# Patient Record
Sex: FEMALE | Race: WHITE | Employment: OTHER | URBAN - METROPOLITAN AREA
[De-identification: names, ages, dates, MRNs, and addresses within clinical notes are randomized per-mention and may not be internally consistent; named-entity substitution may affect disease eponyms.]

---

## 2019-02-21 ENCOUNTER — HOSPITAL ENCOUNTER (EMERGENCY)
Age: 84
Discharge: HOME | End: 2019-02-21
Attending: EMERGENCY MEDICINE
Payer: MEDICARE

## 2019-02-21 VITALS
RESPIRATION RATE: 18 BRPM | HEART RATE: 98 BPM | HEIGHT: 60 IN | TEMPERATURE: 98.3 F | DIASTOLIC BLOOD PRESSURE: 74 MMHG | SYSTOLIC BLOOD PRESSURE: 121 MMHG | OXYGEN SATURATION: 98 % | BODY MASS INDEX: 23.98 KG/M2 | WEIGHT: 122.13 LBS

## 2019-02-21 DIAGNOSIS — E87.6 HYPOKALEMIA: Primary | ICD-10-CM

## 2019-02-21 LAB
ALBUMIN SERPL-MCNC: 3.6 G/DL (ref 3.2–4.6)
ALBUMIN/GLOB SERPL: 0.9 {RATIO}
ALP SERPL-CCNC: 104 U/L (ref 50–136)
ALT SERPL-CCNC: 20 U/L (ref 12–65)
ANION GAP SERPL CALC-SCNC: 8 MMOL/L
AST SERPL-CCNC: 45 U/L (ref 15–37)
BASOPHILS # BLD: 0 K/UL (ref 0–0.2)
BASOPHILS NFR BLD: 1 % (ref 0–2)
BILIRUB SERPL-MCNC: 0.4 MG/DL (ref 0.2–1.1)
BUN SERPL-MCNC: 25 MG/DL (ref 8–23)
CALCIUM SERPL-MCNC: 9.6 MG/DL (ref 8.3–10.4)
CHLORIDE SERPL-SCNC: 105 MMOL/L (ref 98–107)
CO2 SERPL-SCNC: 24 MMOL/L (ref 21–32)
CREAT SERPL-MCNC: 1.9 MG/DL (ref 0.6–1)
DIFFERENTIAL METHOD BLD: ABNORMAL
EOSINOPHIL # BLD: 0.1 K/UL (ref 0–0.8)
EOSINOPHIL NFR BLD: 1 % (ref 0.5–7.8)
ERYTHROCYTE [DISTWIDTH] IN BLOOD BY AUTOMATED COUNT: 12.7 % (ref 11.9–14.6)
GLOBULIN SER CALC-MCNC: 4 G/DL (ref 2.3–3.5)
GLUCOSE SERPL-MCNC: 96 MG/DL (ref 65–100)
HCT VFR BLD AUTO: 38.4 % (ref 35.8–46.3)
HGB BLD-MCNC: 12.4 G/DL (ref 11.7–15.4)
IMM GRANULOCYTES # BLD AUTO: 0 K/UL (ref 0–0.5)
IMM GRANULOCYTES NFR BLD AUTO: 1 % (ref 0–5)
LYMPHOCYTES # BLD: 1.1 K/UL (ref 0.5–4.6)
LYMPHOCYTES NFR BLD: 15 % (ref 13–44)
MCH RBC QN AUTO: 31.2 PG (ref 26.1–32.9)
MCHC RBC AUTO-ENTMCNC: 32.3 G/DL (ref 31.4–35)
MCV RBC AUTO: 96.5 FL (ref 79.6–97.8)
MONOCYTES # BLD: 0.5 K/UL (ref 0.1–1.3)
MONOCYTES NFR BLD: 7 % (ref 4–12)
NEUTS SEG # BLD: 5.7 K/UL (ref 1.7–8.2)
NEUTS SEG NFR BLD: 76 % (ref 43–78)
NRBC # BLD: 0 K/UL (ref 0–0.2)
PLATELET # BLD AUTO: 194 K/UL (ref 150–450)
PMV BLD AUTO: 10.5 FL (ref 9.4–12.3)
POTASSIUM SERPL-SCNC: 2.8 MMOL/L (ref 3.5–5.1)
PROT SERPL-MCNC: 7.6 G/DL
RBC # BLD AUTO: 3.98 M/UL (ref 4.05–5.2)
SODIUM SERPL-SCNC: 137 MMOL/L (ref 136–145)
WBC # BLD AUTO: 7.5 K/UL (ref 4.3–11.1)

## 2019-02-21 PROCEDURE — 74011250637 HC RX REV CODE- 250/637: Performed by: EMERGENCY MEDICINE

## 2019-02-21 PROCEDURE — 99282 EMERGENCY DEPT VISIT SF MDM: CPT | Performed by: EMERGENCY MEDICINE

## 2019-02-21 PROCEDURE — 80053 COMPREHEN METABOLIC PANEL: CPT

## 2019-02-21 PROCEDURE — 85025 COMPLETE CBC W/AUTO DIFF WBC: CPT

## 2019-02-21 RX ORDER — MIRTAZAPINE 15 MG/1
15 TABLET, FILM COATED ORAL
COMMUNITY
Start: 2019-02-21 | End: 2019-03-23

## 2019-02-21 RX ORDER — AMOXICILLIN 250 MG
CAPSULE ORAL
Status: ON HOLD | COMMUNITY
End: 2019-09-17

## 2019-02-21 RX ORDER — BENAZEPRIL HYDROCHLORIDE 10 MG/1
10 TABLET ORAL
COMMUNITY
Start: 2019-02-21 | End: 2019-03-23

## 2019-02-21 RX ORDER — VENLAFAXINE HYDROCHLORIDE 150 MG/1
150 CAPSULE, EXTENDED RELEASE ORAL
COMMUNITY
Start: 2019-02-21 | End: 2019-03-23

## 2019-02-21 RX ORDER — ERGOCALCIFEROL 1.25 MG/1
50000 CAPSULE ORAL
Status: ON HOLD | COMMUNITY
Start: 2019-02-21 | End: 2019-09-17

## 2019-02-21 RX ORDER — FOLIC ACID 1 MG/1
1 TABLET ORAL
COMMUNITY
End: 2019-09-23

## 2019-02-21 RX ORDER — POTASSIUM CHLORIDE 20 MEQ/1
40 TABLET, EXTENDED RELEASE ORAL
Status: COMPLETED | OUTPATIENT
Start: 2019-02-21 | End: 2019-02-21

## 2019-02-21 RX ORDER — POTASSIUM CHLORIDE 20 MEQ/1
20 TABLET, EXTENDED RELEASE ORAL 2 TIMES DAILY
Qty: 10 TAB | Refills: 0 | Status: ON HOLD | OUTPATIENT
Start: 2019-02-21 | End: 2019-09-17

## 2019-02-21 RX ORDER — AMLODIPINE BESYLATE 5 MG/1
5 TABLET ORAL
COMMUNITY
Start: 2019-02-21 | End: 2019-03-23

## 2019-02-21 RX ORDER — ZOLPIDEM TARTRATE 5 MG/1
5 TABLET ORAL
COMMUNITY
Start: 2019-02-21 | End: 2019-03-23

## 2019-02-21 RX ORDER — POTASSIUM CHLORIDE 750 MG/1
10 CAPSULE, EXTENDED RELEASE ORAL
COMMUNITY
Start: 2019-02-21 | End: 2019-02-21 | Stop reason: DRUGHIGH

## 2019-02-21 RX ORDER — CELECOXIB 100 MG/1
100 CAPSULE ORAL
COMMUNITY
Start: 2019-02-21 | End: 2019-03-23

## 2019-02-21 RX ADMIN — POTASSIUM CHLORIDE 40 MEQ: 20 TABLET, EXTENDED RELEASE ORAL at 22:38

## 2019-02-22 NOTE — ED PROVIDER NOTES
[de-identified]year-old brought to the emergency room due to abnormal lab results. Family reports that the patient was called today and told that her potassium was 2.8. She was urged to come to the ER immediately for evaluation Patient is not on any loop diuretics. She's had no vomiting or diarrhea Patient has been essentially asymptomatic and feeling at her baseline Patient does have a history of hypokalemia, however, she has not been on a potassium supplementation in some time The history is provided by the patient and a relative. Abnormal Lab Results This is a new problem. The current episode started 6 to 12 hours ago. The problem occurs constantly. The problem has not changed since onset. Pertinent negatives include no chest pain, no abdominal pain, no headaches and no shortness of breath. Nothing aggravates the symptoms. Nothing relieves the symptoms. She has tried nothing for the symptoms. Past Medical History:  
Diagnosis Date  ADHD  Arthritis  Hypertension History reviewed. No pertinent surgical history. History reviewed. No pertinent family history. Social History Socioeconomic History  Marital status:  Spouse name: Not on file  Number of children: Not on file  Years of education: Not on file  Highest education level: Not on file Social Needs  Financial resource strain: Not on file  Food insecurity - worry: Not on file  Food insecurity - inability: Not on file  Transportation needs - medical: Not on file  Transportation needs - non-medical: Not on file Occupational History  Not on file Tobacco Use  Smoking status: Never Smoker  Smokeless tobacco: Never Used Substance and Sexual Activity  Alcohol use: No  
  Frequency: Never  Drug use: No  
 Sexual activity: Not Currently Other Topics Concern  Not on file Social History Narrative  Not on file ALLERGIES: Patient has no known allergies. Review of Systems Unable to perform ROS: Dementia Constitutional: Negative for chills and fever. HENT: Negative for congestion, ear pain and rhinorrhea. Eyes: Negative for photophobia and discharge. Respiratory: Negative for cough and shortness of breath. Cardiovascular: Negative for chest pain and palpitations. Gastrointestinal: Negative for abdominal pain, constipation, diarrhea and vomiting. Endocrine: Negative for cold intolerance and heat intolerance. Genitourinary: Negative for dysuria and flank pain. Musculoskeletal: Negative for arthralgias, myalgias and neck pain. Skin: Negative for rash and wound. Allergic/Immunologic: Negative for environmental allergies and food allergies. Neurological: Negative for syncope and headaches. Hematological: Negative for adenopathy. Does not bruise/bleed easily. Psychiatric/Behavioral: Negative for dysphoric mood. The patient is not nervous/anxious. All other systems reviewed and are negative. Vitals:  
 02/21/19 2029 BP: 120/72 Pulse: (!) 112 Resp: 16 Temp: 98.3 °F (36.8 °C) SpO2: 99% Weight: 55.4 kg (122 lb 2 oz) Height: 5' (1.524 m) Physical Exam  
Constitutional: She is oriented to person, place, and time. She appears well-developed and well-nourished. She appears distressed. HENT:  
Head: Normocephalic and atraumatic. Mouth/Throat: No oropharyngeal exudate. Eyes: EOM are normal. Pupils are equal, round, and reactive to light. Neck: Normal range of motion. Neck supple. No JVD present. Cardiovascular: Normal rate, regular rhythm, normal heart sounds and intact distal pulses. Exam reveals no gallop and no friction rub. No murmur heard. Pulmonary/Chest: Effort normal and breath sounds normal.  
Abdominal: Soft. Normal appearance and bowel sounds are normal. She exhibits no distension and no mass. There is no hepatosplenomegaly. There is no tenderness. Musculoskeletal: Normal range of motion. She exhibits no edema or deformity. Neurological: She is alert and oriented to person, place, and time. She has normal strength. No cranial nerve deficit or sensory deficit. She displays a negative Romberg sign. Gait normal. GCS eye subscore is 4. GCS verbal subscore is 5. GCS motor subscore is 6. Skin: Skin is warm and dry. Capillary refill takes less than 2 seconds. No rash noted. Psychiatric: She has a normal mood and affect. Her speech is normal and behavior is normal. Judgment and thought content normal. She exhibits abnormal recent memory. Nursing note and vitals reviewed. MDM Number of Diagnoses or Management Options Hypokalemia: new and requires workup Diagnosis management comments: labwork tonight confirms a potassium of 2.8 Discussed treatment options with the patient and family They are agreeable to oral supplementation. Family strongly encouraged to have patient followed up in 4-5 days for repeat lab work to confirm potassium status Amount and/or Complexity of Data Reviewed Clinical lab tests: ordered and reviewed Tests in the medicine section of CPT®: ordered and reviewed Review and summarize past medical records: yes Risk of Complications, Morbidity, and/or Mortality Presenting problems: moderate Diagnostic procedures: moderate Management options: moderate General comments: Elements of this note have been dictated via voice recognition software. Text and phrases may be limited by the accuracy of the software. The chart has been reviewed, but errors may still be present. Patient Progress Patient progress: stable Procedures

## 2019-02-22 NOTE — ED NOTES
I have reviewed discharge instructions with the patient. The patient verbalized understanding. Patient left ED via Discharge Method: ambulatory to Home with family. Opportunity for questions and clarification provided. Patient given 1 scripts. To continue your aftercare when you leave the hospital, you may receive an automated call from our care team to check in on how you are doing. This is a free service and part of our promise to provide the best care and service to meet your aftercare needs.  If you have questions, or wish to unsubscribe from this service please call [phone]. Thank you for Choosing our [facility] Department.

## 2019-02-22 NOTE — ED TRIAGE NOTES
Pt states that she had routine labs drawn today and was called by the NP. Stated that her Potassium was low

## 2019-02-22 NOTE — DISCHARGE INSTRUCTIONS
Take medication as directed  Recheck with your doctor in 2-3 days  Return to ER for any worsening symptoms or new problems which may arise

## 2019-02-26 ENCOUNTER — HOSPITAL ENCOUNTER (EMERGENCY)
Age: 84
Discharge: HOME | End: 2019-02-26
Attending: EMERGENCY MEDICINE
Payer: MEDICARE

## 2019-02-26 ENCOUNTER — APPOINTMENT (OUTPATIENT)
Dept: CT IMAGING | Age: 84
End: 2019-02-26
Attending: EMERGENCY MEDICINE
Payer: MEDICARE

## 2019-02-26 ENCOUNTER — APPOINTMENT (OUTPATIENT)
Dept: GENERAL RADIOLOGY | Age: 84
End: 2019-02-26
Attending: EMERGENCY MEDICINE
Payer: MEDICARE

## 2019-02-26 VITALS
HEART RATE: 84 BPM | RESPIRATION RATE: 17 BRPM | DIASTOLIC BLOOD PRESSURE: 78 MMHG | OXYGEN SATURATION: 98 % | TEMPERATURE: 98.3 F | SYSTOLIC BLOOD PRESSURE: 178 MMHG | WEIGHT: 122 LBS | BODY MASS INDEX: 23.95 KG/M2 | HEIGHT: 60 IN

## 2019-02-26 DIAGNOSIS — M25.512 PAIN IN JOINT OF LEFT SHOULDER: ICD-10-CM

## 2019-02-26 DIAGNOSIS — S41.111A SKIN TEAR OF RIGHT UPPER ARM WITHOUT COMPLICATION, INITIAL ENCOUNTER: ICD-10-CM

## 2019-02-26 DIAGNOSIS — S01.01XA LACERATION OF SCALP, INITIAL ENCOUNTER: Primary | ICD-10-CM

## 2019-02-26 LAB
BACTERIA URNS QL MICRO: 0 /HPF
CASTS URNS QL MICRO: NORMAL /LPF
EPI CELLS #/AREA URNS HPF: NORMAL /HPF
RBC #/AREA URNS HPF: NORMAL /HPF
WBC URNS QL MICRO: NORMAL /HPF

## 2019-02-26 PROCEDURE — 77030008462 HC STPLR SKN PROX J&J -A

## 2019-02-26 PROCEDURE — 81015 MICROSCOPIC EXAM OF URINE: CPT

## 2019-02-26 PROCEDURE — 73030 X-RAY EXAM OF SHOULDER: CPT

## 2019-02-26 PROCEDURE — 81003 URINALYSIS AUTO W/O SCOPE: CPT | Performed by: EMERGENCY MEDICINE

## 2019-02-26 PROCEDURE — 99284 EMERGENCY DEPT VISIT MOD MDM: CPT | Performed by: EMERGENCY MEDICINE

## 2019-02-26 PROCEDURE — 70450 CT HEAD/BRAIN W/O DYE: CPT

## 2019-02-26 PROCEDURE — 75810000293 HC SIMP/SUPERF WND  RPR: Performed by: EMERGENCY MEDICINE

## 2019-02-26 NOTE — ED NOTES
I have reviewed discharge instructions with the patient and caregiver. The patient and caregiver verbalized understanding. Patient left ED via Discharge Method: wheelchair to Home with family. Opportunity for questions and clarification provided. Patient given 0 scripts. To continue your aftercare when you leave the hospital, you may receive an automated call from our care team to check in on how you are doing. This is a free service and part of our promise to provide the best care and service to meet your aftercare needs.  If you have questions, or wish to unsubscribe from this service please call [phone]. Thank you for Choosing our [facility] Emergency Department.

## 2019-02-26 NOTE — ED PROVIDER NOTES
HPI: 
80 female here status post mechanical fall. Stated she got out of bed to go to the bathroom this morning. Somehow lost her balance. Head and the right side of her head on the bedpost.  No loss of consciousness. Bleeding and no right-sided scalp. Also has skin tear on her right forearm pain in her left shoulder and arm pit. Recently treated for UTI 3 weeks ago. Denies any pain with urination right now however still has some incontinent and that has progressively worsened over the past few years Denies any hip pain, weakness tingling or numbness in one side of the body Denies chest pain or shortness of breath before or after the fall ROS Constitutional: No fever, no chills Skin: no rash Eye: No vision changes ENMT: No sore throat Respiratory: No shortness of breath, no cough Cardiovascular: No chest pain, no palpitations Gastrointestinal: No vomiting, no nausea, no diarrhea, no abdominal pain : No dysuria MSK: No back pain, no muscle pain, + joint pain Neuro: No headache, no change in mental status, no numbness, no tingling, no weakness Psych:  
Endocrine:  
All other review of systems positive per history of present illness and the above otherwise negative or noncontributory. Visit Vitals /84 Pulse 86 Temp 98.3 °F (36.8 °C) Resp 16 Ht 5' (1.524 m) Wt 55.3 kg (122 lb) SpO2 96% BMI 23.83 kg/m² Past Medical History:  
Diagnosis Date  ADHD  Arthritis  Hypertension History reviewed. No pertinent surgical history. Prior to Admission Medications Prescriptions Last Dose Informant Patient Reported? Taking? amLODIPine (NORVASC) 5 mg tablet   Yes No  
Sig: Take 5 mg by mouth.  
benazepril (LOTENSIN) 10 mg tablet   Yes No  
Sig: Take 10 mg by mouth. celecoxib (CELEBREX) 100 mg capsule   Yes No  
Sig: Take 100 mg by mouth.  
ergocalciferol (ERGOCALCIFEROL) 50,000 unit capsule   Yes No  
Sig: Take 50,000 Units by mouth. folic acid (FOLVITE) 1 mg tablet   Yes No  
Sig: Take 1 mg by mouth.  
mirtazapine (REMERON) 15 mg tablet   Yes No  
Sig: Take 15 mg by mouth.  
potassium chloride (K-DUR, KLOR-CON) 20 mEq tablet   No No  
Sig: Take 1 Tab by mouth two (2) times a day. senna-docusate (PERICOLACE) 8.6-50 mg per tablet   Yes No  
Sig: Take  by mouth. venlafaxine-SR (EFFEXOR-XR) 150 mg capsule   Yes No  
Sig: Take 150 mg by mouth.  
zolpidem (AMBIEN) 5 mg tablet   Yes No  
Sig: Take 5 mg by mouth. Facility-Administered Medications: None Adult Exam  
General: alert, no acute distress Head: large hematoma in the right parietal. 1 cm laceration without FB. No boggy scalp. ENT: moist mucous membranes Neck: supple, non-tender; full range of motion Cardiovascular: regular rate and rhythm, normal peripheral perfusion, no edema Respiratory:  normal respirations; no wheezing, rales or rhonchi Gastrointestinal: soft, non-tender; no rebound or guarding, no peritoneal signs, no distension Back: non-tender, full range of motion Musculoskeletal: No pain bilateral clavicle, chest wall. Left shoulder without any obvious deformity, some mild tenderness on the anterior and inferior aspect radiating into the left ribs. No paradoxical chest wall movement. Range of motion is fairly intact. No pain at the right upper extremity, left elbow, left hand and wrist.  Pelvic is stable with full range of motion of bilateral lower extremity 
 normal strength, no gross deformities RT forearm - 1 cm skin tear that will not required suture repair. No FB noted. Neurological: alert and oriented x 4, no gross focal deficits; normal speech Psychiatric: cooperative; appropriate mood and affect MDM: no LOC or status post fall with large hematoma in the right scalp. We'll obtain CT scan of the head, x-ray of the left shoulder for further assessment. We'll clean wound. We'll check urine. UA negative for any acute infection. No ICH on CT. 2 staples placed. Need removal in 7 days. F/u with pcp or return. Precautions given. Xr Shoulder Lt Ap/lat Min 2 V Result Date: 2/26/2019 Left shoulder 3 view dated 2/26/2019 CLINICAL INFORMATION: Moderate pain after fall this morning Views of the shoulder show no acute fracture or dislocation. There is degenerative change at the glenohumeral joint and acromioclavicular joint. IMPRESSION: No acute fracture Ct Head Wo Cont Result Date: 2/26/2019 CT Brain dated 2/26/2019  Comparison: None Clinical Information:  Hit head on bedpost  5 mm axial images were obtained from skull base to vertex without contrast. Radiation dose reduction techniques were used for this study. Our scanners use one or all of the following:  Automated exposure control, adjustment of the mA and/or kV according to patient size, iterative reconstruction. Findings: Ventricles, sulci and cisterns are within normal limits in size. No midline shift. Ill-defined hypodensity in the cerebral white matter bilaterally is consistent chronic ischemic white matter change. No hemorrhage, mass, mass effect or acute territorial infarction. No extra-axial abnormality. No skull fracture. Some is present right parietal region. Mastoid air cells and visualized paranasal sinuses are aerated and unremarkable. Impression: No acute intracranial abnormality Procedure Note - Laceration repair Performed by: Hank Kolb MD 
Immediately prior to the procedure, the patient was reevaluated and found suitable for the planned procedure and any planned medications. Immediately prior to the procedure a timeout was called to verify the correct patient, procedure, equipment, and markings as appropriate. Complexity: right scalp A 1 cm laceration to the right scalp was irrigated copiously, prepped with chloroprep and draped as appropriate.  The area was anesthetized with 2 mLs of Lidocaine 1%. The wound was explored and no foreign bodies were found. The wound was repaired with 2 staples. The wound was closed with good hemostasis and approximation. A dressing was applied. The procedure took 10 minutes. The patient tolerated the procedure well. Dragon voice recognition software was used to create this note. Although the note has been reviewed and corrected where necessary, additional errors may have been overlooked and remain in the text.

## 2019-02-26 NOTE — ED NOTES
Skin tear to right forearm cleaned. Neosporin and non stick gauze applied covered with rolled gauze. Pt tolerated well. Laceration to scalp cleaned using wound  and 4x4 gauze.

## 2019-02-26 NOTE — ED TRIAGE NOTES
Pt brought to ED via EMS from home. Per EMS pt fell and hit head on bed post. Pt wit laceration to forehead and right axilla pain. Pt with skin tear to right wrist/forearm. Pt also reports right knee pain since yesterday. Pt denies LOC.  
 
Reena Raphael RN

## 2019-05-10 ENCOUNTER — HOSPITAL ENCOUNTER (OUTPATIENT)
Dept: LAB | Age: 84
Discharge: HOME | End: 2019-05-10

## 2019-05-10 LAB
ANION GAP SERPL CALC-SCNC: 9 MMOL/L (ref 7–16)
APPEARANCE UR: ABNORMAL
BACTERIA URNS QL MICRO: ABNORMAL /HPF
BASOPHILS # BLD: 0.1 K/UL (ref 0–0.2)
BASOPHILS NFR BLD: 1 % (ref 0–2)
BILIRUB UR QL: NEGATIVE
BUN SERPL-MCNC: 31 MG/DL (ref 8–23)
CALCIUM SERPL-MCNC: 9.4 MG/DL (ref 8.3–10.4)
CASTS URNS QL MICRO: 0 /LPF
CHLORIDE SERPL-SCNC: 103 MMOL/L (ref 98–107)
CO2 SERPL-SCNC: 28 MMOL/L (ref 21–32)
COLOR UR: YELLOW
CREAT SERPL-MCNC: 1.36 MG/DL (ref 0.6–1)
CRYSTALS URNS QL MICRO: 0 /LPF
DIFFERENTIAL METHOD BLD: ABNORMAL
EOSINOPHIL # BLD: 0.2 K/UL (ref 0–0.8)
EOSINOPHIL NFR BLD: 3 % (ref 0.5–7.8)
EPI CELLS #/AREA URNS HPF: ABNORMAL /HPF
ERYTHROCYTE [DISTWIDTH] IN BLOOD BY AUTOMATED COUNT: 12.5 % (ref 11.9–14.6)
GLUCOSE SERPL-MCNC: 73 MG/DL (ref 65–100)
GLUCOSE UR STRIP.AUTO-MCNC: NEGATIVE MG/DL
HCT VFR BLD AUTO: 36.2 % (ref 35.8–46.3)
HGB BLD-MCNC: 10.9 G/DL (ref 11.7–15.4)
HGB UR QL STRIP: ABNORMAL
IMM GRANULOCYTES # BLD AUTO: 0.1 K/UL (ref 0–0.5)
IMM GRANULOCYTES NFR BLD AUTO: 1 % (ref 0–5)
KETONES UR QL STRIP.AUTO: NEGATIVE MG/DL
LEUKOCYTE ESTERASE UR QL STRIP.AUTO: ABNORMAL
LYMPHOCYTES # BLD: 2 K/UL (ref 0.5–4.6)
LYMPHOCYTES NFR BLD: 23 % (ref 13–44)
MCH RBC QN AUTO: 32.1 PG (ref 26.1–32.9)
MCHC RBC AUTO-ENTMCNC: 30.1 G/DL (ref 31.4–35)
MCV RBC AUTO: 106.5 FL (ref 79.6–97.8)
MONOCYTES # BLD: 0.5 K/UL (ref 0.1–1.3)
MONOCYTES NFR BLD: 6 % (ref 4–12)
MUCOUS THREADS URNS QL MICRO: 0 /LPF
NEUTS SEG # BLD: 5.7 K/UL (ref 1.7–8.2)
NEUTS SEG NFR BLD: 67 % (ref 43–78)
NITRITE UR QL STRIP.AUTO: NEGATIVE
NRBC # BLD: 0 K/UL (ref 0–0.2)
PH UR STRIP: 7 [PH] (ref 5–9)
PLATELET # BLD AUTO: 187 K/UL (ref 150–450)
PMV BLD AUTO: 11.4 FL (ref 9.4–12.3)
POTASSIUM SERPL-SCNC: 3.8 MMOL/L (ref 3.5–5.1)
PROT UR STRIP-MCNC: NEGATIVE MG/DL
RBC # BLD AUTO: 3.4 M/UL (ref 4.05–5.2)
RBC #/AREA URNS HPF: ABNORMAL /HPF
SODIUM SERPL-SCNC: 140 MMOL/L (ref 136–145)
SP GR UR REFRACTOMETRY: 1.01 (ref 1–1.02)
UROBILINOGEN UR QL STRIP.AUTO: 0.2 EU/DL (ref 0.2–1)
WBC # BLD AUTO: 8.6 K/UL (ref 4.3–11.1)
WBC URNS QL MICRO: >100 /HPF

## 2019-05-10 PROCEDURE — 81001 URINALYSIS AUTO W/SCOPE: CPT

## 2019-05-10 PROCEDURE — 85025 COMPLETE CBC W/AUTO DIFF WBC: CPT

## 2019-05-10 PROCEDURE — 87088 URINE BACTERIA CULTURE: CPT

## 2019-05-10 PROCEDURE — 87186 SC STD MICRODIL/AGAR DIL: CPT

## 2019-05-10 PROCEDURE — 87086 URINE CULTURE/COLONY COUNT: CPT

## 2019-05-10 PROCEDURE — 81015 MICROSCOPIC EXAM OF URINE: CPT

## 2019-05-10 PROCEDURE — 80048 BASIC METABOLIC PNL TOTAL CA: CPT

## 2019-05-13 LAB
BACTERIA SPEC CULT: ABNORMAL
SERVICE CMNT-IMP: ABNORMAL

## 2019-06-28 ENCOUNTER — HOSPITAL ENCOUNTER (EMERGENCY)
Age: 84
Discharge: HOME | End: 2019-06-28
Attending: EMERGENCY MEDICINE
Payer: MEDICARE

## 2019-06-28 ENCOUNTER — APPOINTMENT (OUTPATIENT)
Dept: CT IMAGING | Age: 84
End: 2019-06-28
Attending: NURSE PRACTITIONER
Payer: MEDICARE

## 2019-06-28 VITALS
TEMPERATURE: 98.1 F | SYSTOLIC BLOOD PRESSURE: 176 MMHG | HEART RATE: 57 BPM | HEIGHT: 60 IN | RESPIRATION RATE: 16 BRPM | DIASTOLIC BLOOD PRESSURE: 77 MMHG | BODY MASS INDEX: 23.95 KG/M2 | OXYGEN SATURATION: 100 % | WEIGHT: 122 LBS
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
## 2019-06-28 DIAGNOSIS — S09.90XA CLOSED HEAD INJURY, INITIAL ENCOUNTER: ICD-10-CM

## 2019-06-28 DIAGNOSIS — S01.81XA FACIAL LACERATION, INITIAL ENCOUNTER: ICD-10-CM

## 2019-06-28 LAB
ANION GAP SERPL CALC-SCNC: 6 MMOL/L (ref 7–16)
APPEARANCE UR: CLEAR
ATRIAL RATE: 50 BPM
BACTERIA URNS QL MICRO: 0 /HPF
BASOPHILS # BLD: 0 K/UL (ref 0–0.2)
BASOPHILS NFR BLD: 0 % (ref 0–2)
BILIRUB UR QL: NEGATIVE
BUN SERPL-MCNC: 19 MG/DL (ref 8–23)
CALCIUM SERPL-MCNC: 9.5 MG/DL (ref 8.3–10.4)
CALCULATED P AXIS, ECG09: 61 DEGREES
CALCULATED R AXIS, ECG10: 4 DEGREES
CALCULATED T AXIS, ECG11: 72 DEGREES
CASTS URNS QL MICRO: 0 /LPF
CHLORIDE SERPL-SCNC: 99 MMOL/L (ref 98–107)
CO2 SERPL-SCNC: 29 MMOL/L (ref 21–32)
COLOR UR: YELLOW
CREAT SERPL-MCNC: 0.93 MG/DL (ref 0.6–1)
DIAGNOSIS, 93000: NORMAL
DIFFERENTIAL METHOD BLD: ABNORMAL
EOSINOPHIL # BLD: 0.1 K/UL (ref 0–0.8)
EOSINOPHIL NFR BLD: 1 % (ref 0.5–7.8)
EPI CELLS #/AREA URNS HPF: ABNORMAL /HPF
ERYTHROCYTE [DISTWIDTH] IN BLOOD BY AUTOMATED COUNT: 12.5 % (ref 11.9–14.6)
GLUCOSE SERPL-MCNC: 92 MG/DL (ref 65–100)
GLUCOSE UR STRIP.AUTO-MCNC: NEGATIVE MG/DL
HCT VFR BLD AUTO: 34.8 % (ref 35.8–46.3)
HGB BLD-MCNC: 11.2 G/DL (ref 11.7–15.4)
HGB UR QL STRIP: ABNORMAL
IMM GRANULOCYTES # BLD AUTO: 0.1 K/UL (ref 0–0.5)
IMM GRANULOCYTES NFR BLD AUTO: 1 % (ref 0–5)
KETONES UR QL STRIP.AUTO: NEGATIVE MG/DL
LEUKOCYTE ESTERASE UR QL STRIP.AUTO: ABNORMAL
LYMPHOCYTES # BLD: 2 K/UL (ref 0.5–4.6)
LYMPHOCYTES NFR BLD: 25 % (ref 13–44)
MCH RBC QN AUTO: 30.6 PG (ref 26.1–32.9)
MCHC RBC AUTO-ENTMCNC: 32.2 G/DL (ref 31.4–35)
MCV RBC AUTO: 95.1 FL (ref 79.6–97.8)
MONOCYTES # BLD: 0.6 K/UL (ref 0.1–1.3)
MONOCYTES NFR BLD: 8 % (ref 4–12)
NEUTS SEG # BLD: 5.2 K/UL (ref 1.7–8.2)
NEUTS SEG NFR BLD: 66 % (ref 43–78)
NITRITE UR QL STRIP.AUTO: NEGATIVE
NRBC # BLD: 0 K/UL (ref 0–0.2)
P-R INTERVAL, ECG05: 140 MS
PH UR STRIP: 7 [PH] (ref 5–9)
PLATELET # BLD AUTO: 178 K/UL (ref 150–450)
PMV BLD AUTO: 10 FL (ref 9.4–12.3)
POTASSIUM SERPL-SCNC: 4 MMOL/L (ref 3.5–5.1)
PROT UR STRIP-MCNC: 30 MG/DL
Q-T INTERVAL, ECG07: 492 MS
QRS DURATION, ECG06: 82 MS
QTC CALCULATION (BEZET), ECG08: 448 MS
RBC # BLD AUTO: 3.66 M/UL (ref 4.05–5.2)
RBC #/AREA URNS HPF: ABNORMAL /HPF
SODIUM SERPL-SCNC: 134 MMOL/L (ref 136–145)
SP GR UR REFRACTOMETRY: 1.01 (ref 1–1.02)
UROBILINOGEN UR QL STRIP.AUTO: 0.2 EU/DL (ref 0.2–1)
VENTRICULAR RATE, ECG03: 50 BPM
WBC # BLD AUTO: 7.9 K/UL (ref 4.3–11.1)
WBC URNS QL MICRO: ABNORMAL /HPF

## 2019-06-28 PROCEDURE — 96376 TX/PRO/DX INJ SAME DRUG ADON: CPT | Performed by: NURSE PRACTITIONER

## 2019-06-28 PROCEDURE — 80048 BASIC METABOLIC PNL TOTAL CA: CPT

## 2019-06-28 PROCEDURE — 81001 URINALYSIS AUTO W/SCOPE: CPT

## 2019-06-28 PROCEDURE — 72125 CT NECK SPINE W/O DYE: CPT

## 2019-06-28 PROCEDURE — 96374 THER/PROPH/DIAG INJ IV PUSH: CPT | Performed by: NURSE PRACTITIONER

## 2019-06-28 PROCEDURE — 74011250636 HC RX REV CODE- 250/636: Performed by: NURSE PRACTITIONER

## 2019-06-28 PROCEDURE — 70450 CT HEAD/BRAIN W/O DYE: CPT

## 2019-06-28 PROCEDURE — 93005 ELECTROCARDIOGRAM TRACING: CPT | Performed by: NURSE PRACTITIONER

## 2019-06-28 PROCEDURE — 85025 COMPLETE CBC W/AUTO DIFF WBC: CPT

## 2019-06-28 PROCEDURE — 99284 EMERGENCY DEPT VISIT MOD MDM: CPT | Performed by: NURSE PRACTITIONER

## 2019-06-28 RX ORDER — HYDRALAZINE HYDROCHLORIDE 20 MG/ML
10 INJECTION INTRAMUSCULAR; INTRAVENOUS
Status: COMPLETED | OUTPATIENT
Start: 2019-06-28 | End: 2019-06-28

## 2019-06-28 RX ORDER — METOPROLOL SUCCINATE 25 MG/1
25 TABLET, EXTENDED RELEASE ORAL DAILY
Status: ON HOLD | COMMUNITY
End: 2019-09-17

## 2019-06-28 RX ORDER — CARVEDILOL 3.12 MG/1
3.12 TABLET ORAL 2 TIMES DAILY WITH MEALS
COMMUNITY
End: 2019-09-23

## 2019-06-28 RX ORDER — HYDRALAZINE HYDROCHLORIDE 20 MG/ML
20 INJECTION INTRAMUSCULAR; INTRAVENOUS
Status: COMPLETED | OUTPATIENT
Start: 2019-06-28 | End: 2019-06-28

## 2019-06-28 RX ADMIN — HYDRALAZINE HYDROCHLORIDE 20 MG: 20 INJECTION INTRAMUSCULAR; INTRAVENOUS at 18:23

## 2019-06-28 RX ADMIN — HYDRALAZINE HYDROCHLORIDE 10 MG: 20 INJECTION INTRAMUSCULAR; INTRAVENOUS at 17:06

## 2019-06-28 NOTE — DISCHARGE INSTRUCTIONS
Over the counter tylenol for pain. Follow up with your primary care provider to have your blood pressure rechecked. Return to the emergency department as needed.

## 2019-06-28 NOTE — ED PROVIDER NOTES
Patient states she fell last night in her bathroom. She states she hit her head but is unsure of what she hit. She denies LOC. She denies nausea and vomiting. She has a laceration above her left eye. No bleeding at this time. Patient noted to have elevated blood pressure reading in triage. Patient's family states patient was recently started on new blood pressure medication but her bp has continued to be elevated. The history is provided by the patient. Past Medical History:   Diagnosis Date    ADHD     Arthritis     Hypertension        History reviewed. No pertinent surgical history. History reviewed. No pertinent family history.     Social History     Socioeconomic History    Marital status:      Spouse name: Not on file    Number of children: Not on file    Years of education: Not on file    Highest education level: Not on file   Occupational History    Not on file   Social Needs    Financial resource strain: Not on file    Food insecurity:     Worry: Not on file     Inability: Not on file    Transportation needs:     Medical: Not on file     Non-medical: Not on file   Tobacco Use    Smoking status: Never Smoker    Smokeless tobacco: Never Used   Substance and Sexual Activity    Alcohol use: No     Frequency: Never    Drug use: No    Sexual activity: Not Currently   Lifestyle    Physical activity:     Days per week: Not on file     Minutes per session: Not on file    Stress: Not on file   Relationships    Social connections:     Talks on phone: Not on file     Gets together: Not on file     Attends [employer] service: Not on file     Active member of club or organization: Not on file     Attends meetings of clubs or organizations: Not on file     Relationship status: Not on file    Intimate partner violence:     Fear of current or ex partner: Not on file     Emotionally abused: Not on file     Physically abused: Not on file     Forced sexual activity: Not on file   Other Topics Concern    Not on file   Social History Narrative    Not on file         ALLERGIES: Patient has no known allergies. Review of Systems   Constitutional: Negative for chills and fever. Respiratory: Negative for cough. Cardiovascular: Negative for chest pain. Gastrointestinal: Negative for abdominal pain, nausea and vomiting. Skin: Positive for wound. Neurological: Negative for dizziness. Vitals:    06/28/19 1447 06/28/19 1521 06/28/19 1706 06/28/19 1823   BP: (!) 196/91 (!) 204/88 (!) 216/91 (!) 212/91   Pulse:   (!) 58 66   Resp:       Temp:       SpO2:  96%     Weight:       Height:                Physical Exam   Constitutional: She is oriented to person, place, and time. She appears well-developed and well-nourished. No distress. HENT:   Head: Normocephalic. Head is with laceration. Eyes: Conjunctivae and EOM are normal. Right pupil is round and reactive. Left pupil is round and reactive. Pupils are equal.   Neck: Normal range of motion. Neck supple. Spinous process tenderness and muscular tenderness present. Cardiovascular: Normal rate and regular rhythm. Pulmonary/Chest: Effort normal and breath sounds normal.   Neurological: She is alert and oriented to person, place, and time. No cranial nerve deficit or sensory deficit. GCS eye subscore is 4. GCS verbal subscore is 5. GCS motor subscore is 6. Skin: Skin is warm and dry. She is not diaphoretic. Psychiatric: She has a normal mood and affect. Her behavior is normal.   Nursing note and vitals reviewed.      Recent Results (from the past 12 hour(s))   EKG, 12 LEAD, INITIAL    Collection Time: 06/28/19  3:22 PM   Result Value Ref Range    Ventricular Rate 50 BPM    Atrial Rate 50 BPM    P-R Interval 140 ms    QRS Duration 82 ms    Q-T Interval 492 ms    QTC Calculation (Bezet) 448 ms    Calculated P Axis 61 degrees    Calculated R Axis 4 degrees    Calculated T Axis 72 degrees    Diagnosis       Sinus bradycardia with Premature atrial complexes  Abnormal ECG  No previous ECGs available  Confirmed by Chris Rodriguez MD (), NIK WELLINGTON ([id number]) on 6/28/2019 3:56:10 PM     CBC WITH AUTOMATED DIFF    Collection Time: 06/28/19  3:23 PM   Result Value Ref Range    WBC 7.9 4.3 - 11.1 K/uL    RBC 3.66 (L) 4.05 - 5.2 M/uL    HGB 11.2 (L) 11.7 - 15.4 g/dL    HCT 34.8 (L) 35.8 - 46.3 %    MCV 95.1 79.6 - 97.8 FL    MCH 30.6 26.1 - 32.9 PG    MCHC 32.2 31.4 - 35.0 g/dL    RDW 12.5 11.9 - 14.6 %    PLATELET 245 538 - 404 K/uL    MPV 10.0 9.4 - 12.3 FL    ABSOLUTE NRBC 0.00 0.0 - 0.2 K/uL    DF AUTOMATED      NEUTROPHILS 66 43 - 78 %    LYMPHOCYTES 25 13 - 44 %    MONOCYTES 8 4.0 - 12.0 %    EOSINOPHILS 1 0.5 - 7.8 %    BASOPHILS 0 0.0 - 2.0 %    IMMATURE GRANULOCYTES 1 0.0 - 5.0 %    ABS. NEUTROPHILS 5.2 1.7 - 8.2 K/UL    ABS. LYMPHOCYTES 2.0 0.5 - 4.6 K/UL    ABS. MONOCYTES 0.6 0.1 - 1.3 K/UL    ABS. EOSINOPHILS 0.1 0.0 - 0.8 K/UL    ABS. BASOPHILS 0.0 0.0 - 0.2 K/UL    ABS. IMM.  GRANS. 0.1 0.0 - 0.5 K/UL   METABOLIC PANEL, BASIC    Collection Time: 06/28/19  3:23 PM   Result Value Ref Range    Sodium 134 (L) 136 - 145 mmol/L    Potassium 4.0 3.5 - 5.1 mmol/L    Chloride 99 98 - 107 mmol/L    CO2 29 21 - 32 mmol/L    Anion gap 6 (L) 7 - 16 mmol/L    Glucose 92 65 - 100 mg/dL    BUN 19 8 - 23 MG/DL    Creatinine 0.93 0.6 - 1.0 MG/DL    GFR est AA >60 >60 ml/min/1.73m2    GFR est non-AA >60 >60 ml/min/1.73m2    Calcium 9.5 8.3 - 10.4 MG/DL   URINALYSIS W/ RFLX MICROSCOPIC    Collection Time: 06/28/19  4:31 PM   Result Value Ref Range    Color YELLOW      Appearance CLEAR      Specific gravity 1.010 1.001 - 1.023      pH (UA) 7.0 5.0 - 9.0      Protein 30 (A) NEG mg/dL    Glucose NEGATIVE  mg/dL    Ketone NEGATIVE  NEG mg/dL    Bilirubin NEGATIVE  NEG      Blood MODERATE (A) NEG      Urobilinogen 0.2 0.2 - 1.0 EU/dL    Nitrites NEGATIVE  NEG      Leukocyte Esterase TRACE (A) NEG      WBC 3-5 0 /hpf    RBC 3-5 0 /hpf    Epithelial cells 0-3 0 /hpf Bacteria 0 0 /hpf    Casts 0 0 /lpf     Ct Head Wo Cont    Result Date: 6/28/2019  CT HEAD WITHOUT CONTRAST. INDICATION: Head injury sustained in fall from bed. COMPARISON: None. TECHNIQUE:   5 mm axial scans from the skull base to the vertex. Our CT scanners use one or more of the following:  Automated exposure control, adjustment of the mA and or kV according to patient size, iterative reconstruction. FINDINGS:  No acute intraparenchymal hemorrhage or abnormal extra-axial fluid collection. The ventricles are normal size. No midline shift mass effect. There is white matter low attenuation is present, nonspecific, likely chronic small vessel disease. Included portion of the paranasal sinuses and orbits grossly unremarkable. No skull fracture. IMPRESSION:  Negative for acute intracranial abnormality. Chronic changes. Ct Spine Cerv Wo Cont    Result Date: 6/28/2019  CT CERVICAL SPINE WITHOUT CONTRAST. HISTORY: Trauma. TECHNIQUE: 1.25 mm axial scans from the skull base into the upper chest performed, and sagittal and coronal reconstructed images performed. Radiation dose reduction techniques were used for this study. Our CT scanners use one or more of the following:  Automated exposure control, adjustment of the mA and or kV according to patient size, iterative reconstruction. FINDINGS: The skull base is unremarkable. The bony ring of C1, C2, C3, C4, C5, C6, C7, T1 and T2 are intact. Moderate degenerative changes at the atlantodental interval. There is central spinal stenosis at C4-5, C5-6 and C6-7. There is disc space narrowing and marginal osteophytes at these levels. There is also disc space is C3-4. Dense carotid calcifications are present. Included mastoid cells are clear. Lung apices unremarkable without pneumothorax. Alignment demonstrates mild anterolisthesis of C3 on C4. Precervical soft tissues are unremarkable. No gross fractures. The thyroid appears normal size with small nodules. IMPRESSION: Significant degenerative changes with hypertrophic degenerative change at the atlantodental interval. There is disc space narrowing at C3-4, C4-5, C5-6 and C6-7. There are marginal osteophytes. Mild anterolisthesis of C3 on C4. There is central spinal stenosis at C4-5, C5-6 and C6-7. Dense carotid atherosclerotic change. Negative for acute cervical spine fracture. MDM  Number of Diagnoses or Management Options  Closed head injury, initial encounter:   Facial laceration, initial encounter:   Fall, initial encounter:   Diagnosis management comments: CT head and neck negative for acute abnormalities. No acute findings noted on lab results. Blood pressure improved with hydralazine. Discussed patient with Dr. Yanique Torres.        Amount and/or Complexity of Data Reviewed  Clinical lab tests: reviewed and ordered  Tests in the radiology section of CPT®: ordered and reviewed  Tests in the medicine section of CPT®: ordered and reviewed  Discuss the patient with other providers: yes (Dial  )    Risk of Complications, Morbidity, and/or Mortality  Presenting problems: moderate  Diagnostic procedures: moderate  Management options: low    Patient Progress  Patient progress: stable    ED Course as of Jun 28 1846 Fri Jun 28, 2019   1802 Discussed patient with Dr. Yanique Torres. Additional dose of hydralazine ordered. [JM]      ED Course User Index  [JM] BRADY Aelman       Wound Repair  Date/Time: 6/28/2019 6:49 PM  Performed by: [address] [place] [address],  [id number] provider: Dial  Preparation: skin prepped with Betadine  Pre-procedure re-eval: Immediately prior to the procedure, the patient was reevaluated and found suitable for the planned procedure and any planned medications. Time out: Immediately prior to the procedure a time out was called to verify the correct patient, procedure, equipment, staff and marking as appropriate. .  Location details: face  Wound length:2.5 cm or less  Foreign bodies: no foreign bodies  Irrigation solution: saline  Irrigation method: syringe  Debridement: none  Skin closure: Steri-Strips  Technique: simple  Approximation: close  Patient tolerance: Patient tolerated the procedure well with no immediate complications  My total time at bedside, performing this procedure was 1-15 minutes.

## 2019-06-28 NOTE — ED NOTES
I have reviewed discharge instructions with the patient. The patient verbalized understanding. Patient left ED via Discharge Method: wheelchair to Home with family. Opportunity for questions and clarification provided. Patient given 0 scripts. To continue your aftercare when you leave the hospital, you may receive an automated call from our care team to check in on how you are doing. This is a free service and part of our promise to provide the best care and service to meet your aftercare needs.  If you have questions, or wish to unsubscribe from this service please call [phone]. Thank you for Choosing our [facility] Emergency Department.

## 2019-09-16 ENCOUNTER — HOSPITAL ENCOUNTER (INPATIENT)
Age: 84
LOS: 7 days | Discharge: NURSING, REHAB OR LONG-TERM CARE | DRG: 392 | End: 2019-09-23
Attending: EMERGENCY MEDICINE | Admitting: FAMILY MEDICINE
Payer: MEDICARE

## 2019-09-16 ENCOUNTER — APPOINTMENT (OUTPATIENT)
Dept: CT IMAGING | Age: 84
DRG: 392 | End: 2019-09-16
Attending: EMERGENCY MEDICINE
Payer: MEDICARE

## 2019-09-16 DIAGNOSIS — K52.9 COLITIS: Primary | ICD-10-CM

## 2019-09-16 DIAGNOSIS — G89.4 CHRONIC PAIN SYNDROME: ICD-10-CM

## 2019-09-16 PROBLEM — N39.0 UTI (URINARY TRACT INFECTION): Status: ACTIVE | Noted: 2019-09-16

## 2019-09-16 PROBLEM — I10 HTN (HYPERTENSION): Status: ACTIVE | Noted: 2019-09-16

## 2019-09-16 PROBLEM — N17.9 AKI (ACUTE KIDNEY INJURY) (HCC): Status: ACTIVE | Noted: 2019-09-16

## 2019-09-16 LAB
ALBUMIN SERPL-MCNC: 3.2 G/DL (ref 3.2–4.6)
ALBUMIN/GLOB SERPL: 0.8 {RATIO} (ref 1.2–3.5)
ALP SERPL-CCNC: 157 U/L (ref 50–136)
ALT SERPL-CCNC: 18 U/L (ref 12–65)
AMORPH CRY URNS QL MICRO: ABNORMAL
ANION GAP SERPL CALC-SCNC: 3 MMOL/L (ref 7–16)
APPEARANCE UR: ABNORMAL
AST SERPL-CCNC: 35 U/L (ref 15–37)
BACTERIA URNS QL MICRO: ABNORMAL /HPF
BASOPHILS # BLD: 0 K/UL (ref 0–0.2)
BASOPHILS NFR BLD: 1 % (ref 0–2)
BILIRUB SERPL-MCNC: 0.9 MG/DL (ref 0.2–1.1)
BILIRUB UR QL: ABNORMAL
BUN SERPL-MCNC: 40 MG/DL (ref 8–23)
CALCIUM SERPL-MCNC: 11.1 MG/DL (ref 8.3–10.4)
CASTS URNS QL MICRO: ABNORMAL /LPF
CHLORIDE SERPL-SCNC: 105 MMOL/L (ref 98–107)
CO2 SERPL-SCNC: 23 MMOL/L (ref 21–32)
COLOR UR: ABNORMAL
CREAT SERPL-MCNC: 1.64 MG/DL (ref 0.6–1)
DIFFERENTIAL METHOD BLD: ABNORMAL
EOSINOPHIL # BLD: 0.1 K/UL (ref 0–0.8)
EOSINOPHIL NFR BLD: 1 % (ref 0.5–7.8)
EPI CELLS #/AREA URNS HPF: ABNORMAL /HPF
ERYTHROCYTE [DISTWIDTH] IN BLOOD BY AUTOMATED COUNT: 13.2 % (ref 11.9–14.6)
GLOBULIN SER CALC-MCNC: 3.9 G/DL (ref 2.3–3.5)
GLUCOSE SERPL-MCNC: 179 MG/DL (ref 65–100)
GLUCOSE UR STRIP.AUTO-MCNC: NEGATIVE MG/DL
HCT VFR BLD AUTO: 43.7 % (ref 35.8–46.3)
HGB BLD-MCNC: 14.2 G/DL (ref 11.7–15.4)
HGB UR QL STRIP: NEGATIVE
IMM GRANULOCYTES # BLD AUTO: 0 K/UL (ref 0–0.5)
IMM GRANULOCYTES NFR BLD AUTO: 1 % (ref 0–5)
KETONES UR QL STRIP.AUTO: 15 MG/DL
LACTATE BLD-SCNC: 2.64 MMOL/L (ref 0.5–1.9)
LACTATE BLD-SCNC: 2.64 MMOL/L (ref 0.5–1.9)
LACTATE SERPL-SCNC: 3.1 MMOL/L (ref 0.4–2)
LEUKOCYTE ESTERASE UR QL STRIP.AUTO: ABNORMAL
LIPASE SERPL-CCNC: 273 U/L (ref 73–393)
LYMPHOCYTES # BLD: 1.4 K/UL (ref 0.5–4.6)
LYMPHOCYTES NFR BLD: 21 % (ref 13–44)
MCH RBC QN AUTO: 31.9 PG (ref 26.1–32.9)
MCHC RBC AUTO-ENTMCNC: 32.5 G/DL (ref 31.4–35)
MCV RBC AUTO: 98.2 FL (ref 79.6–97.8)
MONOCYTES # BLD: 0.2 K/UL (ref 0.1–1.3)
MONOCYTES NFR BLD: 3 % (ref 4–12)
NEUTS SEG # BLD: 4.9 K/UL (ref 1.7–8.2)
NEUTS SEG NFR BLD: 74 % (ref 43–78)
NITRITE UR QL STRIP.AUTO: POSITIVE
NRBC # BLD: 0 K/UL (ref 0–0.2)
PH UR STRIP: 5 [PH] (ref 5–9)
PLATELET # BLD AUTO: 230 K/UL (ref 150–450)
PMV BLD AUTO: 10.4 FL (ref 9.4–12.3)
POTASSIUM SERPL-SCNC: 4 MMOL/L (ref 3.5–5.1)
PROT SERPL-MCNC: 7.1 G/DL (ref 6.3–8.2)
PROT UR STRIP-MCNC: 100 MG/DL
RBC # BLD AUTO: 4.45 M/UL (ref 4.05–5.2)
RBC #/AREA URNS HPF: ABNORMAL /HPF
SODIUM SERPL-SCNC: 131 MMOL/L (ref 136–145)
SP GR UR REFRACTOMETRY: 1.02 (ref 1–1.02)
UROBILINOGEN UR QL STRIP.AUTO: 1 EU/DL (ref 0.2–1)
WBC # BLD AUTO: 6.6 K/UL (ref 4.3–11.1)
WBC URNS QL MICRO: ABNORMAL /HPF

## 2019-09-16 PROCEDURE — 87449 NOS EACH ORGANISM AG IA: CPT

## 2019-09-16 PROCEDURE — 83690 ASSAY OF LIPASE: CPT

## 2019-09-16 PROCEDURE — 80053 COMPREHEN METABOLIC PANEL: CPT

## 2019-09-16 PROCEDURE — 74176 CT ABD & PELVIS W/O CONTRAST: CPT

## 2019-09-16 PROCEDURE — 85025 COMPLETE CBC W/AUTO DIFF WBC: CPT

## 2019-09-16 PROCEDURE — 96375 TX/PRO/DX INJ NEW DRUG ADDON: CPT | Performed by: EMERGENCY MEDICINE

## 2019-09-16 PROCEDURE — 81001 URINALYSIS AUTO W/SCOPE: CPT

## 2019-09-16 PROCEDURE — 99285 EMERGENCY DEPT VISIT HI MDM: CPT | Performed by: EMERGENCY MEDICINE

## 2019-09-16 PROCEDURE — 74011250637 HC RX REV CODE- 250/637: Performed by: FAMILY MEDICINE

## 2019-09-16 PROCEDURE — 74011250636 HC RX REV CODE- 250/636: Performed by: EMERGENCY MEDICINE

## 2019-09-16 PROCEDURE — 74011250636 HC RX REV CODE- 250/636: Performed by: FAMILY MEDICINE

## 2019-09-16 PROCEDURE — 74011250636 HC RX REV CODE- 250/636: Performed by: HOSPITALIST

## 2019-09-16 PROCEDURE — 87086 URINE CULTURE/COLONY COUNT: CPT

## 2019-09-16 PROCEDURE — 96374 THER/PROPH/DIAG INJ IV PUSH: CPT | Performed by: EMERGENCY MEDICINE

## 2019-09-16 PROCEDURE — 74011000258 HC RX REV CODE- 258: Performed by: EMERGENCY MEDICINE

## 2019-09-16 PROCEDURE — 77030020263 HC SOL INJ SOD CL0.9% LFCR 1000ML

## 2019-09-16 PROCEDURE — 65660000000 HC RM CCU STEPDOWN

## 2019-09-16 PROCEDURE — 87040 BLOOD CULTURE FOR BACTERIA: CPT

## 2019-09-16 PROCEDURE — 83605 ASSAY OF LACTIC ACID: CPT

## 2019-09-16 PROCEDURE — 36415 COLL VENOUS BLD VENIPUNCTURE: CPT

## 2019-09-16 RX ORDER — SODIUM CHLORIDE 0.9 % (FLUSH) 0.9 %
5-40 SYRINGE (ML) INJECTION AS NEEDED
Status: DISCONTINUED | OUTPATIENT
Start: 2019-09-16 | End: 2019-09-23 | Stop reason: HOSPADM

## 2019-09-16 RX ORDER — HEPARIN SODIUM 5000 [USP'U]/ML
5000 INJECTION, SOLUTION INTRAVENOUS; SUBCUTANEOUS EVERY 8 HOURS
Status: DISCONTINUED | OUTPATIENT
Start: 2019-09-16 | End: 2019-09-23 | Stop reason: HOSPADM

## 2019-09-16 RX ORDER — NALOXONE HYDROCHLORIDE 0.4 MG/ML
0.4 INJECTION, SOLUTION INTRAMUSCULAR; INTRAVENOUS; SUBCUTANEOUS AS NEEDED
Status: DISCONTINUED | OUTPATIENT
Start: 2019-09-16 | End: 2019-09-23 | Stop reason: HOSPADM

## 2019-09-16 RX ORDER — SODIUM CHLORIDE 9 MG/ML
75 INJECTION, SOLUTION INTRAVENOUS CONTINUOUS
Status: DISCONTINUED | OUTPATIENT
Start: 2019-09-16 | End: 2019-09-18

## 2019-09-16 RX ORDER — ONDANSETRON 2 MG/ML
4 INJECTION INTRAMUSCULAR; INTRAVENOUS
Status: COMPLETED | OUTPATIENT
Start: 2019-09-16 | End: 2019-09-16

## 2019-09-16 RX ORDER — CARVEDILOL 6.25 MG/1
3.12 TABLET ORAL 2 TIMES DAILY WITH MEALS
Status: DISCONTINUED | OUTPATIENT
Start: 2019-09-16 | End: 2019-09-18

## 2019-09-16 RX ORDER — DIPHENHYDRAMINE HYDROCHLORIDE 50 MG/ML
12.5 INJECTION, SOLUTION INTRAMUSCULAR; INTRAVENOUS
Status: DISCONTINUED | OUTPATIENT
Start: 2019-09-16 | End: 2019-09-23 | Stop reason: HOSPADM

## 2019-09-16 RX ORDER — METRONIDAZOLE 500 MG/100ML
500 INJECTION, SOLUTION INTRAVENOUS
Status: COMPLETED | OUTPATIENT
Start: 2019-09-16 | End: 2019-09-16

## 2019-09-16 RX ORDER — SODIUM CHLORIDE 0.9 % (FLUSH) 0.9 %
5-40 SYRINGE (ML) INJECTION EVERY 8 HOURS
Status: DISCONTINUED | OUTPATIENT
Start: 2019-09-16 | End: 2019-09-23 | Stop reason: HOSPADM

## 2019-09-16 RX ORDER — METRONIDAZOLE 500 MG/100ML
500 INJECTION, SOLUTION INTRAVENOUS EVERY 8 HOURS
Status: DISCONTINUED | OUTPATIENT
Start: 2019-09-16 | End: 2019-09-18

## 2019-09-16 RX ORDER — ONDANSETRON 2 MG/ML
4 INJECTION INTRAMUSCULAR; INTRAVENOUS
Status: DISCONTINUED | OUTPATIENT
Start: 2019-09-16 | End: 2019-09-23 | Stop reason: HOSPADM

## 2019-09-16 RX ORDER — MORPHINE SULFATE 4 MG/ML
4 INJECTION INTRAVENOUS
Status: COMPLETED | OUTPATIENT
Start: 2019-09-16 | End: 2019-09-16

## 2019-09-16 RX ORDER — CIPROFLOXACIN 2 MG/ML
400 INJECTION, SOLUTION INTRAVENOUS EVERY 24 HOURS
Status: DISCONTINUED | OUTPATIENT
Start: 2019-09-16 | End: 2019-09-18

## 2019-09-16 RX ORDER — SODIUM CHLORIDE 0.9 % (FLUSH) 0.9 %
10 SYRINGE (ML) INJECTION
Status: DISCONTINUED | OUTPATIENT
Start: 2019-09-16 | End: 2019-09-16

## 2019-09-16 RX ORDER — MORPHINE SULFATE 2 MG/ML
1 INJECTION, SOLUTION INTRAMUSCULAR; INTRAVENOUS
Status: DISCONTINUED | OUTPATIENT
Start: 2019-09-16 | End: 2019-09-23 | Stop reason: HOSPADM

## 2019-09-16 RX ADMIN — MORPHINE SULFATE 1 MG: 2 INJECTION, SOLUTION INTRAMUSCULAR; INTRAVENOUS at 20:35

## 2019-09-16 RX ADMIN — SODIUM CHLORIDE 125 ML/HR: 900 INJECTION, SOLUTION INTRAVENOUS at 21:50

## 2019-09-16 RX ADMIN — VANCOMYCIN HYDROCHLORIDE 125 MG: 1 INJECTION, POWDER, LYOPHILIZED, FOR SOLUTION INTRAVENOUS at 23:16

## 2019-09-16 RX ADMIN — SODIUM CHLORIDE 1000 ML: 900 INJECTION, SOLUTION INTRAVENOUS at 17:47

## 2019-09-16 RX ADMIN — CEFTRIAXONE 1 G: 1 INJECTION, POWDER, FOR SOLUTION INTRAMUSCULAR; INTRAVENOUS at 17:31

## 2019-09-16 RX ADMIN — SODIUM CHLORIDE 1000 ML: 900 INJECTION, SOLUTION INTRAVENOUS at 14:48

## 2019-09-16 RX ADMIN — METRONIDAZOLE 500 MG: 500 INJECTION, SOLUTION INTRAVENOUS at 23:15

## 2019-09-16 RX ADMIN — Medication 10 ML: at 23:16

## 2019-09-16 RX ADMIN — ONDANSETRON 4 MG: 2 INJECTION INTRAMUSCULAR; INTRAVENOUS at 15:10

## 2019-09-16 RX ADMIN — MORPHINE SULFATE 4 MG: 4 INJECTION INTRAVENOUS at 15:10

## 2019-09-16 RX ADMIN — METRONIDAZOLE 500 MG: 500 INJECTION, SOLUTION INTRAVENOUS at 17:34

## 2019-09-16 RX ADMIN — HEPARIN SODIUM 5000 UNITS: 5000 INJECTION INTRAVENOUS; SUBCUTANEOUS at 23:16

## 2019-09-16 NOTE — ED NOTES
TRANSFER - OUT REPORT:    Verbal report given to Adriana Resendiz RN(name) on Elia Grace  being transferred to 367(unit) for routine progression of care       Report consisted of patients Situation, Background, Assessment and   Recommendations(SBAR). Information from the following report(s) SBAR, ED Summary, [facility] and Recent Results was reviewed with the receiving nurse. Lines:   Peripheral IV 09/16/19 Right Antecubital (Active)   Site Assessment Clean, dry, & intact 9/16/2019  5:05 PM   Phlebitis Assessment 0 9/16/2019  5:05 PM   Infiltration Assessment 0 9/16/2019  5:05 PM   Dressing Type Gauze;Tape;Transparent 9/16/2019  5:05 PM   Hub Color/Line Status Green;Flushed 9/16/2019  5:05 PM       Peripheral IV 09/16/19 Left Antecubital (Active)        Opportunity for questions and clarification was provided.       Patient transported with:   [employer]

## 2019-09-16 NOTE — ED TRIAGE NOTES
[facility] brought pt from home. They state pt had sudden onset of abdominal pain, nausea, vomiting and diarrhea this morning.  EKG 'was clear.'

## 2019-09-16 NOTE — ED PROVIDER NOTES
Renny Valencia is a 80 y.o. female who presents to the ED with a chief complaint of diarrhea, abdominal pain, and vomiting. Symptoms started this morning about 10:30 AM.  With watery diarrhea. She then has developed multiple episodes of vomiting. Followed by a generalized abdominal pain. She denies ever having any abdominal surgeries. Her pain is a 10 out of 10 and constant. She has a lot of nausea associated with it. No prior similar symptoms. Past Medical History:   Diagnosis Date    ADHD     Arthritis     Hypertension        History reviewed. No pertinent surgical history. History reviewed. No pertinent family history.     Social History     Socioeconomic History    Marital status:      Spouse name: Not on file    Number of children: Not on file    Years of education: Not on file    Highest education level: Not on file   Occupational History    Not on file   Social Needs    Financial resource strain: Not on file    Food insecurity:     Worry: Not on file     Inability: Not on file    Transportation needs:     Medical: Not on file     Non-medical: Not on file   Tobacco Use    Smoking status: Never Smoker    Smokeless tobacco: Never Used   Substance and Sexual Activity    Alcohol use: No     Frequency: Never    Drug use: No    Sexual activity: Not Currently   Lifestyle    Physical activity:     Days per week: Not on file     Minutes per session: Not on file    Stress: Not on file   Relationships    Social connections:     Talks on phone: Not on file     Gets together: Not on file     Attends [employer] service: Not on file     Active member of club or organization: Not on file     Attends meetings of clubs or organizations: Not on file     Relationship status: Not on file    Intimate partner violence:     Fear of current or ex partner: Not on file     Emotionally abused: Not on file     Physically abused: Not on file     Forced sexual activity: Not on file   Other Topics Concern    Not on file   Social History Narrative    Not on file         ALLERGIES: Patient has no known allergies. Review of Systems   Constitutional: Negative for chills and fever. Respiratory: Negative for chest tightness, shortness of breath, wheezing and stridor. Cardiovascular: Negative for chest pain and palpitations. Gastrointestinal: Negative for abdominal pain, diarrhea, nausea and vomiting. Genitourinary: Negative for difficulty urinating, dysuria and flank pain. Musculoskeletal: Negative for arthralgias, neck pain and neck stiffness. Skin: Negative. Negative for color change, rash and wound. Psychiatric/Behavioral: Negative for agitation and self-injury. The patient is not nervous/anxious. All other systems reviewed and are negative. Vitals:    09/16/19 1300   BP: 131/59   Pulse: 80   Resp: 16   Temp: 99.1 °F (37.3 °C)   SpO2: 96%   Weight: 55.3 kg (122 lb)   Height: 5' 4\" (1.626 m)            Physical Exam   Constitutional: She appears well-developed and well-nourished. Non-toxic appearance. She does not appear ill. She appears distressed (Appears nauseated with food colored vomit present.). HENT:   Head: Normocephalic and atraumatic. Mouth/Throat: Oropharynx is clear and moist. No oropharyngeal exudate. Eyes: Pupils are equal, round, and reactive to light. Conjunctivae and EOM are normal. No scleral icterus. Neck: Normal range of motion. Cardiovascular: Normal rate, regular rhythm and normal heart sounds. Pulmonary/Chest: Effort normal. No respiratory distress. Abdominal: Normal appearance and bowel sounds are normal. She exhibits no distension and no ascites. There is generalized tenderness. There is guarding. There is no rigidity and no rebound. No hernia. Neurological: She is alert. Skin: Skin is warm. Capillary refill takes less than 2 seconds. Psychiatric: She has a normal mood and affect.  Her behavior is normal.   Nursing note and vitals reviewed. MDM  Number of Diagnoses or Management Options  Colitis:   Diagnosis management comments: I suspect possible C. difficile and she was treated with Rocephin and Flagyl. CT shows colitis. I have paged hospitalist for admission. Sharan Moore MD; 9/16/2019 @5:21 PM Voice dictation software was used during the making of this note. This software is not perfect and grammatical and other typographical errors may be present.   This note has not been proofread for errors.  ===================================================================          Amount and/or Complexity of Data Reviewed  Clinical lab tests: ordered and reviewed (Results for orders placed or performed during the hospital encounter of 09/16/19  -CBC WITH AUTOMATED DIFF       Result                      Value             Ref Range           WBC                         6.6               4.3 - 11.1 K*       RBC                         4.45              4.05 - 5.2 M*       HGB                         14.2              11.7 - 15.4 *       HCT                         43.7              35.8 - 46.3 %       MCV                         98.2 (H)          79.6 - 97.8 *       MCH                         31.9              26.1 - 32.9 *       MCHC                        32.5              31.4 - 35.0 *       RDW                         13.2              11.9 - 14.6 %       PLATELET                    230               150 - 450 K/*       MPV                         10.4              9.4 - 12.3 FL       ABSOLUTE NRBC               0.00              0.0 - 0.2 K/*       DF                          AUTOMATED                             NEUTROPHILS                 74                43 - 78 %           LYMPHOCYTES                 21                13 - 44 %           MONOCYTES                   3 (L)             4.0 - 12.0 %        EOSINOPHILS                 1                 0.5 - 7.8 %         BASOPHILS                   1                 0.0 - 2.0 % IMMATURE GRANULOCYTES       1                 0.0 - 5.0 %         ABS. NEUTROPHILS            4.9               1.7 - 8.2 K/*       ABS. LYMPHOCYTES            1.4               0.5 - 4.6 K/*       ABS. MONOCYTES              0.2               0.1 - 1.3 K/*       ABS. EOSINOPHILS            0.1               0.0 - 0.8 K/*       ABS. BASOPHILS              0.0               0.0 - 0.2 K/*       ABS. IMM. GRANS.            0.0               0.0 - 0.5 K/*  -METABOLIC PANEL, COMPREHENSIVE       Result                      Value             Ref Range           Sodium                      131 (L)           136 - 145 mm*       Potassium                   4.0               3.5 - 5.1 mm*       Chloride                    105               98 - 107 mmo*       CO2                         23                21 - 32 mmol*       Anion gap                   3 (L)             7 - 16 mmol/L       Glucose                     179 (H)           65 - 100 mg/*       BUN                         40 (H)            8 - 23 MG/DL        Creatinine                  1.64 (H)          0.6 - 1.0 MG*       GFR est AA                  38 (L)            >60 ml/min/1*       GFR est non-AA              31 (L)            >60 ml/min/1*       Calcium                     11.1 (H)          8.3 - 10.4 M*       Bilirubin, total            0.9               0.2 - 1.1 MG*       ALT (SGPT)                  18                12 - 65 U/L         AST (SGOT)                  35                15 - 37 U/L         Alk.  phosphatase            157 (H)           50 - 136 U/L        Protein, total              7.1               6.3 - 8.2 g/*       Albumin                     3.2               3.2 - 4.6 g/*       Globulin                    3.9 (H)           2.3 - 3.5 g/*       A-G Ratio                   0.8 (L)           1.2 - 3.5      -LIPASE       Result                      Value             Ref Range           Lipase                      273               73 - 393 U/L   -URINALYSIS W/ RFLX MICROSCOPIC       Result                      Value             Ref Range           Color                       RED                                   Appearance                  CLOUDY                                Specific gravity            1.024 (H)         1.001 - 1.02*       pH (UA)                     5.0               5.0 - 9.0           Protein                     100 (A)           NEG mg/dL           Glucose                     NEGATIVE          mg/dL               Ketone                      15 (A)            NEG mg/dL           Bilirubin                   LARGE (A)         NEG                 Blood                       NEGATIVE          NEG                 Urobilinogen                1.0               0.2 - 1.0 EU*       Nitrites                    POSITIVE (A)      NEG                 Leukocyte Esterase          MODERATE (A)      NEG                 WBC                         5-10              0 /hpf              RBC                         0-3               0 /hpf              Epithelial cells            0-3               0 /hpf              Bacteria                    1+ (H)            0 /hpf              Casts                       HYALINE           0 /lpf              Amorphous Crystals          2+ (H)            0              -POC LACTIC ACID       Result                      Value             Ref Range           Lactic Acid (POC)           2.64 (H)          0.5 - 1.9 mm* )  Tests in the radiology section of CPT®: ordered and reviewed (Ct Abd Pelv Wo Cont    Result Date: 9/16/2019  CT ABDOMEN AND PELVIS WITH CONTRAST. HISTORY: Sudden onset abdominal pain. COMPARISON: None TECHNIQUE: 5 mm axial scans from above the diaphragms to the pubic symphysis. Patient vomited the oral contrast. Radiation dose reduction techniques were used for this study.   Our CT scanners use one or more of the following:  Automated exposure control, adjustment of the mA and or kV according to patient size, iterative reconstruction. FINDINGS: Abdomen: The lung bases are clear. No gross focal parenchymal abnormality identified within the liver or spleen. There are small calcified gallstones. The biliary tree is not dilated. The pancreas is unremarkable. There is mural thickening of the entire colon. There are stranding densities in the small amount of fluid. No free air. There are The kidneys are normal size. Stones in the left kidney and left renal atrophy. The adrenal glands are normal size. Aorta is normal caliber and calcified. There is scoliosis. Pelvis: Bilateral hip surgery obscures detail. No free air. The urinary bladder unremarkable. IMPRESSION:  Mural thickening with stranding densities of the entire colon. This could be ischemic or inflammatory.  C. difficile should be considered.    )  Discuss the patient with other providers: yes           Procedures

## 2019-09-16 NOTE — H&P
Hospitalist H&P Note     Admit Date:  2019 12:57 PM   Name:  Page Monae   Age:  80 y.o.  :  1930   MRN:  [id number]   PCP:  Leeanna Lopez MD  Treatment Team: Primary Nurse: Marie Colon RN  Abdominal pain ,diarrhea ,vomiting  HPI:   Pt not a good historian. No family at bedside . Later on after admission was able to speak to son-Kourtney Montgomery and daughter    in law-  According to them pt lives alone, has care giver who comes half a day(care giver had viral illness in Thursday- was off for few days and came back to work today). Pt has diagnosed with afib after her hip surgery in feb- presently not on any medications- says pt is on coreg and metoprolol(? Why). Also on medications for depression and htn. Chronic pain medications for hip pain. PCP has been changing medications for bp as it was uncontrolled. Pt was ok this am- started having diarrhea,vomiting and abdominal pain- hence care giver called 911. Pt main c/o was abdominal pain at the time of my examination- says pain medications didn't help. Presently has rectal tube. Pt otherwise didn't c/o headache or dizziness or chest pain or sob or rectal bleeding or hemoptysis. ua nitrite positive  Creatinine  1.64,gfr 31,lactic acid 2.64  C.diff pending  Ct abd pelvis with out contrast-  Mural thickening with stranding densities of the entire colon. This  could be ischemic or inflammatory. C. difficile should be considered. Pt will be admitted for colitis ischemic vs inflammatory. 10 systems reviewed and negative except as noted in HPI. Past Medical History:   Diagnosis Date    ADHD     Arthritis     Hypertension       surgical history. - bilateral hip surgery  No Known Allergies   Social History     Tobacco Use    Smoking status: Never Smoker    Smokeless tobacco: Never Used   Substance Use Topics    Alcohol use: No     Frequency: Never       family history- htn  There is no immunization history for the selected administration types on file for this patient. PTA Medications:  Prior to Admission Medications   Prescriptions Last Dose Informant Patient Reported? Taking?   carvedilol (COREG) 3.125 mg tablet   Yes No   Sig: Take 3.125 mg by mouth two (2) times daily (with meals). ergocalciferol (ERGOCALCIFEROL) 50,000 unit capsule   Yes No   Sig: Take 50,000 Units by mouth. folic acid (FOLVITE) 1 mg tablet   Yes No   Sig: Take 1 mg by mouth.   metoprolol succinate (TOPROL-XL) 25 mg XL tablet   Yes No   Sig: Take 25 mg by mouth daily. potassium chloride (K-DUR, KLOR-CON) 20 mEq tablet   No No   Sig: Take 1 Tab by mouth two (2) times a day. senna-docusate (PERICOLACE) 8.6-50 mg per tablet   Yes No   Sig: Take  by mouth. Facility-Administered Medications: None       Objective:     Patient Vitals for the past 24 hrs:   Temp Pulse Resp BP SpO2   09/16/19 1509    177/88    09/16/19 1444    124/78    09/16/19 1436    141/82    09/16/19 1300 99.1 °F (37.3 °C) 80 16 131/59 96 %     Oxygen Therapy  O2 Sat (%): 96 % (09/16/19 1300)  O2 Device: Room air (09/16/19 1300)  No intake or output data in the 24 hours ending 09/16/19 1804    Physical Exam:  General:    Well nourished. Alert. Eyes:   Normal sclera. Extraocular movements intact. ENT:  Normocephalic, atraumatic. Moist mucous membranes  CV:   RRR. No murmur, rub, or gallop. Lungs:  CTAB. No wheezing, rhonchi, or rales. Abdomen: Tender all over, more left lower quadrant,mild guarding, no rebound, normal bowel sounds  Extremities: Warm and dry. No cyanosis or edema. Neurologic: CN II-XII grossly intact. Sensation intact. Skin:     No rashes or jaundice. Psych:  Normal mood and affect. Has rectal tube    I reviewed the labs, imaging,  telemetry, and other studies done this admission.   Data Review:   Recent Results (from the past 24 hour(s))   CBC WITH AUTOMATED DIFF    Collection Time: 09/16/19  2:46 PM   Result Value Ref Range WBC 6.6 4.3 - 11.1 K/uL    RBC 4.45 4.05 - 5.2 M/uL    HGB 14.2 11.7 - 15.4 g/dL    HCT 43.7 35.8 - 46.3 %    MCV 98.2 (H) 79.6 - 97.8 FL    MCH 31.9 26.1 - 32.9 PG    MCHC 32.5 31.4 - 35.0 g/dL    RDW 13.2 11.9 - 14.6 %    PLATELET 185 021 - 319 K/uL    MPV 10.4 9.4 - 12.3 FL    ABSOLUTE NRBC 0.00 0.0 - 0.2 K/uL    DF AUTOMATED      NEUTROPHILS 74 43 - 78 %    LYMPHOCYTES 21 13 - 44 %    MONOCYTES 3 (L) 4.0 - 12.0 %    EOSINOPHILS 1 0.5 - 7.8 %    BASOPHILS 1 0.0 - 2.0 %    IMMATURE GRANULOCYTES 1 0.0 - 5.0 %    ABS. NEUTROPHILS 4.9 1.7 - 8.2 K/UL    ABS. LYMPHOCYTES 1.4 0.5 - 4.6 K/UL    ABS. MONOCYTES 0.2 0.1 - 1.3 K/UL    ABS. EOSINOPHILS 0.1 0.0 - 0.8 K/UL    ABS. BASOPHILS 0.0 0.0 - 0.2 K/UL    ABS. IMM. GRANS. 0.0 0.0 - 0.5 K/UL   METABOLIC PANEL, COMPREHENSIVE    Collection Time: 09/16/19  2:46 PM   Result Value Ref Range    Sodium 131 (L) 136 - 145 mmol/L    Potassium 4.0 3.5 - 5.1 mmol/L    Chloride 105 98 - 107 mmol/L    CO2 23 21 - 32 mmol/L    Anion gap 3 (L) 7 - 16 mmol/L    Glucose 179 (H) 65 - 100 mg/dL    BUN 40 (H) 8 - 23 MG/DL    Creatinine 1.64 (H) 0.6 - 1.0 MG/DL    GFR est AA 38 (L) >60 ml/min/1.73m2    GFR est non-AA 31 (L) >60 ml/min/1.73m2    Calcium 11.1 (H) 8.3 - 10.4 MG/DL    Bilirubin, total 0.9 0.2 - 1.1 MG/DL    ALT (SGPT) 18 12 - 65 U/L    AST (SGOT) 35 15 - 37 U/L    Alk.  phosphatase 157 (H) 50 - 136 U/L    Protein, total 7.1 6.3 - 8.2 g/dL    Albumin 3.2 3.2 - 4.6 g/dL    Globulin 3.9 (H) 2.3 - 3.5 g/dL    A-G Ratio 0.8 (L) 1.2 - 3.5     LIPASE    Collection Time: 09/16/19  2:46 PM   Result Value Ref Range    Lipase 273 73 - 393 U/L   URINALYSIS W/ RFLX MICROSCOPIC    Collection Time: 09/16/19  2:46 PM   Result Value Ref Range    Color RED      Appearance CLOUDY      Specific gravity 1.024 (H) 1.001 - 1.023      pH (UA) 5.0 5.0 - 9.0      Protein 100 (A) NEG mg/dL    Glucose NEGATIVE  mg/dL    Ketone 15 (A) NEG mg/dL    Bilirubin LARGE (A) NEG      Blood NEGATIVE  NEG Urobilinogen 1.0 0.2 - 1.0 EU/dL    Nitrites POSITIVE (A) NEG      Leukocyte Esterase MODERATE (A) NEG      WBC 5-10 0 /hpf    RBC 0-3 0 /hpf    Epithelial cells 0-3 0 /hpf    Bacteria 1+ (H) 0 /hpf    Casts HYALINE 0 /lpf    Amorphous Crystals 2+ (H) 0   POC LACTIC ACID    Collection Time: 09/16/19  3:23 PM   Result Value Ref Range    Lactic Acid (POC) 2.64 (H) 0.5 - 1.9 mmol/L       All Micro Results     Procedure Component Value Units Date/Time    CULTURE, URINE [[id number]] Collected:  09/16/19 1446    Order Status:  Completed Specimen:  Urine from Clean catch Updated:  09/16/19 1755    CULTURE, BLOOD [[id number]] Collected:  09/16/19 1710    Order Status:  Completed Specimen:  Blood Updated:  09/16/19 1715    CULTURE, BLOOD [[id number]] Collected:  09/16/19 1710    Order Status:  Completed Specimen:  Blood Updated:  09/16/19 1715    C. DIFFICILE AG & TOXIN A/B [[id number]] Collected:  09/16/19 1607    Order Status:  Completed Specimen:  Stool Updated:  09/16/19 1613          Other Studies:  Ct Abd Pelv Wo Cont    Result Date: 9/16/2019  CT ABDOMEN AND PELVIS WITH CONTRAST. HISTORY: Sudden onset abdominal pain. COMPARISON: None TECHNIQUE: 5 mm axial scans from above the diaphragms to the pubic symphysis. Patient vomited the oral contrast. Radiation dose reduction techniques were used for this study. Our CT scanners use one or more of the following:  Automated exposure control, adjustment of the mA and or kV according to patient size, iterative reconstruction. FINDINGS: Abdomen: The lung bases are clear. No gross focal parenchymal abnormality identified within the liver or spleen. There are small calcified gallstones. The biliary tree is not dilated. The pancreas is unremarkable. There is mural thickening of the entire colon. There are stranding densities in the small amount of fluid. No free air. There are The kidneys are normal size. Stones in the left kidney and left renal atrophy.  The adrenal glands are normal size. Aorta is normal caliber and calcified. There is scoliosis. Pelvis: Bilateral hip surgery obscures detail. No free air. The urinary bladder unremarkable. IMPRESSION:  Mural thickening with stranding densities of the entire colon. This could be ischemic or inflammatory. C. difficile should be considered. Assessment and Plan:     Hospital Problems as of 9/16/2019 Never Reviewed          Codes Class Noted - Resolved POA    * (Principal) Colitis ICD-10-CM: K52.9  ICD-9-CM: 558.9  9/16/2019 - Present Unknown        UTI (urinary tract infection) ICD-10-CM: N39.0  ICD-9-CM: 599.0  9/16/2019 - Present Unknown        MONICA (acute kidney injury) ([facility] [address] ICD-10-CM: N17.9  ICD-9-CM: 584.9  9/16/2019 - Present Unknown        HTN (hypertension) ICD-10-CM: I10  ICD-9-CM: 401.9  9/16/2019 - Present Unknown              PLAN:  - colitis- ischemic vs inflammatory- npo except for ice chips and meds- if lactic acid level elevating may need surgical vs gi consult. - cont antibiotics and ivf.  - monica- cont ivf  - uti- cont cipro  - htn  - h/o afib not on any anticoagulation- even asa. Advanced life care discussed with son and daughter in law- son decided on DNR status for the pt. DVT ppx:  heparin  Anticipated DC needs:    Code status:  DNR.   Estimated LOS:  Greater than 2 midnights  Risk:  high    Signed:  Lindy Grullon MD

## 2019-09-16 NOTE — PROGRESS NOTES
TRANSFER - IN REPORT:    Verbal report received from Karli Cummings, [facility] (name) on Kamille Mejia  being received from ED (unit) for routine progression of care      Report consisted of patients Situation, Background, Assessment and   Recommendations(SBAR). Information from the following report(s) SBAR, Kardex, ED Summary, Intake/Output, MAR and Recent Results was reviewed with the receiving nurse. Opportunity for questions and clarification was provided. Assessment completed upon patients arrival to unit and care assumed.

## 2019-09-17 LAB
ANION GAP SERPL CALC-SCNC: 8 MMOL/L (ref 7–16)
BASOPHILS # BLD: 0 K/UL (ref 0–0.2)
BASOPHILS NFR BLD: 0 % (ref 0–2)
BUN SERPL-MCNC: 45 MG/DL (ref 8–23)
C DIFF GDH STL QL: NORMAL
C DIFF TOX A+B STL QL IA: NORMAL
CALCIUM SERPL-MCNC: 9 MG/DL (ref 8.3–10.4)
CHLORIDE SERPL-SCNC: 112 MMOL/L (ref 98–107)
CLINICAL CONSIDERATION: NORMAL
CO2 SERPL-SCNC: 20 MMOL/L (ref 21–32)
CREAT SERPL-MCNC: 1.61 MG/DL (ref 0.6–1)
DIFFERENTIAL METHOD BLD: ABNORMAL
EOSINOPHIL # BLD: 0 K/UL (ref 0–0.8)
EOSINOPHIL NFR BLD: 0 % (ref 0.5–7.8)
ERYTHROCYTE [DISTWIDTH] IN BLOOD BY AUTOMATED COUNT: 13.4 % (ref 11.9–14.6)
GLUCOSE SERPL-MCNC: 101 MG/DL (ref 65–100)
HCT VFR BLD AUTO: 36.5 % (ref 35.8–46.3)
HGB BLD-MCNC: 11.7 G/DL (ref 11.7–15.4)
IMM GRANULOCYTES # BLD AUTO: 0 K/UL (ref 0–0.5)
IMM GRANULOCYTES NFR BLD AUTO: 0 % (ref 0–5)
INTERPRETATION: NORMAL
LACTATE SERPL-SCNC: 1.4 MMOL/L (ref 0.4–2)
LYMPHOCYTES # BLD: 0.7 K/UL (ref 0.5–4.6)
LYMPHOCYTES NFR BLD: 8 % (ref 13–44)
MCH RBC QN AUTO: 32 PG (ref 26.1–32.9)
MCHC RBC AUTO-ENTMCNC: 32.1 G/DL (ref 31.4–35)
MCV RBC AUTO: 99.7 FL (ref 79.6–97.8)
MONOCYTES # BLD: 0.5 K/UL (ref 0.1–1.3)
MONOCYTES NFR BLD: 5 % (ref 4–12)
NEUTS SEG # BLD: 7.9 K/UL (ref 1.7–8.2)
NEUTS SEG NFR BLD: 87 % (ref 43–78)
NRBC # BLD: 0 K/UL (ref 0–0.2)
PCR REFLEX: NORMAL
PLATELET # BLD AUTO: 150 K/UL (ref 150–450)
PLATELET COMMENTS,PCOM: ADEQUATE
PMV BLD AUTO: 11.5 FL (ref 9.4–12.3)
POTASSIUM SERPL-SCNC: 4.3 MMOL/L (ref 3.5–5.1)
RBC # BLD AUTO: 3.66 M/UL (ref 4.05–5.2)
RBC MORPH BLD: ABNORMAL
SODIUM SERPL-SCNC: 140 MMOL/L (ref 136–145)
WBC # BLD AUTO: 9.1 K/UL (ref 4.3–11.1)
WBC MORPH BLD: ABNORMAL

## 2019-09-17 PROCEDURE — 36415 COLL VENOUS BLD VENIPUNCTURE: CPT

## 2019-09-17 PROCEDURE — 86580 TB INTRADERMAL TEST: CPT | Performed by: FAMILY MEDICINE

## 2019-09-17 PROCEDURE — 77030020263 HC SOL INJ SOD CL0.9% LFCR 1000ML

## 2019-09-17 PROCEDURE — 83605 ASSAY OF LACTIC ACID: CPT

## 2019-09-17 PROCEDURE — 74011250636 HC RX REV CODE- 250/636: Performed by: HOSPITALIST

## 2019-09-17 PROCEDURE — 80048 BASIC METABOLIC PNL TOTAL CA: CPT

## 2019-09-17 PROCEDURE — 85025 COMPLETE CBC W/AUTO DIFF WBC: CPT

## 2019-09-17 PROCEDURE — 74011250637 HC RX REV CODE- 250/637: Performed by: INTERNAL MEDICINE

## 2019-09-17 PROCEDURE — 74011250637 HC RX REV CODE- 250/637: Performed by: FAMILY MEDICINE

## 2019-09-17 PROCEDURE — 74011000302 HC RX REV CODE- 302: Performed by: FAMILY MEDICINE

## 2019-09-17 PROCEDURE — 74011250636 HC RX REV CODE- 250/636: Performed by: FAMILY MEDICINE

## 2019-09-17 PROCEDURE — 65270000029 HC RM PRIVATE

## 2019-09-17 RX ORDER — VENLAFAXINE HYDROCHLORIDE 150 MG/1
150 TABLET, EXTENDED RELEASE ORAL
COMMUNITY

## 2019-09-17 RX ORDER — HYDROCODONE BITARTRATE AND ACETAMINOPHEN 5; 325 MG/1; MG/1
1 TABLET ORAL
Status: DISCONTINUED | OUTPATIENT
Start: 2019-09-17 | End: 2019-09-23 | Stop reason: HOSPADM

## 2019-09-17 RX ORDER — AMLODIPINE BESYLATE 5 MG/1
5 TABLET ORAL DAILY
COMMUNITY
End: 2020-10-14 | Stop reason: ALTCHOICE

## 2019-09-17 RX ORDER — MIRTAZAPINE 15 MG/1
15 TABLET, FILM COATED ORAL
COMMUNITY

## 2019-09-17 RX ORDER — HYDROCODONE BITARTRATE AND ACETAMINOPHEN 5; 325 MG/1; MG/1
1 TABLET ORAL DAILY
Status: ON HOLD | COMMUNITY
End: 2019-09-23 | Stop reason: SDUPTHER

## 2019-09-17 RX ORDER — BENAZEPRIL HYDROCHLORIDE AND HYDROCHLOROTHIAZIDE 10; 12.5 MG/1; MG/1
1 TABLET ORAL DAILY
Status: ON HOLD | COMMUNITY
End: 2020-10-14 | Stop reason: ALTCHOICE

## 2019-09-17 RX ADMIN — SODIUM CHLORIDE 150 ML/HR: 900 INJECTION, SOLUTION INTRAVENOUS at 18:08

## 2019-09-17 RX ADMIN — CIPROFLOXACIN 400 MG: 2 INJECTION, SOLUTION INTRAVENOUS at 21:15

## 2019-09-17 RX ADMIN — VANCOMYCIN HYDROCHLORIDE 125 MG: 1 INJECTION, POWDER, LYOPHILIZED, FOR SOLUTION INTRAVENOUS at 06:27

## 2019-09-17 RX ADMIN — CARVEDILOL 3.12 MG: 6.25 TABLET, FILM COATED ORAL at 08:18

## 2019-09-17 RX ADMIN — TUBERCULIN PURIFIED PROTEIN DERIVATIVE 5 UNITS: 5 INJECTION, SOLUTION INTRADERMAL at 17:58

## 2019-09-17 RX ADMIN — METRONIDAZOLE 500 MG: 500 INJECTION, SOLUTION INTRAVENOUS at 17:57

## 2019-09-17 RX ADMIN — HYDROCODONE BITARTRATE AND ACETAMINOPHEN 1 TABLET: 5; 325 TABLET ORAL at 16:33

## 2019-09-17 RX ADMIN — VANCOMYCIN HYDROCHLORIDE 125 MG: 1 INJECTION, POWDER, LYOPHILIZED, FOR SOLUTION INTRAVENOUS at 21:15

## 2019-09-17 RX ADMIN — METRONIDAZOLE 500 MG: 500 INJECTION, SOLUTION INTRAVENOUS at 06:26

## 2019-09-17 RX ADMIN — Medication 10 ML: at 21:20

## 2019-09-17 RX ADMIN — HEPARIN SODIUM 5000 UNITS: 5000 INJECTION INTRAVENOUS; SUBCUTANEOUS at 06:26

## 2019-09-17 RX ADMIN — SODIUM CHLORIDE 125 ML/HR: 900 INJECTION, SOLUTION INTRAVENOUS at 10:00

## 2019-09-17 RX ADMIN — VANCOMYCIN HYDROCHLORIDE 125 MG: 1 INJECTION, POWDER, LYOPHILIZED, FOR SOLUTION INTRAVENOUS at 16:36

## 2019-09-17 RX ADMIN — CARVEDILOL 3.12 MG: 6.25 TABLET, FILM COATED ORAL at 17:53

## 2019-09-17 RX ADMIN — Medication 10 ML: at 06:26

## 2019-09-17 RX ADMIN — HEPARIN SODIUM 5000 UNITS: 5000 INJECTION INTRAVENOUS; SUBCUTANEOUS at 16:34

## 2019-09-17 RX ADMIN — VANCOMYCIN HYDROCHLORIDE 125 MG: 1 INJECTION, POWDER, LYOPHILIZED, FOR SOLUTION INTRAVENOUS at 10:48

## 2019-09-17 NOTE — PROGRESS NOTES
09/16/19 1930   Dual Skin Pressure Injury Assessment   Dual Skin Pressure Injury Assessment WDL   Second Care Provider (Based on [address] [place] [address]) Linda Almodovar RN   Skin Integumentary   Skin Integumentary (WDL) X   Skin Color Appropriate for ethnicity; Ecchymosis (comment)   Skin Condition/Temp Dry; Warm   Skin Integrity Abrasion  (scattered to BLE )

## 2019-09-17 NOTE — ED NOTES
Pt has copious amounts of liquid stool. Order for flexaseal requested from Dr. Celia Singh and verbal obtained. Flexaseal placed without incident, pt tolerated well and liquid stool draining into bag.

## 2019-09-17 NOTE — PROGRESS NOTES
Hospitalist Progress Note    2019  Admit Date: 2019 12:57 PM   NAME: Page Monae   :  1930   MRN:  [id number]   Attending: Jarocho Burnett MD  PCP:  Leeanna Lopez MD    SUBJECTIVE:   Christina Hernandez is an 81 yo F admitted  with pan-colitis. Seen with her DIL and caregiver at bedside. She is unable to provide reliable history due to dementia. Her caregiver states she seems to be in less pain today and is not yelling in pain as much. Review of Systems negative with exception of pertinent positives noted above  PHYSICAL EXAM     Visit Vitals  /75 (BP 1 Location: Left arm, BP Patient Position: At rest)   Pulse 72   Temp 98.3 °F (36.8 °C)   Resp 20   Ht 5' 4\" (1.626 m)   Wt 55.3 kg (122 lb)   SpO2 94%   BMI 20.94 kg/m²      Temp (24hrs), Av.9 °F (36.6 °C), Min:97.1 °F (36.2 °C), Max:98.5 °F (36.9 °C)    Patient Vitals for the past 24 hrs:   Temp Pulse Resp BP SpO2   19 1213 98.3 °F (36.8 °C) 72 20 117/75 94 %   19 0733 97.1 °F (36.2 °C) 77 18 139/61 96 %   19 0335 97.9 °F (36.6 °C) 74 16 117/51 92 %   19 0052 98.5 °F (36.9 °C) 72 16 107/66 96 %   19 1950 97.5 °F (36.4 °C) 71 18 120/65 95 %   19 1908  (!) 103   95 %   19 1906  (!) 112  154/66 96 %   19 1845  (!) 106   94 %   19 1830  (!) 118   94 %   19 1615  (!) 101   95 %   19 1545  (!) 108   97 %   19 1530  90   96 %   19 1515  90   94 %   19 1509    177/88    19 1444    124/78    19 1436    141/82        Oxygen Therapy  O2 Sat (%): 94 % (19 1213)  Pulse via Oximetry: 103 beats per minute (19 1908)  O2 Device: Room air (19 1300)    Intake/Output Summary (Last 24 hours) at 2019 1305  Last data filed at 2019 3430  Gross per 24 hour   Intake    Output 600 ml   Net -600 ml      General: No acute distress, elderly  Lungs:  CTA Bilaterally.    Heart:  Regular rate and rhythm,  No murmur, rub, or gallop  Abdomen: Soft, diffuse mild tenderness, + bowel sounds, rectal tube in place  Extremities: No cyanosis, clubbing or edema  Neurologic:  No focal deficits    Recent Results (from the past 24 hour(s))   CBC WITH AUTOMATED DIFF    Collection Time: 09/16/19  2:46 PM   Result Value Ref Range    WBC 6.6 4.3 - 11.1 K/uL    RBC 4.45 4.05 - 5.2 M/uL    HGB 14.2 11.7 - 15.4 g/dL    HCT 43.7 35.8 - 46.3 %    MCV 98.2 (H) 79.6 - 97.8 FL    MCH 31.9 26.1 - 32.9 PG    MCHC 32.5 31.4 - 35.0 g/dL    RDW 13.2 11.9 - 14.6 %    PLATELET 021 999 - 745 K/uL    MPV 10.4 9.4 - 12.3 FL    ABSOLUTE NRBC 0.00 0.0 - 0.2 K/uL    DF AUTOMATED      NEUTROPHILS 74 43 - 78 %    LYMPHOCYTES 21 13 - 44 %    MONOCYTES 3 (L) 4.0 - 12.0 %    EOSINOPHILS 1 0.5 - 7.8 %    BASOPHILS 1 0.0 - 2.0 %    IMMATURE GRANULOCYTES 1 0.0 - 5.0 %    ABS. NEUTROPHILS 4.9 1.7 - 8.2 K/UL    ABS. LYMPHOCYTES 1.4 0.5 - 4.6 K/UL    ABS. MONOCYTES 0.2 0.1 - 1.3 K/UL    ABS. EOSINOPHILS 0.1 0.0 - 0.8 K/UL    ABS. BASOPHILS 0.0 0.0 - 0.2 K/UL    ABS. IMM. GRANS. 0.0 0.0 - 0.5 K/UL   METABOLIC PANEL, COMPREHENSIVE    Collection Time: 09/16/19  2:46 PM   Result Value Ref Range    Sodium 131 (L) 136 - 145 mmol/L    Potassium 4.0 3.5 - 5.1 mmol/L    Chloride 105 98 - 107 mmol/L    CO2 23 21 - 32 mmol/L    Anion gap 3 (L) 7 - 16 mmol/L    Glucose 179 (H) 65 - 100 mg/dL    BUN 40 (H) 8 - 23 MG/DL    Creatinine 1.64 (H) 0.6 - 1.0 MG/DL    GFR est AA 38 (L) >60 ml/min/1.73m2    GFR est non-AA 31 (L) >60 ml/min/1.73m2    Calcium 11.1 (H) 8.3 - 10.4 MG/DL    Bilirubin, total 0.9 0.2 - 1.1 MG/DL    ALT (SGPT) 18 12 - 65 U/L    AST (SGOT) 35 15 - 37 U/L    Alk.  phosphatase 157 (H) 50 - 136 U/L    Protein, total 7.1 6.3 - 8.2 g/dL    Albumin 3.2 3.2 - 4.6 g/dL    Globulin 3.9 (H) 2.3 - 3.5 g/dL    A-G Ratio 0.8 (L) 1.2 - 3.5     LIPASE    Collection Time: 09/16/19  2:46 PM   Result Value Ref Range    Lipase 273 73 - 393 U/L   URINALYSIS W/ RFLX MICROSCOPIC    Collection Time: 09/16/19  2:46 PM   Result Value Ref Range    Color RED      Appearance CLOUDY      Specific gravity 1.024 (H) 1.001 - 1.023      pH (UA) 5.0 5.0 - 9.0      Protein 100 (A) NEG mg/dL    Glucose NEGATIVE  mg/dL    Ketone 15 (A) NEG mg/dL    Bilirubin LARGE (A) NEG      Blood NEGATIVE  NEG      Urobilinogen 1.0 0.2 - 1.0 EU/dL    Nitrites POSITIVE (A) NEG      Leukocyte Esterase MODERATE (A) NEG      WBC 5-10 0 /hpf    RBC 0-3 0 /hpf    Epithelial cells 0-3 0 /hpf    Bacteria 1+ (H) 0 /hpf    Casts HYALINE 0 /lpf    Amorphous Crystals 2+ (H) 0   CULTURE, URINE    Collection Time: 09/16/19  2:46 PM   Result Value Ref Range    Special Requests: NO SPECIAL REQUESTS      Culture result:        NO GROWTH AFTER SHORT PERIOD OF INCUBATION. FURTHER RESULTS TO FOLLOW AFTER OVERNIGHT INCUBATION.    POC LACTIC ACID    Collection Time: 09/16/19  3:23 PM   Result Value Ref Range    Lactic Acid (POC) 2.64 (H) 0.5 - 1.9 mmol/L   C. DIFFICILE AG & TOXIN A/B    Collection Time: 09/16/19  4:07 PM   Result Value Ref Range    GDH ANTIGEN C. DIFFICILE GDH ANTIGEN-NEGATIVE      C. difficile toxin C. DIFFICILE TOXIN-NEGATIVE      PCR Reflex NOT APPLICABLE      INTERPRETATION NEGATIVE FOR TOXIGENIC C. DIFFICILE NTXCD      Clinical Consideration NEGATIVE FOR TOXIGENIC C. DIFFICILE     CULTURE, BLOOD    Collection Time: 09/16/19  5:10 PM   Result Value Ref Range    Special Requests: LEFT  Antecubital        Culture result: NO GROWTH AFTER 14 HOURS     CULTURE, BLOOD    Collection Time: 09/16/19  5:10 PM   Result Value Ref Range    Special Requests: RIGHT  Antecubital        Culture result: NO GROWTH AFTER 14 HOURS     POC LACTIC ACID    Collection Time: 09/16/19  5:51 PM   Result Value Ref Range    Lactic Acid (POC) 2.64 (H) 0.5 - 1.9 mmol/L   LACTIC ACID    Collection Time: 09/16/19  8:39 PM   Result Value Ref Range    Lactic acid 3.1 (HH) 0.4 - 2.0 MMOL/L   METABOLIC PANEL, BASIC    Collection Time: 09/17/19 2:10 AM   Result Value Ref Range    Sodium 140 136 - 145 mmol/L    Potassium 4.3 3.5 - 5.1 mmol/L    Chloride 112 (H) 98 - 107 mmol/L    CO2 20 (L) 21 - 32 mmol/L    Anion gap 8 7 - 16 mmol/L    Glucose 101 (H) 65 - 100 mg/dL    BUN 45 (H) 8 - 23 MG/DL    Creatinine 1.61 (H) 0.6 - 1.0 MG/DL    GFR est AA 39 (L) >60 ml/min/1.73m2    GFR est non-AA 32 (L) >60 ml/min/1.73m2    Calcium 9.0 8.3 - 10.4 MG/DL   CBC WITH AUTOMATED DIFF    Collection Time: 09/17/19  2:10 AM   Result Value Ref Range    WBC 9.1 4.3 - 11.1 K/uL    RBC 3.66 (L) 4.05 - 5.2 M/uL    HGB 11.7 11.7 - 15.4 g/dL    HCT 36.5 35.8 - 46.3 %    MCV 99.7 (H) 79.6 - 97.8 FL    MCH 32.0 26.1 - 32.9 PG    MCHC 32.1 31.4 - 35.0 g/dL    RDW 13.4 11.9 - 14.6 %    PLATELET 806 161 - 623 K/uL    MPV 11.5 9.4 - 12.3 FL    ABSOLUTE NRBC 0.00 0.0 - 0.2 K/uL    NEUTROPHILS 87 (H) 43 - 78 %    LYMPHOCYTES 8 (L) 13 - 44 %    MONOCYTES 5 4.0 - 12.0 %    EOSINOPHILS 0 (L) 0.5 - 7.8 %    BASOPHILS 0 0.0 - 2.0 %    IMMATURE GRANULOCYTES 0 0.0 - 5.0 %    ABS. NEUTROPHILS 7.9 1.7 - 8.2 K/UL    ABS. LYMPHOCYTES 0.7 0.5 - 4.6 K/UL    ABS. MONOCYTES 0.5 0.1 - 1.3 K/UL    ABS. EOSINOPHILS 0.0 0.0 - 0.8 K/UL    ABS. BASOPHILS 0.0 0.0 - 0.2 K/UL    ABS. IMM.  GRANS. 0.0 0.0 - 0.5 K/UL    RBC COMMENTS NORMOCYTIC/NORMOCHROMIC      WBC COMMENTS Result Confirmed By Smear      PLATELET COMMENTS ADEQUATE      DF AUTOMATED     LACTIC ACID    Collection Time: 09/17/19  2:20 AM   Result Value Ref Range    Lactic acid 1.4 0.4 - 2.0 MMOL/L     Results     Procedure Component Value Units Date/Time    CULTURE, BLOOD [[id number]] Collected:  09/16/19 1710    Order Status:  Completed Specimen:  Blood Updated:  09/17/19 0727     Special Requests: --        LEFT  Antecubital       Culture result: NO GROWTH AFTER 14 HOURS       CULTURE, BLOOD [[id number]] Collected:  09/16/19 1710    Order Status:  Completed Specimen:  Blood Updated:  09/17/19 0727     Special Requests: --        RIGHT  Antecubital Culture result: NO GROWTH AFTER 14 HOURS       C. DIFFICILE AG & TOXIN A/B [[id number]] Collected:  09/16/19 1607    Order Status:  Completed Specimen:  Stool Updated:  09/17/19 1213     [address] [place] ANTIGEN       C. DIFFICILE GDH ANTIGEN-NEGATIVE           C. difficile toxin       C. DIFFICILE TOXIN-NEGATIVE           PCR Reflex NOT APPLICABLE        INTERPRETATION       NEGATIVE FOR TOXIGENIC C. DIFFICILE           Clinical Consideration       NEGATIVE FOR TOXIGENIC C. DIFFICILE          CULTURE, URINE [[id number]] Collected:  09/16/19 1446    Order Status:  Completed Specimen:  Urine from Clean catch Updated:  09/17/19 0751     Special Requests: NO SPECIAL REQUESTS        Culture result:       NO GROWTH AFTER SHORT PERIOD OF INCUBATION. FURTHER RESULTS TO FOLLOW AFTER OVERNIGHT INCUBATION. Imaging:   CT ABD PELV WO CONT   Final Result   IMPRESSION:  Mural thickening with stranding densities of the entire colon. This   could be ischemic or inflammatory. C. difficile should be considered. ASSESSMENT      Hospital Problems as of 9/17/2019 Never Reviewed          Codes Class Noted - Resolved POA    * (Principal) Colitis ICD-10-CM: K52.9  ICD-9-CM: 558.9  9/16/2019 - Present Unknown        UTI (urinary tract infection) ICD-10-CM: N39.0  ICD-9-CM: 599.0  9/16/2019 - Present Unknown        MONICA (acute kidney injury) ([facility] ICD-10-CM: N17.9  ICD-9-CM: 584.9  9/16/2019 - Present Unknown        HTN (hypertension) ICD-10-CM: I10  ICD-9-CM: 401.9  9/16/2019 - Present Unknown            Plan:  · Continue IV cipro/flagyl  · Advance diet to clear liquid  · Continue carvedilol; hold other anti-hypertensive meds  · Resume home dose norco  · Discontinue rectal tube when diarrhea improved    DVT Prophylaxis: SCDs    Signed By: Denisha Ortega MD     September 17, 2019

## 2019-09-17 NOTE — PROGRESS NOTES
Pt resting in bed and is alert and oriented x 2. She denies pain and is on 1 L NC. RR even and unlabored. IVF infusing. Rectal tube in place. Call light in reach and pt instructed to call for assistance if needed. Will monitor. Bed alarm on.

## 2019-09-17 NOTE — PROGRESS NOTES
Pt received to the floor. Admission assessment completed. PT is AAO x 4 with normal unlabored respirations present on RA. IV site is CDI. Rectal tube is in place. PT denies pain or other needs at this time. Family at bedside. Bed is low and locked with call light in reach, will continue to monitor.

## 2019-09-17 NOTE — PROGRESS NOTES
Care Management Interventions  PCP Verified by CM: Yes  Mode of Transport at Discharge: Other (see comment)  Transition of Care Consult (CM Consult): Other  Current Support Network: Lives Alone(7 days a week care givers)  Confirm Follow Up Transport: Family  Plan discussed with Pt/Family/Caregiver: Yes  Freedom of Choice Offered: Yes  Discharge Location  Discharge Placement: Home with family assistance([facility])  Visited with pt regarding plans for discharge, pt unable to provide information about d/c plans, called and spoke with son/Bossman #[phone]. States pt has [address] from 8a-130p and he goes over on sundays. Pt fell and broke her hip 3yrs ago and then broke the other one in Feb 2019, ws at [facility] x3wks and then went to [facility] for approx 10 weeks and then home, she has a rollator and walker and has been getting around with minimal assistance thus far. Plans to return home with care givers upon d/c. PT/PPD orders placed.

## 2019-09-18 ENCOUNTER — APPOINTMENT (OUTPATIENT)
Dept: GENERAL RADIOLOGY | Age: 84
DRG: 392 | End: 2019-09-18
Attending: INTERNAL MEDICINE
Payer: MEDICARE

## 2019-09-18 LAB
ANION GAP SERPL CALC-SCNC: 7 MMOL/L (ref 7–16)
ATRIAL RATE: 47 BPM
BUN SERPL-MCNC: 51 MG/DL (ref 8–23)
CALCIUM SERPL-MCNC: 8.8 MG/DL (ref 8.3–10.4)
CALCULATED R AXIS, ECG10: -53 DEGREES
CALCULATED T AXIS, ECG11: 145 DEGREES
CHLORIDE SERPL-SCNC: 106 MMOL/L (ref 98–107)
CO2 SERPL-SCNC: 20 MMOL/L (ref 21–32)
CREAT SERPL-MCNC: 1.62 MG/DL (ref 0.6–1)
DIAGNOSIS, 93000: NORMAL
GLUCOSE SERPL-MCNC: 97 MG/DL (ref 65–100)
POTASSIUM SERPL-SCNC: 3.8 MMOL/L (ref 3.5–5.1)
Q-T INTERVAL, ECG07: 294 MS
QRS DURATION, ECG06: 78 MS
QTC CALCULATION (BEZET), ECG08: 445 MS
SODIUM SERPL-SCNC: 133 MMOL/L (ref 136–145)
VENTRICULAR RATE, ECG03: 138 BPM

## 2019-09-18 PROCEDURE — 74011000258 HC RX REV CODE- 258: Performed by: INTERNAL MEDICINE

## 2019-09-18 PROCEDURE — 77010033678 HC OXYGEN DAILY

## 2019-09-18 PROCEDURE — 36415 COLL VENOUS BLD VENIPUNCTURE: CPT

## 2019-09-18 PROCEDURE — 65610000001 HC ROOM ICU GENERAL

## 2019-09-18 PROCEDURE — 74011250636 HC RX REV CODE- 250/636: Performed by: FAMILY MEDICINE

## 2019-09-18 PROCEDURE — 74011250637 HC RX REV CODE- 250/637: Performed by: FAMILY MEDICINE

## 2019-09-18 PROCEDURE — 74011250636 HC RX REV CODE- 250/636: Performed by: INTERNAL MEDICINE

## 2019-09-18 PROCEDURE — 94762 N-INVAS EAR/PLS OXIMTRY CONT: CPT

## 2019-09-18 PROCEDURE — 80048 BASIC METABOLIC PNL TOTAL CA: CPT

## 2019-09-18 PROCEDURE — 77030020263 HC SOL INJ SOD CL0.9% LFCR 1000ML

## 2019-09-18 PROCEDURE — 97161 PT EVAL LOW COMPLEX 20 MIN: CPT

## 2019-09-18 PROCEDURE — 94760 N-INVAS EAR/PLS OXIMETRY 1: CPT

## 2019-09-18 PROCEDURE — 71045 X-RAY EXAM CHEST 1 VIEW: CPT

## 2019-09-18 PROCEDURE — 74011250637 HC RX REV CODE- 250/637: Performed by: INTERNAL MEDICINE

## 2019-09-18 PROCEDURE — 97530 THERAPEUTIC ACTIVITIES: CPT

## 2019-09-18 PROCEDURE — 74011000250 HC RX REV CODE- 250: Performed by: INTERNAL MEDICINE

## 2019-09-18 PROCEDURE — 93005 ELECTROCARDIOGRAM TRACING: CPT | Performed by: INTERNAL MEDICINE

## 2019-09-18 RX ORDER — CIPROFLOXACIN 500 MG/1
500 TABLET ORAL EVERY 24 HOURS
Status: DISCONTINUED | OUTPATIENT
Start: 2019-09-18 | End: 2019-09-21

## 2019-09-18 RX ORDER — BUSPIRONE HYDROCHLORIDE 5 MG/1
5 TABLET ORAL 3 TIMES DAILY
Status: DISCONTINUED | OUTPATIENT
Start: 2019-09-18 | End: 2019-09-23 | Stop reason: HOSPADM

## 2019-09-18 RX ORDER — METOPROLOL SUCCINATE 25 MG/1
50 TABLET, EXTENDED RELEASE ORAL EVERY EVENING
Status: DISCONTINUED | OUTPATIENT
Start: 2019-09-18 | End: 2019-09-20

## 2019-09-18 RX ORDER — DILTIAZEM HYDROCHLORIDE 5 MG/ML
5 INJECTION INTRAVENOUS ONCE
Status: COMPLETED | OUTPATIENT
Start: 2019-09-18 | End: 2019-09-18

## 2019-09-18 RX ORDER — METRONIDAZOLE 500 MG/1
500 TABLET ORAL EVERY 12 HOURS
Status: DISCONTINUED | OUTPATIENT
Start: 2019-09-18 | End: 2019-09-23 | Stop reason: HOSPADM

## 2019-09-18 RX ADMIN — CIPROFLOXACIN HYDROCHLORIDE 500 MG: 500 TABLET, FILM COATED ORAL at 20:41

## 2019-09-18 RX ADMIN — SODIUM CHLORIDE 75 ML/HR: 900 INJECTION, SOLUTION INTRAVENOUS at 03:01

## 2019-09-18 RX ADMIN — Medication 10 ML: at 23:56

## 2019-09-18 RX ADMIN — BUSPIRONE HYDROCHLORIDE 5 MG: 5 TABLET ORAL at 22:00

## 2019-09-18 RX ADMIN — HEPARIN SODIUM 5000 UNITS: 5000 INJECTION INTRAVENOUS; SUBCUTANEOUS at 00:14

## 2019-09-18 RX ADMIN — HEPARIN SODIUM 5000 UNITS: 5000 INJECTION INTRAVENOUS; SUBCUTANEOUS at 23:55

## 2019-09-18 RX ADMIN — SODIUM CHLORIDE 2.5 MG/HR: 900 INJECTION, SOLUTION INTRAVENOUS at 18:35

## 2019-09-18 RX ADMIN — SODIUM CHLORIDE 150 ML/HR: 900 INJECTION, SOLUTION INTRAVENOUS at 17:48

## 2019-09-18 RX ADMIN — BUSPIRONE HYDROCHLORIDE 5 MG: 5 TABLET ORAL at 20:40

## 2019-09-18 RX ADMIN — METRONIDAZOLE 500 MG: 500 INJECTION, SOLUTION INTRAVENOUS at 09:58

## 2019-09-18 RX ADMIN — Medication 10 ML: at 21:42

## 2019-09-18 RX ADMIN — DILTIAZEM HYDROCHLORIDE 5 MG: 5 INJECTION INTRAVENOUS at 17:13

## 2019-09-18 RX ADMIN — VANCOMYCIN HYDROCHLORIDE 125 MG: 1 INJECTION, POWDER, LYOPHILIZED, FOR SOLUTION INTRAVENOUS at 10:03

## 2019-09-18 RX ADMIN — METRONIDAZOLE 500 MG: 500 TABLET, FILM COATED ORAL at 20:40

## 2019-09-18 RX ADMIN — SODIUM CHLORIDE 75 ML/HR: 900 INJECTION, SOLUTION INTRAVENOUS at 14:11

## 2019-09-18 RX ADMIN — CARVEDILOL 3.12 MG: 6.25 TABLET, FILM COATED ORAL at 09:58

## 2019-09-18 RX ADMIN — METRONIDAZOLE 500 MG: 500 INJECTION, SOLUTION INTRAVENOUS at 03:01

## 2019-09-18 RX ADMIN — VANCOMYCIN HYDROCHLORIDE 125 MG: 1 INJECTION, POWDER, LYOPHILIZED, FOR SOLUTION INTRAVENOUS at 03:01

## 2019-09-18 RX ADMIN — HEPARIN SODIUM 5000 UNITS: 5000 INJECTION INTRAVENOUS; SUBCUTANEOUS at 09:58

## 2019-09-18 RX ADMIN — METOPROLOL SUCCINATE 50 MG: 25 TABLET, EXTENDED RELEASE ORAL at 20:41

## 2019-09-18 NOTE — PROGRESS NOTES
[facility]. PM assessment completed. PT is AAO to self with normal unlabored respirations present on 1L O2. IV site is CDI and infusing. Pt denies pain or other needs at this time. Bed is low and locked with call light in reach and alarm set. Will continue to monitor.

## 2019-09-18 NOTE — PROGRESS NOTES
Problem: Mobility Impaired (Adult and Pediatric)  Goal: *Acute Goals and Plan of Care (Insert Text)  Description  DISCHARGE GOALS:  (1.)Ms. Bulmaro Mccoy will move from supine to sit and sit to supine  in bed with SUPERVISION within 5 treatment day(s). (2.)Ms. Bulmaro Mccoy will transfer from bed to chair and chair to bed with SUPERVISION using the least restrictive device within 5 treatment day(s). (3.)Ms. Montgomery will ambulate with SUPERVISION for 50 feet with the least restrictive device within 5 treatment day(s). (4.)Ms. Montgomery will ambulate up/down 1 step with a hand hold assist within 5 treatment days. ________________________________________________________________________________________________     Outcome: Progressing Towards Goal     PHYSICAL THERAPY: Initial Assessment and AM 9/18/2019  INPATIENT: PT Visit Days : 1  [place] / [place] / Product Type: Medicare /       NAME/AGE/GENDER: Vineet Serrano is a 80 y.o. female   PRIMARY DIAGNOSIS: Colitis [K52.9]  UTI (urinary tract infection) [N39.0]  MONICA (acute kidney [facility]) ([facility] [N17.9] Colitis   Colitis          ICD-10: Treatment Diagnosis:    Generalized Muscle Weakness (M62.81)  Difficulty in walking, Not elsewhere classified (R26.2)   Precaution/Allergies:  Patient has no known allergies. ASSESSMENT:     Ms. Bulmaro Mccoy presents with above diagnosis. She demonstrates generalized weakness affecting mobility and tolerance for activity. Patient ambulates with a walker in the home (rollator in the main house, RW in the bedroom) and is supervised with mobility/transfers. Patient does get some assist from home care staff for ADLs. Patient would benefit from therapy to facilitate a return to prior level. Expect patient to return home at d/c with home health therapy.     This section established at most recent assessment   PROBLEM LIST (Impairments causing functional limitations):  Decreased Strength  Decreased ADL/Functional Activities  Decreased Transfer Abilities  Decreased Ambulation Ability/Technique  Decreased Balance  Decreased Activity Tolerance   INTERVENTIONS PLANNED: (Benefits and precautions of physical therapy have been discussed with the patient.)  Balance Exercise  Bed Mobility  Family Education  Gait Training  Home Exercise Program (HEP)  Therapeutic Activites  Therapeutic Exercise/Strengthening     TREATMENT PLAN: Frequency/Duration: daily for duration of hospital stay  Rehabilitation Potential For Stated Goals: Good     REHAB RECOMMENDATIONS (at time of discharge pending progress):    Placement: It is my opinion, based on this patient's performance to date, that Ms. Montgomery may benefit from [address] after discharge due to the functional deficits listed above that are likely to improve with skilled rehabilitation because he/she has an inability to tolerate transportation to an outpatient setting as evidenced by increased weakness and needed assist for mobility . Equipment:   None at this time              HISTORY:   History of Present Injury/Illness (Reason for Referral):  Pt not a good historian. No family at bedside . Later on after admission was able to speak to son-Ulises,Kourtney and daughter    in law-  According to them pt lives alone, has care giver who comes half a day(care giver had viral illness in Thursday- was off for few days and came back to work today). Pt has diagnosed with afib after her hip surgery in feb- presently not on any medications- says pt is on coreg and metoprolol(? Why). Also on medications for depression and htn. Chronic pain medications for hip pain. PCP has been changing medications for bp as it was uncontrolled. Pt was ok this am- started having diarrhea,vomiting and abdominal pain- hence care giver called 911. Pt main c/o was abdominal pain at the time of my examination- says pain medications didn't help. Presently has rectal tube.      Pt otherwise didn't c/o headache or dizziness or chest pain or sob or rectal bleeding or hemoptysis. ua nitrite positive  Creatinine  1.64,gfr 31,lactic acid 2.64  C.diff pending  Ct abd pelvis with out contrast-  Mural thickening with stranding densities of the entire colon. This  could be ischemic or inflammatory. C. difficile should be considered. Pt will be admitted for colitis ischemic vs inflammatory. Past Medical History/Comorbidities:   Ms. Joanie Gonzalez  has a past medical history of ADHD, Arthritis, and Hypertension. Ms. Joanie Gonzalez  has no past surgical history on file. Social History/Living Environment:   Home Environment: Private residence  Wheelchair Ramp: Yes  One/Two Story Residence: One story(with 1 step in/out of bedroom)  Living Alone: Yes  Support Systems: Child(nura), Home care staff  Patient Expects to be Discharged to[de-identified] Private residence  Current DME Used/Available at Home: Commode, bedside, Grab bars, Shower chair, Walker, rollator, Walker, rolling  Tub or Shower Type: Shower  Prior Level of Function/Work/Activity:  Ambulating with walker. Number of Personal Factors/Comorbidities that affect the Plan of Care: 1-2: MODERATE COMPLEXITY   EXAMINATION:   Most Recent Physical Functioning:   Gross Assessment:  AROM: Generally decreased, functional  Strength: Generally decreased, functional  Coordination: Generally decreased, functional  Tone: Normal               Posture:  Posture (WDL): Exceptions to WDL  Posture Assessment:  Forward head, Kyphosis, Rounded shoulders  Balance:  Sitting: Intact  Standing: With support Bed Mobility:  Supine to Sit: Minimum assistance  Scooting: Minimum assistance  Wheelchair Mobility:     Transfers:  Sit to Stand: Minimum assistance  Stand to Sit: Minimum assistance  Bed to Chair: Minimum assistance  Gait:     Base of Support: Center of gravity altered  Speed/Romina: Slow  Step Length: Left shortened;Right shortened  Gait Abnormalities: Decreased step clearance  Distance (ft): 10 Feet (ft)(x 2)  Assistive Device: Walker, rolling  Ambulation - Level of Assistance: Minimal assistance  Interventions: Safety awareness training;Verbal cues      Body Structures Involved:  Muscles Body Functions Affected: Movement Related Activities and Participation Affected: Mobility   Number of elements that affect the Plan of Care: 3: MODERATE COMPLEXITY   CLINICAL PRESENTATION:   Presentation: Stable and uncomplicated: LOW COMPLEXITY   CLINICAL DECISION MAKIN[address] [place] [address] AM-PAC 6 Clicks   Basic Mobility Inpatient Short Form  How much difficulty does the patient currently have. .. Unable A Lot A Little None   1. Turning over in bed (including adjusting bedclothes, sheets and blankets)? ? 1   ? 2   ? 3   ? 4   2. Sitting down on and standing up from a chair with arms ( e.g., wheelchair, bedside commode, etc.)   ? 1   ? 2   ? 3   ? 4   3. Moving from lying on back to sitting on the side of the bed?   ? 1   ? 2   ? 3   ? 4   How much help from another person does the patient currently need. .. Total A Lot A Little None   4. Moving to and from a bed to a chair (including a wheelchair)? ? 1   ? 2   ? 3   ? 4   5. Need to walk in hospital room? ? 1   ? 2   ? 3   ? 4   6. Climbing 3-5 steps with a railing? ? 1   ? 2   ? 3   ? 4   © , Trustees of [address] [place] [address], under license to [employer]. All rights reserved      Score:  Initial: 17 Most Recent: X (Date: -- )    Interpretation of Tool:  Represents activities that are increasingly more difficult (i.e. Bed mobility, Transfers, Gait). Medical Necessity:     Patient is expected to demonstrate progress in strength, balance and coordination   to decrease assistance required with bed mobility, transfers, and gait. .  Reason for Services/Other Comments:  Patient continues to require skilled intervention due to weakness affecting bed mobility, transfers, and gait.    .   Use of outcome tool(s) and clinical judgement create a POC that gives a: Clear prediction of patient's progress: LOW COMPLEXITY            TREATMENT:   (In addition to Assessment/Re-Assessment sessions the following treatments were rendered)   Pre-treatment Symptoms/Complaints:  Patient agreeable to working with therapy. Pain: Initial:   Pain Intensity 1: 0  Post Session:  0     Therapeutic Activity: (    40 minutes): Therapeutic activities including tub transfer with bathing and dressing  to improve mobility, strength, balance and coordination. Required minimal Safety awareness training;Verbal cues to promote static and dynamic balance in standing and sitting. Braces/Orthotics/Lines/Etc:   O2 Device: Room air-back on 2L at end of session with sats 88% up to 94% but fluctuating. Treatment/Session Assessment:    Response to Treatment:  Patient participated well. Activity tolerance limited. Interdisciplinary Collaboration:   Physical Therapist  Registered Nurse  Certified Nursing Assistant/Patient Care Technician  After treatment position/precautions:   Up in chair  Bed/Chair-wheels locked  Caregiver at bedside  Call light within reach   Compliance with Program/Exercises: Will assess as treatment progresses  Recommendations/Intent for next treatment session: \"Next visit will focus on reduction in assistance provided\".   Total Treatment Duration:  PT Patient Time In/Time Out  Time In: 1040  Time Out: Estuardo Regalado

## 2019-09-18 NOTE — PROGRESS NOTES
Hospitalist Progress Note    2019  Admit Date: 2019 12:57 PM   NAME: Katja Jackson   :  1930   MRN:  [id number]   Attending: Annie Lo MD  PCP:  Debbi Moya MD    SUBJECTIVE:   Radames Cortes is an 79 yo F admitted  with pan-colitis. Seen with her DIL and caregiver at bedside. She is unable to provide reliable history due to dementia. Her caregiver states she seems to be in less pain today and is not yelling in pain as much. - seen with Frederic Garcia, caregiver, at bedside. Ms. Bharat Owusu complains of slight nausea. Denies abdominal pain. Per RN and her caregiver, stool output has slowed. Review of Systems negative with exception of pertinent positives noted above  PHYSICAL EXAM     Visit Vitals  /53 (BP 1 Location: Right arm, BP Patient Position: At rest)   Pulse 89   Temp 98.6 °F (37 °C)   Resp 20   Ht 5' 4\" (1.626 m)   Wt 55.3 kg (122 lb)   SpO2 99%   BMI 20.94 kg/m²      Temp (24hrs), Av.8 °F (37.1 °C), Min:98.3 °F (36.8 °C), Max:99.9 °F (37.7 °C)    Patient Vitals for the past 24 hrs:   Temp Pulse Resp BP SpO2   19 0825 98.6 °F (37 °C) 89 20 148/53 99 %   19 0358 98.5 °F (36.9 °C) 98 18 139/56 95 %   19 0014 98.6 °F (37 °C) 89 16 140/54 94 %   19 1907 98.9 °F (37.2 °C) 79 18 102/51 95 %   19 1654 99.9 °F (37.7 °C) 78 16 137/74 96 %   19 1213 98.3 °F (36.8 °C) 72 20 117/75 94 %       Oxygen Therapy  O2 Sat (%): 99 % (19 0825)  Pulse via Oximetry: 103 beats per minute (19 1908)  O2 Device: Room air (19 1300)  No intake or output data in the 24 hours ending 19 1035   General: No acute distress, elderly  Lungs:  CTA Bilaterally.    Heart:  Regular rate and rhythm,  No murmur, rub, or gallop  Abdomen: Soft, no tenderness, + bowel sounds, rectal tube in place  Extremities: No cyanosis, clubbing or edema  Neurologic:  No focal deficits    Recent Results (from the past 24 hour(s))   METABOLIC PANEL, BASIC    Collection Time: 09/18/19  5:02 AM   Result Value Ref Range    Sodium 133 (L) 136 - 145 mmol/L    Potassium 3.8 3.5 - 5.1 mmol/L    Chloride 106 98 - 107 mmol/L    CO2 20 (L) 21 - 32 mmol/L    Anion gap 7 7 - 16 mmol/L    Glucose 97 65 - 100 mg/dL    BUN 51 (H) 8 - 23 MG/DL    Creatinine 1.62 (H) 0.6 - 1.0 MG/DL    GFR est AA 38 (L) >60 ml/min/1.73m2    GFR est non-AA 32 (L) >60 ml/min/1.73m2    Calcium 8.8 8.3 - 10.4 MG/DL     Results     Procedure Component Value Units Date/Time    CULTURE, BLOOD [[id number]] Collected:  09/16/19 1710    Order Status:  Completed Specimen:  Blood Updated:  09/18/19 0704     Special Requests: --        LEFT  Antecubital       Culture result: NO GROWTH 2 DAYS       CULTURE, BLOOD [[id number]] Collected:  09/16/19 1710    Order Status:  Completed Specimen:  Blood Updated:  09/18/19 0704     Special Requests: --        RIGHT  Antecubital       Culture result: NO GROWTH 2 DAYS       C. DIFFICILE AG & TOXIN A/B [[id number]] Collected:  09/16/19 1607    Order Status:  Completed Specimen:  Stool Updated:  09/17/19 1213     [address] ANTIGEN       C. DIFFICILE GDH ANTIGEN-NEGATIVE           C. difficile toxin       C. DIFFICILE TOXIN-NEGATIVE           PCR Reflex NOT APPLICABLE        INTERPRETATION       NEGATIVE FOR TOXIGENIC C. DIFFICILE           Clinical Consideration       NEGATIVE FOR TOXIGENIC C. DIFFICILE          CULTURE, URINE [[id number]] Collected:  09/16/19 1446    Order Status:  Completed Specimen:  Urine from Clean catch Updated:  09/18/19 0755     Special Requests: NO SPECIAL REQUESTS        Culture result:       <10,000  COLONIES/mL  NORMAL SKIN MARY ISOLATED          Imaging:   CT ABD PELV WO CONT   Final Result   IMPRESSION:  Mural thickening with stranding densities of the entire colon. This   could be ischemic or inflammatory. C. difficile should be considered.                ASSESSMENT      Hospital Problems as of 9/18/2019 Never Reviewed          Codes Class Noted - Resolved POA    * (Principal) Colitis ICD-10-CM: K52.9  ICD-9-CM: 558.9  9/16/2019 - Present Unknown        UTI (urinary tract infection) ICD-10-CM: N39.0  ICD-9-CM: 599.0  9/16/2019 - Present Unknown        MONICA (acute kidney injury) ([facility] [address] ICD-10-CM: N17.9  ICD-9-CM: 584.9  9/16/2019 - Present Unknown        HTN (hypertension) ICD-10-CM: I10  ICD-9-CM: 401.9  9/16/2019 - Present Unknown            Plan:  · Continue IV cipro/flagyl  · Advance diet to full liquid  · Stop PO vancomycin as C diff negative. · Continue IVF. · Continue carvedilol; hold other anti-hypertensive meds  · Continue home dose norco  · Discontinue rectal tube as diarrhea slowed  · Consult GI for further recs and to arrange outpatient follow up    DVT Prophylaxis: SCDs    Signed By: Cordell Garcia.  Niya Dozier MD     September 18, 2019

## 2019-09-18 NOTE — PROGRESS NOTES
Called to bedside due to audible 'wheezing.'  Evaluated her. She was in no distress. Lungs clear but expiratory wheeze noted in throat. When she was talking or distracted, sound was not present. No inspiratory stridor. She has a very weak cough. Family and caregiver at bedside deny she has had any choking. Will monitor. If respiratory status worsens, will check CXR and consider CT neck.     Unique Becker MD

## 2019-09-18 NOTE — PROGRESS NOTES
Hospitalist Note    Called due to patient's heart rate jumping to 170s. Initially monitor room stated SVT, then converted to a fib. On evaluation, patient denies chest pain or palpitations, but she feels 'nervous.'    Exam:  Gen- no distress  CV- irregular rhythm, tachycardic with rate of ~120s  Lungs- lungs clear, but still with occasional upper airway expiration squeak or wheeze    EKG with a fib and heart rate at 138. A/P) A fib with RVR    Transfer to ICU  Cardizem bolus and gtt  CXR STAT  Will change carvedilol to Toprol (1st dose this evening) to get longer acting beta blocker control for a fib. Per GI recs, will transition to PO antibiotics and start low residue diet.     Unique Becker MD

## 2019-09-18 NOTE — PROGRESS NOTES
Pt resting in bed and is alert and oriented to self. She denies pain and is on 2 L NC. RR even and unlabored. Rectal tube in place. IVF infusing. Call light in reach and pt instructed to call for assistance if needed. Will monitor. Bed alarm on.

## 2019-09-18 NOTE — PROGRESS NOTES
I updated her son and DIL at bedside. Heart rate improved with bolus of cardizem. CXR clear. They state that she has significant anxiety problem and feel this may have contributed to the upper airway wheeze. Will start Buspar.     Kehinde Senior MD

## 2019-09-18 NOTE — CONSULTS
[facility] Consult Note       Primary GI Physician: [place] - Dr. Jazmine Carrion    Referring Provider:  Dr. Regina Ray Date:  9/18/2019    Admit Date:  9/16/2019    Chief Complaint:  Pan-colitis, will need close follow up    Subjective:     History of Present Illness:  Patient is a 80 y.o. female with PMH of dementia, A fib, HTN, depression, ADHD, arthritis, CKD, who is seen in consultation at the request of Dr. Galilea Bullard for pan-colitis, will need close follow up. Pt is unable to provide the history due to dementia and history if obtained from the RN, chart. She was admitted after presenting to the ER with acute onset of abd pain, nausea, vomiting, and diarrhea, and was noted to have pancolitis on CT. She has been improving over this admission on Vanc - held after C diff negative, and Cipro and Flagyl, and received a dose of Rocephin the first day. RN states rectal tube was removed this morning and pt has not had a BM since then. Pt denies any abd pain, vomiting, bleeding, heartburn, chest pain, or difficulty swallowing. She states she has had nausea at times and some wheezing. PMH:  Past Medical History:   Diagnosis Date    ADHD     Arthritis     Hypertension      Dementia  CKD  A fib  Depression  Iron def anemia  Spinal compression fracture  GERD  Vit D def  Urinary incontinence    Per note from her PCP in Care Everywhere - pt has declined colonoscopies. PSH:  Hip fracture - right Feb 2019  Hip surgery - left Aug 2015  Hip surgery - right March 2019    Allergies:  No Known Allergies    Home Medications:  Prior to Admission medications    Medication Sig Start Date End Date Taking? Authorizing Provider   calcium-cholecalciferol, d3, (CALCIUM 600 + D) 600-125 mg-unit tab Take 1 Tab by mouth two (2) times a day. Yes Provider, Historical   HYDROcodone-acetaminophen (NORCO) 5-325 mg per tablet Take 1 Tab by mouth daily.    Yes Provider, Historical   amLODIPine (NORVASC) 5 mg tablet Take 5 mg by mouth daily. Yes Provider, Historical   benazepril-hydroCHLOROthiazide (LOTENSIN HCT) 10-12.5 mg per tablet Take 1 Tab by mouth daily. Yes Provider, Historical   Venlafaxine 150 mg tr24 Take 150 mg by mouth. Yes Provider, Historical   mirtazapine (REMERON) 15 mg tablet Take 15 mg by mouth nightly. Yes Provider, Historical   carvedilol (COREG) 3.125 mg tablet Take 3.125 mg by mouth two (2) times daily (with meals). Yes Other, MD Kena   folic acid (FOLVITE) 1 mg tablet Take 1 mg by mouth. Yes Other, MD Kena       Hospital Medications:  Current Facility-Administered Medications   Medication Dose Route Frequency    HYDROcodone-acetaminophen (NORCO) 5-325 mg per tablet 1 Tab  1 Tab Oral Q6H PRN    carvedilol (COREG) tablet 3.125 mg  3.125 mg Oral BID WITH MEALS    sodium chloride (NS) flush 5-40 mL  5-40 mL IntraVENous Q8H    sodium chloride (NS) flush 5-40 mL  5-40 mL IntraVENous PRN    tuberculin injection 5 Units  5 Units IntraDERMal ONCE    morphine injection 1 mg  1 mg IntraVENous Q4H PRN    heparin (porcine) injection 5,000 Units  5,000 Units SubCUTAneous Q8H    naloxone (NARCAN) injection 0.4 mg  0.4 mg IntraVENous PRN    diphenhydrAMINE (BENADRYL) injection 12.5 mg  12.5 mg IntraVENous Q4H PRN    ondansetron (ZOFRAN) injection 4 mg  4 mg IntraVENous Q4H PRN    sodium chloride 0.9 % bolus infusion  150 mL/hr IntraVENous CONTINUOUS    ciprofloxacin (CIPRO) 400 mg in D5W IVPB (premix)  400 mg IntraVENous Q24H    metroNIDAZOLE (FLAGYL) IVPB premix 500 mg  500 mg IntraVENous Q8H    0.9% sodium chloride infusion  75 mL/hr IntraVENous CONTINUOUS       Social History:  Social History     Tobacco Use    Smoking status: Never Smoker    Smokeless tobacco: Never Used   Substance Use Topics    Alcohol use: No     Frequency: Never       Pt had children. Family History: Mother had uterine cancer. Father had CAD.     Review of Systems:  A detailed 10 system ROS is obtained, with pertinent positives as listed above. All others are negative - but limited given confusion. Diet:  Clear liquids    Objective:     Physical Exam:  Vitals:  Visit Vitals  /62 (BP 1 Location: Right arm, BP Patient Position: Sitting)   Pulse 92   Temp 98.7 °F (37.1 °C)   Resp 18   Ht 5' 4\" (1.626 m)   Wt 55.3 kg (122 lb)   SpO2 97%   BMI 20.94 kg/m²     Gen:  Pt is alert, cooperative, no acute distress, lying in bed, confused at times  Skin:  Extremities and face reveal no rashes. HEENT: Sclerae anicteric. Extra-occular muscles are intact. No oral ulcers. No abnormal pigmentation of the lips. The neck is supple. Cardiovascular: Regular rate and rhythm. No murmurs, gallops, or rubs. Respiratory:  Comfortable breathing with accessory muscle use. Coarse breath sounds anteriorly. GI:  Abdomen nondistended, soft, and nontender. Normal active bowel sounds. No enlargement of the liver or spleen. No masses palpable. Rectal:  Deferred  Musculoskeletal:  No pitting edema of the lower legs. Neurological:  Patient is alert and oriented to person and place and Sept 2019, but is confused at times during conversation. Lymphatic:  No cervical or supraclavicular adenopathy. Laboratory:    Recent Labs     09/18/19  0502 09/17/19  0210 09/16/19  1446   WBC  --  9.1 6.6   HGB  --  11.7 14.2   HCT  --  36.5 43.7   PLT  --  150 230   MCV  --  99.7* 98.2*   * 140 131*   K 3.8 4.3 4.0    112* 105   CO2 20* 20* 23   BUN 51* 45* 40*   CREA 1.62* 1.61* 1.64*   CA 8.8 9.0 11.1*   GLU 97 101* 179*   AP  --   --  157*   SGOT  --   --  35   ALT  --   --  18   TBILI  --   --  0.9   ALB  --   --  3.2   TP  --   --  7.1   LPSE  --   --  273      Ref.  Range 9/16/2019 20:39 9/17/2019 02:10 9/17/2019 02:20   Lactic acid Latest Ref Range: 0.4 - 2.0 MMOL/L 3.1 (HH)  1.4     C. DIFFICILE AG & TOXIN A/B [[id number]] (Order [id number]) Microbiology   Date: 9/16/2019 Department: [facility] Med Surg Released By/Authorizing: Leatha Sage, Perlita Powell MD (auto-released)   Component Value Flag Ref Range Units Status   GDH ANTIGEN      Final   C. DIFFICILE GDH ANTIGEN-NEGATIVE    C. difficile toxin      Final   C. DIFFICILE TOXIN-NEGATIVE    PCR Reflex NOT APPLICABLE      Final   INTERPRETATION   NTXCD   Final   NEGATIVE FOR TOXIGENIC C. DIFFICILE    Clinical Consideration      Final   NEGATIVE FOR TOXIGENIC C. DIFFICILE      CT ABDOMEN AND PELVIS WITH CONTRAST. (?without contrast) 16 Sept 2019  HISTORY: Sudden onset abdominal pain.   COMPARISON: None   TECHNIQUE: 5 mm axial scans from above the diaphragms to the pubic symphysis. Patient vomited the oral contrast. Radiation dose reduction techniques were used  for this study. Our CT scanners use one or more of the following:  Automated  exposure control, adjustment of the mA and or kV according to patient size,  iterative reconstruction.   FINDINGS:   Abdomen: The lung bases are clear. No gross focal parenchymal abnormality  identified within the liver or spleen. There are small calcified gallstones. The  biliary tree is not dilated. The pancreas is unremarkable.   There is mural thickening of the entire colon. There are stranding densities in  the small amount of fluid. No free air. There are The kidneys are normal size. Stones in the left kidney and left renal atrophy. The adrenal glands are normal  size. Aorta is normal caliber and calcified. There is scoliosis.   Pelvis: Bilateral hip surgery obscures detail. No free air. The urinary bladder  unremarkable.   IMPRESSION  IMPRESSION:  Mural thickening with stranding densities of the entire colon. This  could be ischemic or inflammatory. C. difficile should be considered.     Assessment:     Principal Problem:    Colitis (9/16/2019)    Active Problems:    UTI (urinary tract infection) (9/16/2019)      MONICA (acute kidney injury) ([facility] [address] (9/16/2019)      HTN (hypertension) (9/16/2019)    79 yo female with PMH of dementia, A fib, HTN, depression, ADHD, arthritis, iron def anemia, who is seen in consultation 18 Sept 2019 at the request of Dr. Eligio Dunn for pan-colitis, will need close follow up, who was admitted after presenting with abd pain, nausea, vomiting, and diarrhea, and was noted to have pancolitis - ischemia or infectious. C diff was negative,but symptoms could be related to other infection. She has also been noted to have UTI. She has had improvement with Cipro and Flagyl (was on Vanc - held after C diff negative, and received 1 dose of Rocephin initially). Per note from her PCP, she has declined colonoscopies. Plan:     -Supportive care. -Transition to po Cipro and Flagyl and complete course.  -Low residue diet.  -Follow up in our office in 3-4 weeks. Our office will contact pt to make the appt. Patient is seen and examined in collaboration with Dr. Abran Zuñiga. Assessment and plan as per Dr. Stefan Bennett. Peggy Noe PA-C  [facility]    I have seen and examined the patient, and I have directed and agreed to the plan as described. This appears to be infectious colitis. It is improving. It is unlikely to cause long-term problems. We will arrange follow up in the office. We will sign off at this time.     Abran Zuñiga MD

## 2019-09-18 NOTE — PROGRESS NOTES
Visited with pt regarding plans for discharge, therapy is recommending [facility] PT services upon d/c. Pt states that she has used [facility] in the past and would like them again. Will fax Info upon d/c.

## 2019-09-18 NOTE — PROGRESS NOTES
Monitor room called and HR got up to 170s and converted to A Fib. HR jumped from 170s -99 uncontrolled. bp is 177/97. Patient denies chest pain, she claims only to have nausea. She is wheezing on and off and oxygen sat is 99% on 2 L NC. MD notified and order received for stat ekg and stat chest x ray. MD assessing patient.

## 2019-09-19 PROBLEM — F41.8 DEPRESSION WITH ANXIETY: Status: ACTIVE | Noted: 2019-09-19

## 2019-09-19 PROBLEM — I48.0 PAROXYSMAL ATRIAL FIBRILLATION (HCC): Status: ACTIVE | Noted: 2019-09-19

## 2019-09-19 PROBLEM — I48.91 ATRIAL FIBRILLATION WITH RVR (HCC): Status: ACTIVE | Noted: 2019-09-19

## 2019-09-19 LAB
ALBUMIN SERPL-MCNC: 2.4 G/DL (ref 3.2–4.6)
ALBUMIN/GLOB SERPL: 0.7 {RATIO} (ref 1.2–3.5)
ALP SERPL-CCNC: 63 U/L (ref 50–136)
ALT SERPL-CCNC: 16 U/L (ref 12–65)
ANION GAP SERPL CALC-SCNC: 5 MMOL/L (ref 7–16)
AST SERPL-CCNC: 26 U/L (ref 15–37)
BACTERIA SPEC CULT: NORMAL
BASOPHILS # BLD: 0 K/UL (ref 0–0.2)
BASOPHILS NFR BLD: 0 % (ref 0–2)
BILIRUB SERPL-MCNC: 0.4 MG/DL (ref 0.2–1.1)
BUN SERPL-MCNC: 33 MG/DL (ref 8–23)
CALCIUM SERPL-MCNC: 9.4 MG/DL (ref 8.3–10.4)
CHLORIDE SERPL-SCNC: 110 MMOL/L (ref 98–107)
CO2 SERPL-SCNC: 21 MMOL/L (ref 21–32)
CREAT SERPL-MCNC: 1.09 MG/DL (ref 0.6–1)
DIFFERENTIAL METHOD BLD: ABNORMAL
EOSINOPHIL # BLD: 0.1 K/UL (ref 0–0.8)
EOSINOPHIL NFR BLD: 1 % (ref 0.5–7.8)
ERYTHROCYTE [DISTWIDTH] IN BLOOD BY AUTOMATED COUNT: 13.4 % (ref 11.9–14.6)
GLOBULIN SER CALC-MCNC: 3.4 G/DL (ref 2.3–3.5)
GLUCOSE SERPL-MCNC: 94 MG/DL (ref 65–100)
HCT VFR BLD AUTO: 31.1 % (ref 35.8–46.3)
HGB BLD-MCNC: 10 G/DL (ref 11.7–15.4)
IMM GRANULOCYTES # BLD AUTO: 0.1 K/UL (ref 0–0.5)
IMM GRANULOCYTES NFR BLD AUTO: 1 % (ref 0–5)
LYMPHOCYTES # BLD: 0.7 K/UL (ref 0.5–4.6)
LYMPHOCYTES NFR BLD: 13 % (ref 13–44)
MCH RBC QN AUTO: 31.9 PG (ref 26.1–32.9)
MCHC RBC AUTO-ENTMCNC: 32.2 G/DL (ref 31.4–35)
MCV RBC AUTO: 99.4 FL (ref 79.6–97.8)
MM INDURATION POC: 0 MM (ref 0–5)
MONOCYTES # BLD: 0.3 K/UL (ref 0.1–1.3)
MONOCYTES NFR BLD: 6 % (ref 4–12)
NEUTS SEG # BLD: 4.2 K/UL (ref 1.7–8.2)
NEUTS SEG NFR BLD: 79 % (ref 43–78)
NRBC # BLD: 0 K/UL (ref 0–0.2)
PLATELET # BLD AUTO: 156 K/UL (ref 150–450)
PLATELET COMMENTS,PCOM: ADEQUATE
PMV BLD AUTO: 10.5 FL (ref 9.4–12.3)
POTASSIUM SERPL-SCNC: 3.3 MMOL/L (ref 3.5–5.1)
PPD POC: NEGATIVE NEGATIVE
PROT SERPL-MCNC: 5.8 G/DL (ref 6.3–8.2)
RBC # BLD AUTO: 3.13 M/UL (ref 4.05–5.2)
RBC MORPH BLD: ABNORMAL
SERVICE CMNT-IMP: NORMAL
SODIUM SERPL-SCNC: 136 MMOL/L (ref 136–145)
WBC # BLD AUTO: 5.4 K/UL (ref 4.3–11.1)
WBC MORPH BLD: ABNORMAL

## 2019-09-19 PROCEDURE — 85025 COMPLETE CBC W/AUTO DIFF WBC: CPT

## 2019-09-19 PROCEDURE — 65610000001 HC ROOM ICU GENERAL

## 2019-09-19 PROCEDURE — 97110 THERAPEUTIC EXERCISES: CPT

## 2019-09-19 PROCEDURE — 74011250637 HC RX REV CODE- 250/637: Performed by: HOSPITALIST

## 2019-09-19 PROCEDURE — 94762 N-INVAS EAR/PLS OXIMTRY CONT: CPT

## 2019-09-19 PROCEDURE — 74011250636 HC RX REV CODE- 250/636: Performed by: FAMILY MEDICINE

## 2019-09-19 PROCEDURE — 80053 COMPREHEN METABOLIC PANEL: CPT

## 2019-09-19 PROCEDURE — 77010033678 HC OXYGEN DAILY

## 2019-09-19 PROCEDURE — 74011250637 HC RX REV CODE- 250/637: Performed by: INTERNAL MEDICINE

## 2019-09-19 RX ORDER — LOPERAMIDE HYDROCHLORIDE 2 MG/1
2 CAPSULE ORAL
Status: DISCONTINUED | OUTPATIENT
Start: 2019-09-19 | End: 2019-09-23 | Stop reason: HOSPADM

## 2019-09-19 RX ORDER — MIRTAZAPINE 15 MG/1
15 TABLET, FILM COATED ORAL
Status: DISCONTINUED | OUTPATIENT
Start: 2019-09-19 | End: 2019-09-23 | Stop reason: HOSPADM

## 2019-09-19 RX ORDER — SODIUM CHLORIDE 9 MG/ML
100 INJECTION, SOLUTION INTRAVENOUS CONTINUOUS
Status: DISCONTINUED | OUTPATIENT
Start: 2019-09-19 | End: 2019-09-19

## 2019-09-19 RX ORDER — VENLAFAXINE HYDROCHLORIDE 150 MG/1
150 CAPSULE, EXTENDED RELEASE ORAL DAILY
Status: DISCONTINUED | OUTPATIENT
Start: 2019-09-19 | End: 2019-09-23 | Stop reason: HOSPADM

## 2019-09-19 RX ORDER — LISINOPRIL 5 MG/1
10 TABLET ORAL DAILY
Status: DISCONTINUED | OUTPATIENT
Start: 2019-09-20 | End: 2019-09-20

## 2019-09-19 RX ORDER — POTASSIUM CHLORIDE 1.5 G/1.77G
40 POWDER, FOR SOLUTION ORAL 2 TIMES DAILY
Status: COMPLETED | OUTPATIENT
Start: 2019-09-19 | End: 2019-09-19

## 2019-09-19 RX ORDER — HYDROCHLOROTHIAZIDE 25 MG/1
12.5 TABLET ORAL DAILY
Status: DISCONTINUED | OUTPATIENT
Start: 2019-09-20 | End: 2019-09-20

## 2019-09-19 RX ORDER — DILTIAZEM HYDROCHLORIDE 30 MG/1
30 TABLET, FILM COATED ORAL
Status: DISCONTINUED | OUTPATIENT
Start: 2019-09-19 | End: 2019-09-20

## 2019-09-19 RX ADMIN — VENLAFAXINE HYDROCHLORIDE 150 MG: 150 CAPSULE, EXTENDED RELEASE ORAL at 09:36

## 2019-09-19 RX ADMIN — DILTIAZEM HYDROCHLORIDE 30 MG: 30 TABLET, FILM COATED ORAL at 08:34

## 2019-09-19 RX ADMIN — Medication 10 ML: at 05:10

## 2019-09-19 RX ADMIN — HEPARIN SODIUM 5000 UNITS: 5000 INJECTION INTRAVENOUS; SUBCUTANEOUS at 15:53

## 2019-09-19 RX ADMIN — Medication 10 ML: at 14:33

## 2019-09-19 RX ADMIN — LOPERAMIDE HYDROCHLORIDE 2 MG: 2 CAPSULE ORAL at 09:36

## 2019-09-19 RX ADMIN — Medication 10 ML: at 22:07

## 2019-09-19 RX ADMIN — MIRTAZAPINE 15 MG: 15 TABLET, FILM COATED ORAL at 22:06

## 2019-09-19 RX ADMIN — METRONIDAZOLE 500 MG: 500 TABLET, FILM COATED ORAL at 18:24

## 2019-09-19 RX ADMIN — BUSPIRONE HYDROCHLORIDE 5 MG: 5 TABLET ORAL at 22:06

## 2019-09-19 RX ADMIN — METOPROLOL SUCCINATE 50 MG: 25 TABLET, EXTENDED RELEASE ORAL at 20:20

## 2019-09-19 RX ADMIN — CIPROFLOXACIN HYDROCHLORIDE 500 MG: 500 TABLET, FILM COATED ORAL at 20:20

## 2019-09-19 RX ADMIN — POTASSIUM CHLORIDE 40 MEQ: 1.5 POWDER, FOR SOLUTION ORAL at 08:34

## 2019-09-19 RX ADMIN — DILTIAZEM HYDROCHLORIDE 30 MG: 30 TABLET, FILM COATED ORAL at 15:52

## 2019-09-19 RX ADMIN — POTASSIUM CHLORIDE 40 MEQ: 1.5 POWDER, FOR SOLUTION ORAL at 18:23

## 2019-09-19 RX ADMIN — HEPARIN SODIUM 5000 UNITS: 5000 INJECTION INTRAVENOUS; SUBCUTANEOUS at 23:39

## 2019-09-19 RX ADMIN — BUSPIRONE HYDROCHLORIDE 5 MG: 5 TABLET ORAL at 08:34

## 2019-09-19 RX ADMIN — METRONIDAZOLE 500 MG: 500 TABLET, FILM COATED ORAL at 05:10

## 2019-09-19 RX ADMIN — HEPARIN SODIUM 5000 UNITS: 5000 INJECTION INTRAVENOUS; SUBCUTANEOUS at 08:34

## 2019-09-19 RX ADMIN — DILTIAZEM HYDROCHLORIDE 30 MG: 30 TABLET, FILM COATED ORAL at 12:02

## 2019-09-19 RX ADMIN — BUSPIRONE HYDROCHLORIDE 5 MG: 5 TABLET ORAL at 15:52

## 2019-09-19 NOTE — PROGRESS NOTES
Discussed with Dr. Rupesh Zelaya the large urinary output and the IVF rate at 150 mls per hour, orders to decrease fluids to 100 mls per hour. Patient is resting.

## 2019-09-19 NOTE — PROGRESS NOTES
Nutrition  Reason for assessment: Referral received from nursing admission Malnutrition Screening Tool   Recently Lost Weight Without Trying: No  If Yes, How Much Weight Loss: Unsure  Eating Poorly Due to Decreased Appetite: Yes  Assessment:   Diet: DIET GI SOFT No options chosen  DIET NUTRITIONAL SUPPLEMENTS Lunch, Dinner; [place]    Food/Nutrition Patient History:  Pt presented with vomiting, abdominal pain and diarrhea. Past medical history notable for HTN. Pt lives at home with a part time caregiver. Caregiver prepares her breakfast and lunch meal and her son prepares her supper meal.  Pt endorses eating well at home and stable weight. Appetite fair currently r/t not feeling well. Anthropometrics:Height: 5' 4\" (162.6 cm),  Weight: 56.9 kg (125 lb 6.4 oz), Weight Source: Bed, Body mass index is 21.52 kg/m². BMI class of underweight for Older American Women  Weight history per EMR: unable to determine if weights are actual vs stated due to functionality of [facility]. WT / BMI WEIGHT BODY MASS INDEX   9/19/2019 125 lb 6.4 oz 21.52 kg/m2   6/28/2019 122 lb 23.83 kg/m2   2/26/2019 122 lb 23.83 kg/m2   2/21/2019 122 lb 2 oz 23.85 kg/m2     Macronutrient needs: used 56.9 kg-bed weight  EER:  0971-5393 kcal /day (25-30 kcal/kg BW)  EPR:  57-68 grams protein/day (1-1.2 grams/kg BW) GFR 50  No history of renal disease  Intake/Comparative Standards: 1 recorded intake of 50% at breakfast this am (grits, peaches and coffee). Pt states she ate chicken noodle soup / crackers and ice cream at lunch today-saving her tuna sandwich for later; sipping on a Pepsi. Based on limited data, (verbalized and recorded) pt potentially meets ~ 40-50% estimated kcal needs and <20% estimated protein needs.   Nutrition Diagnosis: Inadequate oral intake r/t decreased ability to consume sufficient oral intake as evidenced by estimates of insufficient intake of energy or high quality protein from diet when compared to requirements. Intervention:  Meals and snacks: Continue current diet. Modify texture of meats to cut up for ease of chewing; noted patient has loose fitting dentures.:  Nutrition Supplement Therapy:  Initiated Ensure Enlive twice daily. Discharge nutrition plan: Too soon to determine. Coordination of Nutrition Care:   CLEM Kate, [address] [place] [address]  [phone]

## 2019-09-19 NOTE — PROGRESS NOTES
Hospitalist Progress Note    2019  Admit Date: 2019 12:57 PM   NAME: Ella Apple   :  1930   MRN:  [id number]   Attending: Sammie Bailey MD  PCP:  Dami Broussard MD    SUBJECTIVE:   Luis Araujo is an 79 yo F admitted  with pan-colitis. Seen with her DIL and caregiver at bedside. She is unable to provide reliable history due to dementia. Her caregiver states she seems to be in less pain today and is not yelling in pain as much. - seen with Kelli Reis, caregiver, at bedside. Ms. Hermila Rivera complains of slight nausea. Denies abdominal pain. Per RN and her caregiver, stool output has slowed. - reports she is feeling okay but has not woken up yet. Transferred to ICU yesterday for a fib with RVR. Cardizem gtt at 2.5 mg/hr. Had 2 loose BMs overnight per staff.       Review of Systems negative with exception of pertinent positives noted above  PHYSICAL EXAM     Visit Vitals  /66   Pulse (!) 109   Temp 99.4 °F (37.4 °C)   Resp 15   Ht 5' 4\" (1.626 m)   Wt 56.9 kg (125 lb 6.4 oz)   SpO2 97%   BMI 21.52 kg/m²      Temp (24hrs), Av.8 °F (37.1 °C), Min:98 °F (36.7 °C), Max:99.7 °F (37.6 °C)    Patient Vitals for the past 24 hrs:   Temp Pulse Resp BP SpO2   19 0716 99.4 °F (37.4 °C)       19 0400  (!) 109 15 129/66 97 %   19 0336 98 °F (36.7 °C)       19 0110  (!) 113 12 139/70 95 %   19 2158  (!) 112 20 109/61 94 %   19 1945     100 %   19 1921 98.9 °F (37.2 °C) (!) 101 18 135/76 93 %   19 1722     93 %   19 1712  87 22  99 %   19 1709 99.7 °F (37.6 °C) (!) 123 18 147/80 98 %   19 1616 98.7 °F (37.1 °C) 89 20 163/70 99 %   19 1240  92      19 1220 98.7 °F (37.1 °C)  18 144/62 97 %   19 1135 98.4 °F (36.9 °C) 74 20 151/73 94 %   19 0825 98.6 °F (37 °C) 89 20 148/53 99 %       Oxygen Therapy  O2 Sat (%): 97 % (19 0400)  Pulse via Oximetry: 108 beats per minute (09/19/19 0400)  O2 Device: Nasal cannula (09/18/19 1945)  O2 Flow Rate (L/min): 2 l/min (09/18/19 1945)    Intake/Output Summary (Last 24 hours) at 9/19/2019 0753  Last data filed at 9/19/2019 0528  Gross per 24 hour   Intake 1065.9 ml   Output 1150 ml   Net -84.1 ml      General: No acute distress, elderly  Lungs:  CTA Bilaterally. Heart:  Irregular rhythm, tachycardic  Abdomen: Soft, no tenderness, + bowel sounds, rectal tube in place  Extremities: No cyanosis, clubbing or edema  Neurologic:  No focal deficits    Recent Results (from the past 24 hour(s))   EKG, 12 LEAD, INITIAL    Collection Time: 09/18/19  4:34 PM   Result Value Ref Range    Ventricular Rate 138 BPM    Atrial Rate 47 BPM    QRS Duration 78 ms    Q-T Interval 294 ms    QTC Calculation (Bezet) 445 ms    Calculated R Axis -53 degrees    Calculated T Axis 145 degrees    Diagnosis       Atrial fibrillation with rapid ventricular response  Left axis deviation  Nonspecific ST and T wave abnormality  Abnormal ECG  When compared with ECG of 28-JUN-2019 15:22,  Significant changes have occurred  Confirmed by ST ETHAN MARCOS MD (), KACI FOX ([id number]) on 9/18/2019 8:09:34 PM     CBC WITH AUTOMATED DIFF    Collection Time: 09/19/19  3:22 AM   Result Value Ref Range    WBC 5.4 4.3 - 11.1 K/uL    RBC 3.13 (L) 4.05 - 5.2 M/uL    HGB 10.0 (L) 11.7 - 15.4 g/dL    HCT 31.1 (L) 35.8 - 46.3 %    MCV 99.4 (H) 79.6 - 97.8 FL    MCH 31.9 26.1 - 32.9 PG    MCHC 32.2 31.4 - 35.0 g/dL    RDW 13.4 11.9 - 14.6 %    PLATELET 374 233 - 448 K/uL    MPV 10.5 9.4 - 12.3 FL    ABSOLUTE NRBC 0.00 0.0 - 0.2 K/uL    NEUTROPHILS 79 (H) 43 - 78 %    LYMPHOCYTES 13 13 - 44 %    MONOCYTES 6 4.0 - 12.0 %    EOSINOPHILS 1 0.5 - 7.8 %    BASOPHILS 0 0.0 - 2.0 %    IMMATURE GRANULOCYTES 1 0.0 - 5.0 %    ABS. NEUTROPHILS 4.2 1.7 - 8.2 K/UL    ABS. LYMPHOCYTES 0.7 0.5 - 4.6 K/UL    ABS. MONOCYTES 0.3 0.1 - 1.3 K/UL    ABS. EOSINOPHILS 0.1 0.0 - 0.8 K/UL    ABS.  BASOPHILS 0.0 0.0 - 0.2 K/UL    ABS. IMM. GRANS. 0.1 0.0 - 0.5 K/UL    RBC COMMENTS NORMOCYTIC/NORMOCHROMIC      WBC COMMENTS Result Confirmed By Smear      PLATELET COMMENTS ADEQUATE      DF AUTOMATED     METABOLIC PANEL, COMPREHENSIVE    Collection Time: 09/19/19  3:22 AM   Result Value Ref Range    Sodium 136 136 - 145 mmol/L    Potassium 3.3 (L) 3.5 - 5.1 mmol/L    Chloride 110 (H) 98 - 107 mmol/L    CO2 21 21 - 32 mmol/L    Anion gap 5 (L) 7 - 16 mmol/L    Glucose 94 65 - 100 mg/dL    BUN 33 (H) 8 - 23 MG/DL    Creatinine 1.09 (H) 0.6 - 1.0 MG/DL    GFR est AA >60 >60 ml/min/1.73m2    GFR est non-AA 50 (L) >60 ml/min/1.73m2    Calcium 9.4 8.3 - 10.4 MG/DL    Bilirubin, total 0.4 0.2 - 1.1 MG/DL    ALT (SGPT) 16 12 - 65 U/L    AST (SGOT) 26 15 - 37 U/L    Alk.  phosphatase 63 50 - 136 U/L    Protein, total 5.8 (L) 6.3 - 8.2 g/dL    Albumin 2.4 (L) 3.2 - 4.6 g/dL    Globulin 3.4 2.3 - 3.5 g/dL    A-G Ratio 0.7 (L) 1.2 - 3.5       Results     Procedure Component Value Units Date/Time    CULTURE, BLOOD [[id number]] Collected:  09/16/19 1710    Order Status:  Completed Specimen:  Blood Updated:  09/18/19 0704     Special Requests: --        LEFT  Antecubital       Culture result: NO GROWTH 2 DAYS       CULTURE, BLOOD [[id number]] Collected:  09/16/19 1710    Order Status:  Completed Specimen:  Blood Updated:  09/18/19 0704     Special Requests: --        RIGHT  Antecubital       Culture result: NO GROWTH 2 DAYS       C. DIFFICILE AG & TOXIN A/B [[id number]] Collected:  09/16/19 1607    Order Status:  Completed Specimen:  Stool Updated:  09/17/19 1213     [address] ANTIGEN       C. DIFFICILE G[facility] ANTIGEN-NEGATIVE           C. difficile toxin       C. DIFFICILE TOXIN-NEGATIVE           PCR Reflex NOT APPLICABLE        INTERPRETATION       NEGATIVE FOR TOXIGENIC C. DIFFICILE           Clinical Consideration       NEGATIVE FOR TOXIGENIC C. DIFFICILE          CULTURE, URINE [[id number]] Collected:  09/16/19 1446    Order Status:  Completed Specimen: Urine from Clean catch Updated:  09/19/19 3854     Special Requests: NO SPECIAL REQUESTS        Culture result:       <10,000  COLONIES/mL  NORMAL SKIN MARY ISOLATED          Imaging:   XR CHEST SNGL V   Final Result   IMPRESSION: Probably normal portable chest               CT ABD PELV WO CONT   Final Result   IMPRESSION:  Mural thickening with stranding densities of the entire colon. This   could be ischemic or inflammatory. C. difficile should be considered. ASSESSMENT      Hospital Problems as of 9/19/2019 Never Reviewed          Codes Class Noted - Resolved POA    * (Principal) Colitis ICD-10-CM: K52.9  ICD-9-CM: 558.9  9/16/2019 - Present Unknown        UTI (urinary tract infection) ICD-10-CM: N39.0  ICD-9-CM: 599.0  9/16/2019 - Present Unknown        MONICA (acute kidney injury) ([facility] [address] ICD-10-CM: N17.9  ICD-9-CM: 584.9  9/16/2019 - Present Unknown        HTN (hypertension) ICD-10-CM: I10  ICD-9-CM: 401.9  9/16/2019 - Present Unknown            Plan:  · Colitis  · Continue PO cipro/flagyl (treat for 10-14 days) day 4  · Seen by GI 9/18 and recommend for transition to PO abx and advance diet to low residue  · Outpatient GI follow up  · Start prn immodium    · MONICA  · Stop IVF as this has resolved. · A fib  · Went back into a fib with RVR 9/18  · Wean cardizem gtt as tolerated  · Start PO cardizem  · Continue toprol (switched from carvedilol 9/18)  · No treatment dose anticoagulation due to history of falls and active colitis    · HTN  · Continue present meds    · Depression with anxiety  · Resume home dose venlafaxine and mirtazapine  · Buspar added yesterday for suspected vocal cord dysfunction    · UTI removed from problem list as urine cx negative. · Continue home dose norco    Dispo- home health on discharge; leave in ICU until heart rate controlled (home next 24-48 hours hopefully)      DVT Prophylaxis: SCDs    Signed By: Catracho Jackson.  Junior Ortega MD     September 19, 2019

## 2019-09-19 NOTE — INTERDISCIPLINARY ROUNDS
Interdisciplinary team rounds were held 9/19/2019 with the following team members:Care Management, Nursing, Pastoral Care, Physical Therapy and Physician and the patient. Plan of care discussed. See clinical pathway and/or care plan for interventions and desired outcomes.

## 2019-09-19 NOTE — PROGRESS NOTES
Bedside shift change report given to Jose Hanley (oncoming nurse) by Payam Avilez (offgoing nurse). Report included the following information SBAR, Kardex, Intake/Output, MAR, Recent Results and Cardiac Rhythm Afib. Pt in bed with eyes closed. Awakens to voice. Pt oriented to person and place, disoriented to time. Lung fields clear, S1, S2 auscultated. Bowel sounds active in all quadrants. Pulses palpable in all extremities. Pt currently dry, purewick placed. Skin thin and fragile, ecchymoses to lower extremities.

## 2019-09-19 NOTE — PROGRESS NOTES
Problem: Mobility Impaired (Adult and Pediatric)  Goal: *Acute Goals and Plan of Care (Insert Text)  Description  DISCHARGE GOALS:  (1.)Ms. Zbigniew Mike will move from supine to sit and sit to supine  in bed with SUPERVISION within 5 treatment day(s). (2.)Ms. Zbigniew Mike will transfer from bed to chair and chair to bed with SUPERVISION using the least restrictive device within 5 treatment day(s). (3.)Ms. Montgomery will ambulate with SUPERVISION for 50 feet with the least restrictive device within 5 treatment day(s). (4.)Ms. Montgomery will ambulate up/down 1 step with a hand hold assist within 5 treatment days. ________________________________________________________________________________________________     Outcome: Progressing Towards Goal     PHYSICAL THERAPY: Daily Note and AM 9/19/2019  INPATIENT: PT Visit Days : 2  [place] / [place] / Product Type: Medicare /       NAME/AGE/GENDER: Emerald Suarez is a 80 y.o. female   PRIMARY DIAGNOSIS: Colitis [K52.9]  UTI (urinary tract infection) [N39.0]  [facility] [N17.9] Colitis   Colitis         ICD-10: Treatment Diagnosis:    · Generalized Muscle Weakness (M62.81)  · Difficulty in walking, Not elsewhere classified (R26.2)   Precaution/Allergies:  Patient has no known allergies. ASSESSMENT:     Ms. Zbigniew Mike presents supine on contact and in the ICU. Having some heart rate elevation and had some cardizem. Heart rate better this morning and RN said it was OK to work with pt, he did mention she is also having some diarrhea. On entry pt stating she is not feeling well today and she did not want to get up. She did agree when asked to work on some exercises with therapy. She participate well with exercises. Asked her again about getting out of bed after exercise and she still did not want to. Hope to progress at next therapy session as pt feels better.      This section established at most recent assessment   PROBLEM LIST (Impairments causing functional limitations):  1. Decreased Strength  2. Decreased ADL/Functional Activities  3. Decreased Transfer Abilities  4. Decreased Ambulation Ability/Technique  5. Decreased Balance  6. Decreased Activity Tolerance   INTERVENTIONS PLANNED: (Benefits and precautions of physical therapy have been discussed with the patient.)  1. Balance Exercise  2. Bed Mobility  3. Family Education  4. Gait Training  5. Home Exercise Program (HEP)  6. Therapeutic Activites  7. Therapeutic Exercise/Strengthening     TREATMENT PLAN: Frequency/Duration: daily for duration of hospital stay  Rehabilitation Potential For Stated Goals: Good     REHAB RECOMMENDATIONS (at time of discharge pending progress):    Placement: It is my opinion, based on this patient's performance to date, that Ms. Montgomery may benefit from [address] after discharge due to the functional deficits listed above that are likely to improve with skilled rehabilitation because he/she has an inability to tolerate transportation to an outpatient setting as evidenced by increased weakness and needed assist for mobility . Equipment:    None at this time              HISTORY:   History of Present Injury/Illness (Reason for Referral):  Pt not a good historian. No family at bedside . Later on after admission was able to speak to son-Kourtney Montgomery and daughter    in law-  According to them pt lives alone, has care giver who comes half a day(care giver had viral illness in Thursday- was off for few days and came back to work today). Pt has diagnosed with afib after her hip surgery in feb- presently not on any medications- says pt is on coreg and metoprolol(? Why). Also on medications for depression and htn. Chronic pain medications for hip pain. PCP has been changing medications for bp as it was uncontrolled.      Pt was ok this am- started having diarrhea,vomiting and abdominal pain- hence care giver called 911.     Pt main c/o was abdominal pain at the time of my examination- says pain medications didn't help. Presently has rectal tube. Pt otherwise didn't c/o headache or dizziness or chest pain or sob or rectal bleeding or hemoptysis. ua nitrite positive  Creatinine  1.64,gfr 31,lactic acid 2.64  C.diff pending  Ct abd pelvis with out contrast-  Mural thickening with stranding densities of the entire colon. This  could be ischemic or inflammatory. C. difficile should be considered. Pt will be admitted for colitis ischemic vs inflammatory. Past Medical History/Comorbidities:   Ms. Robert Howard  has a past medical history of ADHD, Arthritis, and Hypertension. Ms. Robert Howard  has no past surgical history on file. Social History/Living Environment:   Home Environment: Private residence  # Steps to Enter: 2  Wheelchair Ramp: Yes  One/Two Story Residence: One story  Living Alone: Yes  Support Systems: Child(nura), Home care staff  Patient Expects to be Discharged to[de-identified] Private residence  Current DME Used/Available at Home: None  Tub or Shower Type: Shower  Prior Level of Function/Work/Activity:  Ambulating with walker. Number of Personal Factors/Comorbidities that affect the Plan of Care: 1-2: MODERATE COMPLEXITY   EXAMINATION:   Most Recent Physical Functioning:   Gross Assessment:                  Posture:     Balance:    Bed Mobility:     Wheelchair Mobility:     Transfers:     Gait:            Body Structures Involved:  1. Muscles Body Functions Affected:  1. Movement Related Activities and Participation Affected:  1. Mobility   Number of elements that affect the Plan of Care: 3: MODERATE COMPLEXITY   CLINICAL PRESENTATION:   Presentation: Stable and uncomplicated: LOW COMPLEXITY   CLINICAL DECISION MAKIN [facility] [address] [address] AM-PAC 6 Clicks   Basic Mobility Inpatient Short Form  How much difficulty does the patient currently have. .. Unable A Lot A Little None   1.   Turning over in bed (including adjusting bedclothes, sheets and blankets)? ? 1   ? 2   ? 3   ? 4   2. Sitting down on and standing up from a chair with arms ( e.g., wheelchair, bedside commode, etc.)   ? 1   ? 2   ? 3   ? 4   3. Moving from lying on back to sitting on the side of the bed?   ? 1   ? 2   ? 3   ? 4   How much help from another person does the patient currently need. .. Total A Lot A Little None   4. Moving to and from a bed to a chair (including a wheelchair)? ? 1   ? 2   ? 3   ? 4   5. Need to walk in hospital room? ? 1   ? 2   ? 3   ? 4   6. Climbing 3-5 steps with a railing? ? 1   ? 2   ? 3   ? 4   © 2007, Trustees of [address] [place] [address] [address] [address], under license to [employer]. All rights reserved      Score:  Initial: 17 Most Recent: X (Date: -- )    Interpretation of Tool:  Represents activities that are increasingly more difficult (i.e. Bed mobility, Transfers, Gait). Medical Necessity:     · Patient is expected to demonstrate progress in strength, balance and coordination  ·  to decrease assistance required with bed mobility, transfers, and gait. · .  Reason for Services/Other Comments:  · Patient continues to require skilled intervention due to weakness affecting bed mobility, transfers, and gait. · .   Use of outcome tool(s) and clinical judgement create a POC that gives a: Clear prediction of patient's progress: LOW COMPLEXITY            TREATMENT:   (In addition to Assessment/Re-Assessment sessions the following treatments were rendered)   Pre-treatment Symptoms/Complaints:  Patient agreeable to working with therapy but not feeling good today  Pain: Initial:      Post Session:  0     Therapeutic Exercise: (15 Minutes):  Exercises per grid below to improve mobility and strength. Required minimal verbal and manual cues to promote proper body alignment and promote proper body posture. Progressed repetitions as indicated.      Date:  9/16/19 Date:   Date:     Activity/Exercise Parameters Parameters Parameters   Ankle pumps 15     Quad sets 15     Gluteal sets 15     Heel slides 15     Straight leg raisses 15     Hip abd/adduction 15                   Braces/Orthotics/Lines/Etc:   · IV  · pure wick, oxygen, telemetry monitoring  · Treatment/Session Assessment:    · Response to Treatment:  Patient participated well with exercises but not feeling well  · Interdisciplinary Collaboration:   o Registered Nurse  · After treatment position/precautions:   o Supine in bed  o Bed/Chair-wheels locked  o Call light within reach   · Compliance with Program/Exercises: Will assess as treatment progresses  · Recommendations/Intent for next treatment session: \"Next visit will focus on reduction in assistance provided\".   Total Treatment Duration:  PT Patient Time In/Time Out  Time In: 1135  Time Out: [address],

## 2019-09-19 NOTE — PROGRESS NOTES
Problem: Risk for Spread of Infection  Goal: Prevent transmission of infectious organism to others  Description  Prevent the transmission of infectious organisms to other patients, staff members, and visitors. Outcome: Progressing Towards Goal     Problem: Patient Education:  Go to Education Activity  Goal: Patient/Family Education  Outcome: Progressing Towards Goal     Problem: Falls - Risk of  Goal: *Absence of Falls  Description  Document Anamika Watson Fall Risk and appropriate interventions in the flowsheet. Outcome: Progressing Towards Goal  Note:   Fall Risk Interventions:  Mobility Interventions: Bed/chair exit alarm, Patient to call before getting OOB    Mentation Interventions: Bed/chair exit alarm    Medication Interventions: Bed/chair exit alarm, Patient to call before getting OOB    Elimination Interventions: Call light in reach, Patient to call for help with toileting needs    History of Falls Interventions: Bed/chair exit alarm, Door open when patient unattended         Problem: Patient Education: Go to Patient Education Activity  Goal: Patient/Family Education  Outcome: Progressing Towards Goal     Problem: Pressure Injury - Risk of  Goal: *Prevention of pressure injury  Description  Document Pietro Scale and appropriate interventions in the flowsheet.   Outcome: Progressing Towards Goal  Note:   Pressure Injury Interventions:  Sensory Interventions: Assess changes in LOC, Minimize linen layers, Pad between skin to skin    Moisture Interventions: Limit adult briefs, Maintain skin hydration (lotion/cream), Minimize layers    Activity Interventions: Assess need for specialty bed    Mobility Interventions: Assess need for specialty bed    Nutrition Interventions: Discuss nutritional consult with provider, Document food/fluid/supplement intake    Friction and Shear Interventions: Minimize layers                Problem: Patient Education: Go to Patient Education Activity  Goal: Patient/Family Education  Outcome: Progressing Towards Goal     Problem: Patient Education: Go to Patient Education Activity  Goal: Patient/Family Education  Outcome: Progressing Towards Goal

## 2019-09-20 LAB
ANION GAP SERPL CALC-SCNC: 6 MMOL/L (ref 7–16)
BUN SERPL-MCNC: 22 MG/DL (ref 8–23)
CALCIUM SERPL-MCNC: 9.1 MG/DL (ref 8.3–10.4)
CHLORIDE SERPL-SCNC: 109 MMOL/L (ref 98–107)
CO2 SERPL-SCNC: 23 MMOL/L (ref 21–32)
CREAT SERPL-MCNC: 1.02 MG/DL (ref 0.6–1)
GLUCOSE SERPL-MCNC: 114 MG/DL (ref 65–100)
POTASSIUM SERPL-SCNC: 4.3 MMOL/L (ref 3.5–5.1)
SODIUM SERPL-SCNC: 138 MMOL/L (ref 136–145)

## 2019-09-20 PROCEDURE — 74011250636 HC RX REV CODE- 250/636: Performed by: FAMILY MEDICINE

## 2019-09-20 PROCEDURE — 97110 THERAPEUTIC EXERCISES: CPT

## 2019-09-20 PROCEDURE — 65610000001 HC ROOM ICU GENERAL

## 2019-09-20 PROCEDURE — 36415 COLL VENOUS BLD VENIPUNCTURE: CPT

## 2019-09-20 PROCEDURE — 74011250636 HC RX REV CODE- 250/636: Performed by: HOSPITALIST

## 2019-09-20 PROCEDURE — 80048 BASIC METABOLIC PNL TOTAL CA: CPT

## 2019-09-20 PROCEDURE — 74011250637 HC RX REV CODE- 250/637: Performed by: INTERNAL MEDICINE

## 2019-09-20 PROCEDURE — 97530 THERAPEUTIC ACTIVITIES: CPT

## 2019-09-20 PROCEDURE — 77010033678 HC OXYGEN DAILY

## 2019-09-20 RX ORDER — HALOPERIDOL 5 MG/ML
2 INJECTION INTRAMUSCULAR ONCE
Status: COMPLETED | OUTPATIENT
Start: 2019-09-20 | End: 2019-09-20

## 2019-09-20 RX ORDER — LISINOPRIL 5 MG/1
10 TABLET ORAL DAILY
Status: DISCONTINUED | OUTPATIENT
Start: 2019-09-20 | End: 2019-09-23 | Stop reason: HOSPADM

## 2019-09-20 RX ORDER — DILTIAZEM HYDROCHLORIDE 120 MG/1
120 CAPSULE, COATED, EXTENDED RELEASE ORAL DAILY
Status: DISCONTINUED | OUTPATIENT
Start: 2019-09-21 | End: 2019-09-23 | Stop reason: HOSPADM

## 2019-09-20 RX ORDER — LISINOPRIL AND HYDROCHLOROTHIAZIDE 10; 12.5 MG/1; MG/1
1 TABLET ORAL DAILY
Status: DISCONTINUED | OUTPATIENT
Start: 2019-09-20 | End: 2019-09-20

## 2019-09-20 RX ORDER — METOPROLOL SUCCINATE 100 MG/1
100 TABLET, EXTENDED RELEASE ORAL EVERY EVENING
Status: DISCONTINUED | OUTPATIENT
Start: 2019-09-20 | End: 2019-09-23 | Stop reason: HOSPADM

## 2019-09-20 RX ORDER — AMLODIPINE BESYLATE 5 MG/1
5 TABLET ORAL DAILY
Status: DISCONTINUED | OUTPATIENT
Start: 2019-09-20 | End: 2019-09-23 | Stop reason: HOSPADM

## 2019-09-20 RX ORDER — HYDROCHLOROTHIAZIDE 25 MG/1
12.5 TABLET ORAL DAILY
Status: DISCONTINUED | OUTPATIENT
Start: 2019-09-20 | End: 2019-09-23 | Stop reason: HOSPADM

## 2019-09-20 RX ORDER — DILTIAZEM HYDROCHLORIDE 60 MG/1
60 TABLET, FILM COATED ORAL
Status: DISCONTINUED | OUTPATIENT
Start: 2019-09-20 | End: 2019-09-20

## 2019-09-20 RX ORDER — METOPROLOL TARTRATE 50 MG/1
50 TABLET ORAL ONCE
Status: COMPLETED | OUTPATIENT
Start: 2019-09-20 | End: 2019-09-20

## 2019-09-20 RX ORDER — DILTIAZEM HYDROCHLORIDE 30 MG/1
30 TABLET, FILM COATED ORAL
Status: COMPLETED | OUTPATIENT
Start: 2019-09-20 | End: 2019-09-20

## 2019-09-20 RX ADMIN — DILTIAZEM HYDROCHLORIDE 30 MG: 30 TABLET, FILM COATED ORAL at 16:22

## 2019-09-20 RX ADMIN — BUSPIRONE HYDROCHLORIDE 5 MG: 5 TABLET ORAL at 21:36

## 2019-09-20 RX ADMIN — BUSPIRONE HYDROCHLORIDE 5 MG: 5 TABLET ORAL at 08:51

## 2019-09-20 RX ADMIN — CIPROFLOXACIN HYDROCHLORIDE 500 MG: 500 TABLET, FILM COATED ORAL at 21:35

## 2019-09-20 RX ADMIN — HEPARIN SODIUM 5000 UNITS: 5000 INJECTION INTRAVENOUS; SUBCUTANEOUS at 07:34

## 2019-09-20 RX ADMIN — METRONIDAZOLE 500 MG: 500 TABLET, FILM COATED ORAL at 17:52

## 2019-09-20 RX ADMIN — METOPROLOL TARTRATE 50 MG: 50 TABLET ORAL at 08:47

## 2019-09-20 RX ADMIN — MIRTAZAPINE 15 MG: 15 TABLET, FILM COATED ORAL at 21:36

## 2019-09-20 RX ADMIN — Medication 10 ML: at 14:27

## 2019-09-20 RX ADMIN — HEPARIN SODIUM 5000 UNITS: 5000 INJECTION INTRAVENOUS; SUBCUTANEOUS at 23:54

## 2019-09-20 RX ADMIN — HEPARIN SODIUM 5000 UNITS: 5000 INJECTION INTRAVENOUS; SUBCUTANEOUS at 16:22

## 2019-09-20 RX ADMIN — HYDROCHLOROTHIAZIDE 12.5 MG: 25 TABLET ORAL at 08:46

## 2019-09-20 RX ADMIN — METRONIDAZOLE 500 MG: 500 TABLET, FILM COATED ORAL at 06:05

## 2019-09-20 RX ADMIN — VENLAFAXINE HYDROCHLORIDE 150 MG: 150 CAPSULE, EXTENDED RELEASE ORAL at 08:51

## 2019-09-20 RX ADMIN — DILTIAZEM HYDROCHLORIDE 30 MG: 30 TABLET, FILM COATED ORAL at 11:41

## 2019-09-20 RX ADMIN — METOPROLOL SUCCINATE 100 MG: 100 TABLET, EXTENDED RELEASE ORAL at 21:35

## 2019-09-20 RX ADMIN — AMLODIPINE BESYLATE 5 MG: 5 TABLET ORAL at 17:52

## 2019-09-20 RX ADMIN — Medication 10 ML: at 06:05

## 2019-09-20 RX ADMIN — HALOPERIDOL LACTATE 2 MG: 5 INJECTION, SOLUTION INTRAMUSCULAR at 21:31

## 2019-09-20 RX ADMIN — DILTIAZEM HYDROCHLORIDE 30 MG: 30 TABLET, FILM COATED ORAL at 07:34

## 2019-09-20 RX ADMIN — BUSPIRONE HYDROCHLORIDE 5 MG: 5 TABLET ORAL at 16:22

## 2019-09-20 RX ADMIN — LISINOPRIL 10 MG: 5 TABLET ORAL at 11:41

## 2019-09-20 RX ADMIN — Medication 10 ML: at 21:37

## 2019-09-20 NOTE — PROGRESS NOTES
Hospitalist Progress Note    2019  Admit Date: 2019 12:57 PM   NAME: Lauren Marcelino   :  1930   MRN:  [id number]   Attending: Dank Green MD  PCP:  Gabrielle Lemons MD    SUBJECTIVE:   Axel Bonilla is an 81 yo F admitted  with pan-colitis. Seen with her DIL and caregiver at bedside. She is unable to provide reliable history due to dementia. Her caregiver states she seems to be in less pain today and is not yelling in pain as much. - seen with Kellie Hernandez, caregiver, at bedside. Ms. Allan Sawyer complains of slight nausea. Denies abdominal pain. Per RN and her caregiver, stool output has slowed. - reports she is feeling okay but has not woken up yet. Transferred to ICU yesterday for a fib with RVR. Cardizem gtt at 2.5 mg/hr. Had 2 loose BMs overnight per staff.  - reports she is doing okay. Had a few loose stools over last 24 hours and RN states it seems to be related to dairy products (worsened after ice cream). Heart rate still uncontrolled and BP increasing.       Review of Systems negative with exception of pertinent positives noted above  PHYSICAL EXAM     Visit Vitals  /86   Pulse (!) 118   Temp 99 °F (37.2 °C)   Resp 19   Ht 5' 4\" (1.626 m)   Wt 58.1 kg (128 lb 1.6 oz)   SpO2 99%   BMI 21.99 kg/m²      Temp (24hrs), Av.5 °F (36.9 °C), Min:98 °F (36.7 °C), Max:99.1 °F (37.3 °C)    Patient Vitals for the past 24 hrs:   Temp Pulse Resp BP SpO2   19 0741     99 %   19 0734 99 °F (37.2 °C)       19 0400 98 °F (36.7 °C) (!) 118 19 167/86 96 %   19 0351  (!) 118 24 (!) 164/92 95 %   19 0029 98 °F (36.7 °C) 90 16 158/74    19 2209     95 %   19 98 °F (36.7 °C) 98 22 156/78 93 %   19 1900     94 %   19 1800  81 23 164/83 (!) 89 %   19 1700  92 (!) 37 (!) 168/91 (!) 86 %   19 1600 98.6 °F (37 °C) 92 25 171/90 (!) 88 %   19 1500  80 24 166/89 99 %   19 1400  88 19 118/62 93 %   09/19/19 1300  (!) 102 (!) 36 156/86 (!) 83 %   09/19/19 1207  91 24 189/84 95 %   09/19/19 1205 99.1 °F (37.3 °C) 95 (!) 64 186/89 96 %   09/19/19 1202  95  (!) 188/100    09/19/19 1200  98 (!) 46 (!) 188/100 (!) 85 %   09/19/19 1100  72 20 150/72 (!) 79 %   09/19/19 1000  96 22 165/78 (!) 80 %   09/19/19 0930  93 21 170/90 100 %   09/19/19 0904  (!) 141 29 (!) 180/99 96 %   09/19/19 0900  (!) 129 26 (!) 198/108 93 %   09/19/19 0848  (!) 147 (!) 33  100 %       Oxygen Therapy  O2 Sat (%): 99 % (09/20/19 0741)  Pulse via Oximetry: 113 beats per minute (09/20/19 0741)  O2 Device: Nasal cannula (09/20/19 0741)  O2 Flow Rate (L/min): 2 l/min (09/20/19 0741)    Intake/Output Summary (Last 24 hours) at 9/20/2019 0804  Last data filed at 9/20/2019 0400  Gross per 24 hour   Intake 1013.5 ml   Output 850 ml   Net 163.5 ml      General: No acute distress, elderly  Lungs:  CTA Bilaterally.    Heart:  Irregular rhythm, tachycardic  Abdomen: Soft, no tenderness, + bowel sounds, rectal tube in place  Extremities: No cyanosis, clubbing or edema  Neurologic:  No focal deficits    Recent Results (from the past 24 hour(s))   PLEASE READ & DOCUMENT PPD TEST IN 48 HRS    Collection Time: 09/19/19  5:41 PM   Result Value Ref Range    PPD Negative Negative    mm Induration 0 0 - 5 mm   METABOLIC PANEL, BASIC    Collection Time: 09/20/19  3:08 AM   Result Value Ref Range    Sodium 138 136 - 145 mmol/L    Potassium 4.3 3.5 - 5.1 mmol/L    Chloride 109 (H) 98 - 107 mmol/L    CO2 23 21 - 32 mmol/L    Anion gap 6 (L) 7 - 16 mmol/L    Glucose 114 (H) 65 - 100 mg/dL    BUN 22 8 - 23 MG/DL    Creatinine 1.02 (H) 0.6 - 1.0 MG/DL    GFR est AA >60 >60 ml/min/1.73m2    GFR est non-AA 54 (L) >60 ml/min/1.73m2    Calcium 9.1 8.3 - 10.4 MG/DL     Results     Procedure Component Value Units Date/Time    CULTURE, BLOOD [[id number]] Collected:  09/16/19 1710    Order Status:  Completed Specimen:  Blood Updated:  09/19/19 8787     Special Requests: --        LEFT  Antecubital       Culture result: NO GROWTH 3 DAYS       CULTURE, BLOOD [[id number]] Collected:  09/16/19 1710    Order Status:  Completed Specimen:  Blood Updated:  09/19/19 0808     Special Requests: --        RIGHT  Antecubital       Culture result: NO GROWTH 3 DAYS       C. DIFFICILE AG & TOXIN A/B [[id number]] Collected:  09/16/19 1607    Order Status:  Completed Specimen:  Stool Updated:  09/17/19 1213     [address] [place] ANTIGEN       C. DIFFICILE G[facility] ANTIGEN-NEGATIVE           C. difficile toxin       C. DIFFICILE TOXIN-NEGATIVE           PCR Reflex NOT APPLICABLE        INTERPRETATION       NEGATIVE FOR TOXIGENIC C. DIFFICILE           Clinical Consideration       NEGATIVE FOR TOXIGENIC C. DIFFICILE          CULTURE, URINE [[id number]] Collected:  09/16/19 1446    Order Status:  Completed Specimen:  Urine from Clean catch Updated:  09/19/19 0638     Special Requests: NO SPECIAL REQUESTS        Culture result:       <10,000  COLONIES/mL  NORMAL SKIN MARY ISOLATED          Imaging:   XR CHEST SNGL V   Final Result   IMPRESSION: Probably normal portable chest               CT ABD PELV WO CONT   Final Result   IMPRESSION:  Mural thickening with stranding densities of the entire colon. This   could be ischemic or inflammatory. C. difficile should be considered.                ASSESSMENT      Hospital Problems as of 9/20/2019 Never Reviewed          Codes Class Noted - Resolved POA    Paroxysmal atrial fibrillation ([facility] ICD-10-CM: I48.0  ICD-9-CM: 427.31  9/19/2019 - Present Yes        Atrial fibrillation with RVR ([facility] ICD-10-CM: I48.91  ICD-9-CM: 427.31  9/19/2019 - Present No        Depression with anxiety ICD-10-CM: F41.8  ICD-9-CM: 300.4  9/19/2019 - Present Yes        * (Principal) Colitis ICD-10-CM: K52.9  ICD-9-CM: 558.9  9/16/2019 - Present Yes        MONICA (acute kidney injury) ([facility] ICD-10-CM: N17.9  ICD-9-CM: 584.9  9/16/2019 - Present Yes        HTN (hypertension) ICD-10-CM: I10  ICD-9-CM: 401.9  9/16/2019 - Present Yes            Plan:  · Colitis  · Continue PO cipro/flagyl (treat for 10-14 days) day 5  · Seen by GI 9/18 and recommend for transition to PO abx and advance diet to low residue  · Outpatient GI follow up  · Prn immodium    · MONICA  · Resolved    · A fib  · Went back into a fib with RVR 9/18  · Increase PO cardizem from 30 to 60 mg TID  · Give lopressor 50 mg once now  · Increase toprol to 100 mg this evening (switched from carvedilol 9/18)  · No treatment dose anticoagulation due to history of falls and active colitis    · HTN  · Increase toprol and cardizem  · Lisinopril/hctz restarted yesterda  · Titrate meds as needed    · Depression with anxiety  · Continue home dose venlafaxine and mirtazapine  · Buspar added 9/18 for suspected vocal cord dysfunction    · UTI removed from problem list as urine cx negative.   · Continue home dose [employer]    Dispo- home health on discharge; leave in ICU until heart rate controlled (home next 24-48 hours hopefully)      DVT Prophylaxis: SCDs    Signed By: Arin Lambert MD     September 20, 2019

## 2019-09-20 NOTE — PROGRESS NOTES
Bedside shift change report given to Jose Hanley (oncoming nurse) by Jammie Max (offgoing nurse). Report included the following information SBAR, Kardex, Intake/Output, MAR, Recent Results and Cardiac Rhythm Afib.

## 2019-09-20 NOTE — PROGRESS NOTES
Using bedpan for thick stools not quite formed. Voiding as well, body and linen clean. She does not want to do any PT today and is constantly reminded to look forward to it.

## 2019-09-20 NOTE — PROGRESS NOTES
Care Management Interventions  PCP Verified by CM: Yes  Mode of Transport at Discharge: Other (see comment)  Transition of Care Consult (CM Consult): Other  Current Support Network: Lives Alone(7 days a week care givers)  Confirm Follow Up Transport: Family  Plan discussed with Pt/Family/Caregiver: Yes  Freedom of Choice Offered: Yes  Discharge Location  Discharge Placement: Home with home health([facility])   Saw pt in interdisciplinarily rounds with the following disciplines: Physician, Case Management, Nursing, Dietary,Therapy and Pharmacy. The plan of care was discussed along with discharge date/ location. Plans os for pt to d/c Saturday 9/20 with [facility].

## 2019-09-20 NOTE — PROGRESS NOTES
Problem: Mobility Impaired (Adult and Pediatric)  Goal: *Acute Goals and Plan of Care (Insert Text)  Description  DISCHARGE GOALS:  (1.)Ms. Tate Douglas will move from supine to sit and sit to supine  in bed with SUPERVISION within 5 treatment day(s). (2.)Ms. Tate Douglas will transfer from bed to chair and chair to bed with SUPERVISION using the least restrictive device within 5 treatment day(s). (3.)Ms. Montgomery will ambulate with SUPERVISION for 50 feet with the least restrictive device within 5 treatment day(s). (4.)Ms. Montgomery will ambulate up/down 1 step with a hand hold assist within 5 treatment days. ________________________________________________________________________________________________     Outcome: Progressing Towards Goal     PHYSICAL THERAPY: Daily Note and AM 9/20/2019  INPATIENT: PT Visit Days : 3  [place] / [place] / Product Type: Medicare /       NAME/AGE/GENDER: Bernadette Levi is a 80 y.o. female   PRIMARY DIAGNOSIS: Colitis [K52.9]  UTI (urinary tract infection) [N39.0]  MONICA (acute kidney injury) ([facility] [N17.9] Colitis   Colitis         ICD-10: Treatment Diagnosis:    · Generalized Muscle Weakness (M62.81)  · Difficulty in walking, Not elsewhere classified (R26.2)   Precaution/Allergies:  Patient has no known allergies. ASSESSMENT:     Ms. Tate Douglas presents supine on contact and in the ICU. Having some heart rate elevation and had some cardizem. Heart rate better this morning and RN said it was OK to work with pt, he did mention she is also having some diarrhea. On entry pt stating she is not feeling well today and she did not want to get up. She did agree when asked to work on some exercises with therapy. She participate well with exercises. Asked her again about getting out of bed after exercise and she still did not want to. Hope to progress at next therapy session as pt feels better.    9/20 am supine upon arrival.  Performs exercises with verbal cues and demonstration. Performs bed mobility as follows: supine>EOB with Min A. Sit><Stand x 3 and walk 10 ft to the recliner. Left in recliner with call light near with sitting present. This section established at most recent assessment   PROBLEM LIST (Impairments causing functional limitations):  1. Decreased Strength  2. Decreased ADL/Functional Activities  3. Decreased Transfer Abilities  4. Decreased Ambulation Ability/Technique  5. Decreased Balance  6. Decreased Activity Tolerance   INTERVENTIONS PLANNED: (Benefits and precautions of physical therapy have been discussed with the patient.)  1. Balance Exercise  2. Bed Mobility  3. Family Education  4. Gait Training  5. Home Exercise Program (HEP)  6. Therapeutic Activites  7. Therapeutic Exercise/Strengthening     TREATMENT PLAN: Frequency/Duration: daily for duration of hospital stay  Rehabilitation Potential For Stated Goals: Good     REHAB RECOMMENDATIONS (at time of discharge pending progress):    Placement: It is my opinion, based on this patient's performance to date, that Ms. Montgomery may benefit from [address] after discharge due to the functional deficits listed above that are likely to improve with skilled rehabilitation because he/she has an inability to tolerate transportation to an outpatient setting as evidenced by increased weakness and needed assist for mobility . Equipment:    None at this time              HISTORY:   History of Present Injury/Illness (Reason for Referral):  Pt not a good historian. No family at bedside . Later on after admission was able to speak to son-Kourtney Montgomery and daughter    in law-  According to them pt lives alone, has care giver who comes half a day(care giver had viral illness in Thursday- was off for few days and came back to work today).   Pt has diagnosed with afib after her hip surgery in feb- presently not on any medications- says pt is on coreg and metoprolol(? Why).Also on medications for depression and htn. Chronic pain medications for hip pain. PCP has been changing medications for bp as it was uncontrolled. Pt was ok this am- started having diarrhea,vomiting and abdominal pain- hence care giver called 911. Pt main c/o was abdominal pain at the time of my examination- says pain medications didn't help. Presently has rectal tube. Pt otherwise didn't c/o headache or dizziness or chest pain or sob or rectal bleeding or hemoptysis. ua nitrite positive  Creatinine  1.64,gfr 31,lactic acid 2.64  C.diff pending  Ct abd pelvis with out contrast-  Mural thickening with stranding densities of the entire colon. This  could be ischemic or inflammatory. C. difficile should be considered. Pt will be admitted for colitis ischemic vs inflammatory. Past Medical History/Comorbidities:   Ms. Horace Eugene  has a past medical history of ADHD, Arthritis, and Hypertension. Ms. Horace Eugene  has no past surgical history on file. Social History/Living Environment:   Home Environment: Private residence  # Steps to Enter: 2  Wheelchair Ramp: Yes  One/Two Story Residence: One story  Living Alone: Yes  Support Systems: Child(nura), Home care staff  Patient Expects to be Discharged to[de-identified] Private residence  Current DME Used/Available at Home: None  Tub or Shower Type: Shower  Prior Level of Function/Work/Activity:  Ambulating with walker.      Number of Personal Factors/Comorbidities that affect the Plan of Care: 1-2: MODERATE COMPLEXITY   EXAMINATION:   Most Recent Physical Functioning:   Gross Assessment:                  Posture:     Balance:  Sitting: Intact  Standing: With support Bed Mobility:  Supine to Sit: Minimum assistance  Sit to Supine: (left up in recliner)  Scooting: Minimum assistance  Level of Assistance: Minimum assistance  Wheelchair Mobility:     Transfers:  Sit to Stand: Minimum assistance  Stand to Sit: Minimum assistance  Bed to Chair: Minimum assistance  Level of Assistance: Minimum assistance  Duration: 15 Minutes  Gait:     Base of Support: Center of gravity altered  Speed/Romina: Slow  Step Length: Left shortened;Right shortened  Gait Abnormalities: Decreased step clearance  Distance (ft): 10 Feet (ft)  Assistive Device: (HHA)  Ambulation - Level of Assistance: Minimal assistance  Interventions: Manual cues; Safety awareness training      Body Structures Involved:  1. Muscles Body Functions Affected:  1. Movement Related Activities and Participation Affected:  1. Mobility   Number of elements that affect the Plan of Care: 3: MODERATE COMPLEXITY   CLINICAL PRESENTATION:   Presentation: Stable and uncomplicated: LOW COMPLEXITY   CLINICAL DECISION MAKIN[address] [place] [address] AM-PAC 6 Clicks   Basic Mobility Inpatient Short Form  How much difficulty does the patient currently have. .. Unable A Lot A Little None   1. Turning over in bed (including adjusting bedclothes, sheets and blankets)? ? 1   ? 2   ? 3   ? 4   2. Sitting down on and standing up from a chair with arms ( e.g., wheelchair, bedside commode, etc.)   ? 1   ? 2   ? 3   ? 4   3. Moving from lying on back to sitting on the side of the bed?   ? 1   ? 2   ? 3   ? 4   How much help from another person does the patient currently need. .. Total A Lot A Little None   4. Moving to and from a bed to a chair (including a wheelchair)? ? 1   ? 2   ? 3   ? 4   5. Need to walk in hospital room? ? 1   ? 2   ? 3   ? 4   6. Climbing 3-5 steps with a railing? ? 1   ? 2   ? 3   ? 4   © , Trustees of [address] [place] [address], under license to [employer]. All rights reserved      Score:  Initial: 17 Most Recent: X (Date: -- )    Interpretation of Tool:  Represents activities that are increasingly more difficult (i.e. Bed mobility, Transfers, Gait).     Medical Necessity:     · Patient is expected to demonstrate progress in strength, balance and coordination  ·  to decrease assistance required with bed mobility, transfers, and gait. · .  Reason for Services/Other Comments:  · Patient continues to require skilled intervention due to weakness affecting bed mobility, transfers, and gait. · .   Use of outcome tool(s) and clinical judgement create a POC that gives a: Clear prediction of patient's progress: LOW COMPLEXITY            TREATMENT:   (In addition to Assessment/Re-Assessment sessions the following treatments were rendered)   Pre-treatment Symptoms/Complaints:  Patient agreeable to get up  Pain: Initial:   Pain Intensity 1: 0  Post Session:  0     Therapeutic Exercise: (15 Minutes):  Exercises per grid below to improve mobility and strength. Required minimal verbal and manual cues to promote proper body alignment and promote proper body posture. Progressed repetitions as indicated. Therapeutic Activity: (  15 Minutes ):  Therapeutic activities including Chair transfers along with bed mobility with Min A. Date:  9/16/19 Date:  9/20   Date:     Activity/Exercise Parameters Parameters Parameters   Ankle pumps 15 12    Quad sets 15 12    Gluteal sets 15 12    Heel slides 15 12    Straight leg raisses 15 12    Hip abd/adduction 15 12                  Braces/Orthotics/Lines/Etc:   · IV  · pure wick, oxygen, telemetry monitoring  · Treatment/Session Assessment:    · Response to Treatment:  Patient participated well with therapy  · Interdisciplinary Collaboration:   o Registered Nurse  · After treatment position/precautions:   o Up in chair  o Bed/Chair-wheels locked  o Call light within reach   · Compliance with Program/Exercises: Will assess as treatment progresses  · Recommendations/Intent for next treatment session: \"Next visit will focus on reduction in assistance provided\".   Total Treatment Duration:  PT Patient Time In/Time Out  Time In: 1015  Time Out: [address] Leandro[address], PTA

## 2019-09-20 NOTE — PROGRESS NOTES
Problem: Risk for Spread of Infection  Goal: Prevent transmission of infectious organism to others  Description  Prevent the transmission of infectious organisms to other patients, staff members, and visitors. Outcome: Resolved/Met     Problem: Patient Education:  Go to Education Activity  Goal: Patient/Family Education  Outcome: Progressing Towards Goal     Problem: Falls - Risk of  Goal: *Absence of Falls  Description  Document [facility] Fall Risk and appropriate interventions in the flowsheet.   Outcome: Progressing Towards Goal  Note:   Fall Risk Interventions:  Mobility Interventions: Assess mobility with egress test, Bed/chair exit alarm, Communicate number of staff needed for ambulation/transfer, Patient to call before getting OOB, OT consult for ADLs    Mentation Interventions: Adequate sleep, hydration, pain control, Bed/chair exit alarm, Door open when patient unattended, Evaluate medications/consider consulting pharmacy, Eyeglasses and hearing aids    Medication Interventions: Bed/chair exit alarm, Evaluate medications/consider consulting pharmacy, Patient to call before getting OOB    Elimination Interventions: Bed/chair exit alarm, Call light in reach, Elevated toilet seat, Patient to call for help with toileting needs    History of Falls Interventions: Bed/chair exit alarm, Consult care management for discharge planning, Door open when patient unattended, Evaluate medications/consider consulting pharmacy

## 2019-09-20 NOTE — PROGRESS NOTES
Pt back in bed. Pt able to ambulate to bed from chair with two person assist.  HR 81, RR 23, O2 sat 90%.

## 2019-09-20 NOTE — PROGRESS NOTES
Problem: Risk for Spread of Infection  Goal: Prevent transmission of infectious organism to others  Description  Prevent the transmission of infectious organisms to other patients, staff members, and visitors. Outcome: Progressing Towards Goal     Problem: Patient Education:  Go to Education Activity  Goal: Patient/Family Education  Outcome: Progressing Towards Goal     Problem: Falls - Risk of  Goal: *Absence of Falls  Description  Document [facility] Fall Risk and appropriate interventions in the flowsheet.   Outcome: Progressing Towards Goal  Note:   Fall Risk Interventions:  Mobility Interventions: Bed/chair exit alarm    Mentation Interventions: Bed/chair exit alarm    Medication Interventions: Bed/chair exit alarm    Elimination Interventions: Call light in reach    History of Falls Interventions: Bed/chair exit alarm, Room close to nurse's station

## 2019-09-21 LAB
ANION GAP SERPL CALC-SCNC: 5 MMOL/L (ref 7–16)
BACTERIA SPEC CULT: NORMAL
BACTERIA SPEC CULT: NORMAL
BUN SERPL-MCNC: 17 MG/DL (ref 8–23)
CALCIUM SERPL-MCNC: 8.8 MG/DL (ref 8.3–10.4)
CHLORIDE SERPL-SCNC: 110 MMOL/L (ref 98–107)
CO2 SERPL-SCNC: 24 MMOL/L (ref 21–32)
CREAT SERPL-MCNC: 0.93 MG/DL (ref 0.6–1)
GLUCOSE SERPL-MCNC: 102 MG/DL (ref 65–100)
POTASSIUM SERPL-SCNC: 3.8 MMOL/L (ref 3.5–5.1)
SERVICE CMNT-IMP: NORMAL
SERVICE CMNT-IMP: NORMAL
SODIUM SERPL-SCNC: 139 MMOL/L (ref 136–145)

## 2019-09-21 PROCEDURE — 97530 THERAPEUTIC ACTIVITIES: CPT

## 2019-09-21 PROCEDURE — 65660000000 HC RM CCU STEPDOWN

## 2019-09-21 PROCEDURE — 74011250636 HC RX REV CODE- 250/636: Performed by: FAMILY MEDICINE

## 2019-09-21 PROCEDURE — 74011250636 HC RX REV CODE- 250/636: Performed by: INTERNAL MEDICINE

## 2019-09-21 PROCEDURE — 80048 BASIC METABOLIC PNL TOTAL CA: CPT

## 2019-09-21 PROCEDURE — 74011250637 HC RX REV CODE- 250/637: Performed by: INTERNAL MEDICINE

## 2019-09-21 PROCEDURE — 36415 COLL VENOUS BLD VENIPUNCTURE: CPT

## 2019-09-21 RX ORDER — CIPROFLOXACIN 500 MG/1
500 TABLET ORAL EVERY 12 HOURS
Status: DISCONTINUED | OUTPATIENT
Start: 2019-09-21 | End: 2019-09-23 | Stop reason: HOSPADM

## 2019-09-21 RX ORDER — HALOPERIDOL 5 MG/ML
2 INJECTION INTRAMUSCULAR
Status: DISCONTINUED | OUTPATIENT
Start: 2019-09-21 | End: 2019-09-23 | Stop reason: HOSPADM

## 2019-09-21 RX ORDER — QUETIAPINE FUMARATE 25 MG/1
25 TABLET, FILM COATED ORAL
Status: DISCONTINUED | OUTPATIENT
Start: 2019-09-21 | End: 2019-09-23 | Stop reason: HOSPADM

## 2019-09-21 RX ADMIN — HEPARIN SODIUM 5000 UNITS: 5000 INJECTION INTRAVENOUS; SUBCUTANEOUS at 08:49

## 2019-09-21 RX ADMIN — QUETIAPINE FUMARATE 25 MG: 25 TABLET ORAL at 21:55

## 2019-09-21 RX ADMIN — DILTIAZEM HYDROCHLORIDE 120 MG: 120 CAPSULE, COATED, EXTENDED RELEASE ORAL at 08:49

## 2019-09-21 RX ADMIN — METOPROLOL SUCCINATE 100 MG: 100 TABLET, EXTENDED RELEASE ORAL at 21:53

## 2019-09-21 RX ADMIN — CIPROFLOXACIN HYDROCHLORIDE 500 MG: 500 TABLET, FILM COATED ORAL at 21:55

## 2019-09-21 RX ADMIN — VENLAFAXINE HYDROCHLORIDE 150 MG: 150 CAPSULE, EXTENDED RELEASE ORAL at 08:50

## 2019-09-21 RX ADMIN — Medication 10 ML: at 05:48

## 2019-09-21 RX ADMIN — HYDROCHLOROTHIAZIDE 12.5 MG: 25 TABLET ORAL at 08:49

## 2019-09-21 RX ADMIN — BUSPIRONE HYDROCHLORIDE 5 MG: 5 TABLET ORAL at 21:53

## 2019-09-21 RX ADMIN — LOPERAMIDE HYDROCHLORIDE 2 MG: 2 CAPSULE ORAL at 08:50

## 2019-09-21 RX ADMIN — HEPARIN SODIUM 5000 UNITS: 5000 INJECTION INTRAVENOUS; SUBCUTANEOUS at 23:30

## 2019-09-21 RX ADMIN — HALOPERIDOL LACTATE 2 MG: 5 INJECTION, SOLUTION INTRAMUSCULAR at 17:30

## 2019-09-21 RX ADMIN — LISINOPRIL 10 MG: 5 TABLET ORAL at 08:50

## 2019-09-21 RX ADMIN — METRONIDAZOLE 500 MG: 500 TABLET, FILM COATED ORAL at 05:48

## 2019-09-21 RX ADMIN — BUSPIRONE HYDROCHLORIDE 5 MG: 5 TABLET ORAL at 08:50

## 2019-09-21 RX ADMIN — Medication 10 ML: at 22:00

## 2019-09-21 RX ADMIN — MIRTAZAPINE 15 MG: 15 TABLET, FILM COATED ORAL at 21:53

## 2019-09-21 NOTE — PROGRESS NOTES
Hospitalist Progress Note    2019  Admit Date: 2019 12:57 PM   NAME: Airam Arenas   :  1930   MRN:  [id number]   Attending: Elvira Loaiza MD  PCP:  Yecenia Rogers MD    SUBJECTIVE:   Bettie Lucio is an 81 yo F admitted  with pan-colitis. Seen with her DIL and caregiver at bedside. She is unable to provide reliable history due to dementia. Her caregiver states she seems to be in less pain today and is not yelling in pain as much. - seen with Davida Dutta, caregiver, at bedside. Ms. Jodi Stratton complains of slight nausea. Denies abdominal pain. Per RN and her caregiver, stool output has slowed. - reports she is feeling okay but has not woken up yet. Transferred to ICU yesterday for a fib with RVR. Cardizem gtt at 2.5 mg/hr. Had 2 loose BMs overnight per staff.  - reports she is doing okay. Had a few loose stools over last 24 hours and RN states it seems to be related to dairy products (worsened after ice cream). Heart rate still uncontrolled and BP increasing.  - heart rate controlled and bp better some. Still having diarrhea. She denies complaints. Per staff, family not available for patient discharge until Monday, .       Review of Systems negative with exception of pertinent positives noted above  PHYSICAL EXAM     Visit Vitals  /83   Pulse 91   Temp 98.4 °F (36.9 °C)   Resp 16   Ht 5' 4\" (1.626 m)   Wt 58.1 kg (128 lb 1.6 oz)   SpO2 100%   BMI 21.99 kg/m²      Temp (24hrs), Av.3 °F (36.8 °C), Min:98 °F (36.7 °C), Max:98.4 °F (36.9 °C)    Patient Vitals for the past 24 hrs:   Temp Pulse Resp BP SpO2   19 0900  91 16 155/83 100 %   19 0849  84  (!) 158/96    19 0800 98.4 °F (36.9 °C) 88 28 (!) 158/96 98 %   19 0721 98.4 °F (36.9 °C)       19 0700  77 15 149/81 97 %   19 0600    163/81 97 %   19 0500  71 13 132/63 97 %   19 0400  69 11 107/61 96 %   19 0300 98.1 °F (36.7 °C) 66 10 146/69 96 %   09/21/19 0200  72 15 124/60 96 %   09/20/19 2324 98.4 °F (36.9 °C) 93 30 140/68 95 %   09/20/19 2135  95 27 153/82 93 %   09/20/19 2123  (!) 106 25 (!) 174/111 96 %   09/20/19 2000  86 30 157/80 100 %   09/20/19 1900 98 °F (36.7 °C) 81 (!) 33 147/84 99 %   09/20/19 1800  73 26 145/76 92 %   09/20/19 1712  87 17 (!) 177/91 99 %   09/20/19 1700  84 25 (!) 172/115 100 %   09/20/19 1600 98.4 °F (36.9 °C) 75 22 176/66 100 %   09/20/19 1500  80 22 152/77 97 %   09/20/19 1423  81 23  90 %   09/20/19 1141  75  173/75    09/20/19 1130  83 21  100 %   09/20/19 1100  77 26 173/75 100 %   09/20/19 1000  (!) 57 20 169/86 97 %       Oxygen Therapy  O2 Sat (%): 100 % (09/21/19 0900)  Pulse via Oximetry: 83 beats per minute (09/21/19 0900)  O2 Device: Room air (09/21/19 0300)  O2 Flow Rate (L/min): 2 l/min (09/20/19 1600)    Intake/Output Summary (Last 24 hours) at 9/21/2019 0927  Last data filed at 9/20/2019 1600  Gross per 24 hour   Intake 240 ml   Output    Net 240 ml      General: No acute distress, elderly  Lungs:  CTA Bilaterally.    Heart:  Irregular rhythm, tachycardic  Abdomen: Soft, no tenderness, + bowel sounds, rectal tube in place  Extremities: No cyanosis, clubbing or edema  Neurologic:  No focal deficits    Recent Results (from the past 24 hour(s))   METABOLIC PANEL, BASIC    Collection Time: 09/21/19  3:15 AM   Result Value Ref Range    Sodium 139 136 - 145 mmol/L    Potassium 3.8 3.5 - 5.1 mmol/L    Chloride 110 (H) 98 - 107 mmol/L    CO2 24 21 - 32 mmol/L    Anion gap 5 (L) 7 - 16 mmol/L    Glucose 102 (H) 65 - 100 mg/dL    BUN 17 8 - 23 MG/DL    Creatinine 0.93 0.6 - 1.0 MG/DL    GFR est AA >60 >60 ml/min/1.73m2    GFR est non-AA >60 >60 ml/min/1.73m2    Calcium 8.8 8.3 - 10.4 MG/DL     Results     Procedure Component Value Units Date/Time    CULTURE, BLOOD [[id number]] Collected:  09/16/19 1710    Order Status:  Completed Specimen:  Blood Updated:  09/21/19 0814     Special Requests: --        LEFT  Antecubital       Culture result: NO GROWTH 5 DAYS       CULTURE, BLOOD [[id number]] Collected:  09/16/19 1710    Order Status:  Completed Specimen:  Blood Updated:  09/21/19 0814     Special Requests: --        RIGHT  Antecubital       Culture result: NO GROWTH 5 DAYS       C. DIFFICILE AG & TOXIN A/B [[id number]] Collected:  09/16/19 1607    Order Status:  Completed Specimen:  Stool Updated:  09/17/19 1213     [address] ANTIGEN       C. DIFFICILE G[facility] ANTIGEN-NEGATIVE           C. difficile toxin       C. DIFFICILE TOXIN-NEGATIVE           PCR Reflex NOT APPLICABLE        INTERPRETATION       NEGATIVE FOR TOXIGENIC C. DIFFICILE           Clinical Consideration       NEGATIVE FOR TOXIGENIC C. DIFFICILE          CULTURE, URINE [[id number]] Collected:  09/16/19 1446    Order Status:  Completed Specimen:  Urine from Clean catch Updated:  09/19/19 0638     Special Requests: NO SPECIAL REQUESTS        Culture result:       <10,000  COLONIES/mL  NORMAL SKIN MARY ISOLATED          Imaging:   XR CHEST SNGL V   Final Result   IMPRESSION: Probably normal portable chest               CT ABD PELV WO CONT   Final Result   IMPRESSION:  Mural thickening with stranding densities of the entire colon. This   could be ischemic or inflammatory. C. difficile should be considered.                ASSESSMENT      Hospital Problems as of 9/21/2019 Never Reviewed          Codes Class Noted - Resolved POA    Paroxysmal atrial fibrillation ([facility] ICD-10-CM: I48.0  ICD-9-CM: 427.31  9/19/2019 - Present Yes        Atrial fibrillation with RVR ([facility] ICD-10-CM: I48.91  ICD-9-CM: 427.31  9/19/2019 - Present No        Depression with anxiety ICD-10-CM: F41.8  ICD-9-CM: 300.4  9/19/2019 - Present Yes        * (Principal) Colitis ICD-10-CM: K52.9  ICD-9-CM: 558.9  9/16/2019 - Present Yes        MONICA (acute kidney injury) ([facility] ICD-10-CM: N17.9  ICD-9-CM: 584.9  9/16/2019 - Present Yes        HTN (hypertension) ICD-10-CM: I10  ICD-9-CM: 401.9  9/16/2019 - Present Yes            Plan:  · Colitis  · Continue PO cipro/flagyl (treat for 10-14 days) day 6  · Seen by GI 9/18 and recommend for transition to PO abx and advance diet to low residue  · Outpatient GI follow up  · Prn immodium  · Re-discussed continued diarrhea with Dr. Stefan Bennett (GI) and the fact this is recurrent colitis (last episode at [facility] in 3/2019). He feels still infectious and should clear. Anticipate some diarrhea for several more days. · MONICA  · Resolved    · A fib  · Went back into a fib with RVR 9/18  · Rate controlled on toprol 100 mg and cardizem   · No treatment dose anticoagulation due to history of falls and active colitis    · HTN  · Well controlled on current therapy- continue    · Depression with anxiety  · Continue home dose venlafaxine and mirtazapine  · Buspar added 9/18 for suspected vocal cord dysfunction    · UTI removed from problem list as urine cx negative. · Continue home dose norco    Transfer to remote tele.     Dispo- home health on discharge on 9/23 (family not available until then)      DVT Prophylaxis: SCDs    Signed By: Daniel Acuna MD     September 21, 2019

## 2019-09-21 NOTE — PROGRESS NOTES
Problem: Mobility Impaired (Adult and Pediatric)  Goal: *Acute Goals and Plan of Care (Insert Text)  Description  DISCHARGE GOALS:  (1.)Ms. Tate Douglas will move from supine to sit and sit to supine  in bed with SUPERVISION within 5 treatment day(s). (2.)Ms. Tate Douglas will transfer from bed to chair and chair to bed with SUPERVISION using the least restrictive device within 5 treatment day(s). (3.)Ms. Montgomery will ambulate with SUPERVISION for 50 feet with the least restrictive device within 5 treatment day(s). (4.)Ms. Montgomery will ambulate up/down 1 step with a hand hold assist within 5 treatment days. ________________________________________________________________________________________________     Outcome: Progressing Towards Goal     PHYSICAL THERAPY: Daily Note and PM 9/21/2019  INPATIENT: PT Visit Days : 4  [place] / [place] / Product Type: Medicare /       NAME/AGE/GENDER: Bernadette Levi is a 80 y.o. female   PRIMARY DIAGNOSIS: Colitis [K52.9]  UTI (urinary tract infection) [N39.0]  MONICA (acute kidney injury) ([facility] [N17.9] Colitis   Colitis         ICD-10: Treatment Diagnosis:    · Generalized Muscle Weakness (M62.81)  · Difficulty in walking, Not elsewhere classified (R26.2)   Precaution/Allergies:  Patient has no known allergies. ASSESSMENT:     Ms. Tate Douglas presents supine on contact and in the ICU. Having some heart rate elevation and had some cardizem. Heart rate better this morning and RN said it was OK to work with pt, he did mention she is also having some diarrhea. On entry pt stating she is not feeling well today and she did not want to get up. She did agree when asked to work on some exercises with therapy. She participate well with exercises. Asked her again about getting out of bed after exercise and she still did not want to. Hope to progress at next therapy session as pt feels better.    9/20 am supine upon arrival.  Performs exercises with verbal cues and demonstration. Performs bed mobility as follows: supine>EOB with Min A. Sit><Stand x 3 and walk 10 ft to the recliner. Left in recliner with call light near with sitting present. 9/21 confused this afternoon, but did work with therapy. Therapist and pt did exercises together and work on bed mobility from L<>R x 3 for reposition in bed. Left in supine with call light near and alarm on. This section established at most recent assessment   PROBLEM LIST (Impairments causing functional limitations):  1. Decreased Strength  2. Decreased ADL/Functional Activities  3. Decreased Transfer Abilities  4. Decreased Ambulation Ability/Technique  5. Decreased Balance  6. Decreased Activity Tolerance   INTERVENTIONS PLANNED: (Benefits and precautions of physical therapy have been discussed with the patient.)  1. Balance Exercise  2. Bed Mobility  3. Family Education  4. Gait Training  5. Home Exercise Program (HEP)  6. Therapeutic Activites  7. Therapeutic Exercise/Strengthening     TREATMENT PLAN: Frequency/Duration: daily for duration of hospital stay  Rehabilitation Potential For Stated Goals: Good     REHAB RECOMMENDATIONS (at time of discharge pending progress):    Placement: It is my opinion, based on this patient's performance to date, that Ms. Montgomery may benefit from [address] after discharge due to the functional deficits listed above that are likely to improve with skilled rehabilitation because he/she has an inability to tolerate transportation to an outpatient setting as evidenced by increased weakness and needed assist for mobility . Equipment:    None at this time              HISTORY:   History of Present Injury/Illness (Reason for Referral):  Pt not a good historian. No family at bedside .      Later on after admission was able to speak to son-Kourtney Montgomery and daughter    in law-  According to them pt lives alone, has care giver who comes half a day(care giver had viral illness in Thursday- was off for few days and came back to work today). Pt has diagnosed with afib after her hip surgery in feb- presently not on any medications- says pt is on coreg and metoprolol(? Why). Also on medications for depression and htn. Chronic pain medications for hip pain. PCP has been changing medications for bp as it was uncontrolled. Pt was ok this am- started having diarrhea,vomiting and abdominal pain- hence care giver called 911. Pt main c/o was abdominal pain at the time of my examination- says pain medications didn't help. Presently has rectal tube. Pt otherwise didn't c/o headache or dizziness or chest pain or sob or rectal bleeding or hemoptysis. ua nitrite positive  Creatinine  1.64,gfr 31,lactic acid 2.64  C.diff pending  Ct abd pelvis with out contrast-  Mural thickening with stranding densities of the entire colon. This  could be ischemic or inflammatory. C. difficile should be considered. Pt will be admitted for colitis ischemic vs inflammatory. Past Medical History/Comorbidities:   Ms. Allan Sawyer  has a past medical history of ADHD, Arthritis, and Hypertension. Ms. Allan Sawyer  has no past surgical history on file. Social History/Living Environment:   Home Environment: Private residence  # Steps to Enter: 2  Wheelchair Ramp: Yes  One/Two Story Residence: One story  Living Alone: Yes  Support Systems: Child(nura), Home care staff  Patient Expects to be Discharged to[de-identified] Private residence  Current DME Used/Available at Home: None  Tub or Shower Type: Shower  Prior Level of Function/Work/Activity:  Ambulating with walker.      Number of Personal Factors/Comorbidities that affect the Plan of Care: 1-2: MODERATE COMPLEXITY   EXAMINATION:   Most Recent Physical Functioning:   Gross Assessment:                  Posture:     Balance:    Bed Mobility:  Rolling: Minimum assistance(L<>R x 4 to get repoistion in bed)  Supine to Sit: Minimum assistance  Sit to Supine: Minimum assistance  Scooting: Minimum assistance  Wheelchair Mobility:     Transfers:  Duration: 25 Minutes  Gait:            Body Structures Involved:  1. Muscles Body Functions Affected:  1. Movement Related Activities and Participation Affected:  1. Mobility   Number of elements that affect the Plan of Care: 3: MODERATE COMPLEXITY   CLINICAL PRESENTATION:   Presentation: Stable and uncomplicated: LOW COMPLEXITY   CLINICAL DECISION MAKIN[address] [place] [address] AM-PAC 6 Clicks   Basic Mobility Inpatient Short Form  How much difficulty does the patient currently have. .. Unable A Lot A Little None   1. Turning over in bed (including adjusting bedclothes, sheets and blankets)? ? 1   ? 2   ? 3   ? 4   2. Sitting down on and standing up from a chair with arms ( e.g., wheelchair, bedside commode, etc.)   ? 1   ? 2   ? 3   ? 4   3. Moving from lying on back to sitting on the side of the bed?   ? 1   ? 2   ? 3   ? 4   How much help from another person does the patient currently need. .. Total A Lot A Little None   4. Moving to and from a bed to a chair (including a wheelchair)? ? 1   ? 2   ? 3   ? 4   5. Need to walk in hospital room? ? 1   ? 2   ? 3   ? 4   6. Climbing 3-5 steps with a railing? ? 1   ? 2   ? 3   ? 4   © , Trustees of [address] [place] [address], under license to [employer]. All rights reserved      Score:  Initial: 17 Most Recent: X (Date: -- )    Interpretation of Tool:  Represents activities that are increasingly more difficult (i.e. Bed mobility, Transfers, Gait). Medical Necessity:     · Patient is expected to demonstrate progress in strength, balance and coordination  ·  to decrease assistance required with bed mobility, transfers, and gait. · .  Reason for Services/Other Comments:  · Patient continues to require skilled intervention due to weakness affecting bed mobility, transfers, and gait.    · .   Use of outcome tool(s) and clinical judgement create a POC that gives a: Clear prediction of patient's progress: LOW COMPLEXITY            TREATMENT:   (In addition to Assessment/Re-Assessment sessions the following treatments were rendered)   Pre-treatment Symptoms/Complaints:  Patient agreeable to work with therapy  Pain: Initial:   Pain Intensity 1: 0  Post Session:  0     Therapeutic Exercise: (0 Minutes):  Exercises per grid below to improve mobility and strength. Required minimal verbal and manual cues to promote proper body alignment and promote proper body posture. Progressed repetitions as indicated. Therapeutic Activity: (  25 Minutes ):  Therapeutic activities including Chair transfers along with bed mobility with Min A. Date:  9/16/19 Date:  9/20   Date:     Activity/Exercise Parameters Parameters Parameters   Ankle pumps 15 12    Quad sets 15 12    Gluteal sets 15 12    Heel slides 15 12    Straight leg raisses 15 12    Hip abd/adduction 15 12                  Braces/Orthotics/Lines/Etc:   · IV  · pure wick, oxygen, telemetry monitoring  · Treatment/Session Assessment:    · Response to Treatment:  Patient participated with, but coonfused  · Interdisciplinary Collaboration:   o Registered Nurse  · After treatment position/precautions:   o Supine in bed  o Bed/Chair-wheels locked  o Call light within reach   · Compliance with Program/Exercises: Will assess as treatment progresses  · Recommendations/Intent for next treatment session: \"Next visit will focus on reduction in assistance provided\".   Total Treatment Duration:  PT Patient Time In/Time Out  Time In: 9300  Time Out: [id number] [address] Kenard Habermann, PTA

## 2019-09-21 NOTE — PROGRESS NOTES
Late entry due to hands on patient care. Patient confused and agitated. Pulled peripheral I.V. Out earlier. Mittens place. Atrial fib on cardiac monitor. Heart rate 81. Tachypnea at times. Oxygen saturation 95 % on room air. Head of bed elevated. Bed in low/lock position. Bed alarm on.

## 2019-09-21 NOTE — PROGRESS NOTES
Patient agitated and hitting at staff. Trying to get out of bed. Bed alarm on. Mittens were place earlier and made patient more agitated. Mittens removed.  called. Orders received. Bed in low/lock position. Bed alarm on.

## 2019-09-22 LAB
ANION GAP SERPL CALC-SCNC: 6 MMOL/L (ref 7–16)
BUN SERPL-MCNC: 16 MG/DL (ref 8–23)
CALCIUM SERPL-MCNC: 8.9 MG/DL (ref 8.3–10.4)
CHLORIDE SERPL-SCNC: 107 MMOL/L (ref 98–107)
CO2 SERPL-SCNC: 25 MMOL/L (ref 21–32)
CREAT SERPL-MCNC: 1.02 MG/DL (ref 0.6–1)
GLUCOSE SERPL-MCNC: 99 MG/DL (ref 65–100)
POTASSIUM SERPL-SCNC: 3.7 MMOL/L (ref 3.5–5.1)
SODIUM SERPL-SCNC: 138 MMOL/L (ref 136–145)

## 2019-09-22 PROCEDURE — 65660000000 HC RM CCU STEPDOWN

## 2019-09-22 PROCEDURE — 80048 BASIC METABOLIC PNL TOTAL CA: CPT

## 2019-09-22 PROCEDURE — 36415 COLL VENOUS BLD VENIPUNCTURE: CPT

## 2019-09-22 PROCEDURE — 74011250637 HC RX REV CODE- 250/637: Performed by: INTERNAL MEDICINE

## 2019-09-22 PROCEDURE — 97116 GAIT TRAINING THERAPY: CPT

## 2019-09-22 PROCEDURE — 97110 THERAPEUTIC EXERCISES: CPT

## 2019-09-22 PROCEDURE — 74011250636 HC RX REV CODE- 250/636: Performed by: FAMILY MEDICINE

## 2019-09-22 RX ADMIN — MENTHOL, METHYL SALICYLATE: 10; 15 CREAM TOPICAL at 20:47

## 2019-09-22 RX ADMIN — HEPARIN SODIUM 5000 UNITS: 5000 INJECTION INTRAVENOUS; SUBCUTANEOUS at 08:00

## 2019-09-22 RX ADMIN — CIPROFLOXACIN HYDROCHLORIDE 500 MG: 500 TABLET, FILM COATED ORAL at 20:42

## 2019-09-22 RX ADMIN — MENTHOL, METHYL SALICYLATE: 10; 15 CREAM TOPICAL at 13:49

## 2019-09-22 RX ADMIN — DILTIAZEM HYDROCHLORIDE 120 MG: 120 CAPSULE, COATED, EXTENDED RELEASE ORAL at 08:49

## 2019-09-22 RX ADMIN — CIPROFLOXACIN HYDROCHLORIDE 500 MG: 500 TABLET, FILM COATED ORAL at 08:50

## 2019-09-22 RX ADMIN — BUSPIRONE HYDROCHLORIDE 5 MG: 5 TABLET ORAL at 08:50

## 2019-09-22 RX ADMIN — METRONIDAZOLE 500 MG: 500 TABLET, FILM COATED ORAL at 05:39

## 2019-09-22 RX ADMIN — Medication 5 ML: at 20:43

## 2019-09-22 RX ADMIN — QUETIAPINE FUMARATE 25 MG: 25 TABLET ORAL at 20:43

## 2019-09-22 RX ADMIN — MIRTAZAPINE 15 MG: 15 TABLET, FILM COATED ORAL at 20:43

## 2019-09-22 RX ADMIN — Medication 5 ML: at 05:39

## 2019-09-22 RX ADMIN — BUSPIRONE HYDROCHLORIDE 5 MG: 5 TABLET ORAL at 20:42

## 2019-09-22 RX ADMIN — HEPARIN SODIUM 5000 UNITS: 5000 INJECTION INTRAVENOUS; SUBCUTANEOUS at 23:39

## 2019-09-22 RX ADMIN — HYDROCODONE BITARTRATE AND ACETAMINOPHEN 1 TABLET: 5; 325 TABLET ORAL at 02:10

## 2019-09-22 RX ADMIN — LISINOPRIL 10 MG: 5 TABLET ORAL at 08:49

## 2019-09-22 RX ADMIN — METOPROLOL SUCCINATE 100 MG: 100 TABLET, EXTENDED RELEASE ORAL at 20:42

## 2019-09-22 RX ADMIN — HYDROCODONE BITARTRATE AND ACETAMINOPHEN 1 TABLET: 5; 325 TABLET ORAL at 22:10

## 2019-09-22 RX ADMIN — HYDROCHLOROTHIAZIDE 12.5 MG: 25 TABLET ORAL at 08:50

## 2019-09-22 RX ADMIN — VENLAFAXINE HYDROCHLORIDE 150 MG: 150 CAPSULE, EXTENDED RELEASE ORAL at 08:49

## 2019-09-22 NOTE — PROGRESS NOTES
Problem: Falls - Risk of  Goal: *Absence of Falls  Description  Document Jasmine Girt Fall Risk and appropriate interventions in the flowsheet. Outcome: Progressing Towards Goal  Note:   Fall Risk Interventions:  Mobility Interventions: Assess mobility with egress test, Bed/chair exit alarm, Patient to call before getting OOB, PT Consult for mobility concerns    Mentation Interventions: Adequate sleep, hydration, pain control, Bed/chair exit alarm, Door open when patient unattended, Room close to nurse's station    Medication Interventions: Assess postural VS orthostatic hypotension, Bed/chair exit alarm, Patient to call before getting OOB, Teach patient to arise slowly    Elimination Interventions: Bed/chair exit alarm    History of Falls Interventions: Bed/chair exit alarm, Door open when patient unattended, Investigate reason for fall, Room close to nurse's station         Problem: Pressure Injury - Risk of  Goal: *Prevention of pressure injury  Description  Document Pietro Scale and appropriate interventions in the flowsheet.   Outcome: Progressing Towards Goal  Note:   Pressure Injury Interventions:  Sensory Interventions: Assess changes in LOC, Assess need for specialty bed, Float heels, Keep linens dry and wrinkle-free    Moisture Interventions: Absorbent underpads, Check for incontinence Q2 hours and as needed    Activity Interventions: Assess need for specialty bed    Mobility Interventions: Assess need for specialty bed, Float heels, HOB 30 degrees or less, PT/OT evaluation    Nutrition Interventions: Document food/fluid/supplement intake, Offer support with meals,snacks and hydration    Friction and Shear Interventions: Apply protective barrier, creams and emollients, Feet elevated on foot rest, Foam dressings/transparent film/skin sealants, HOB 30 degrees or less                Problem: Patient Education: Go to Patient Education Activity  Goal: Patient/Family Education  Outcome: Progressing Towards Goal Problem: Nutrition Deficit  Goal: *Optimize nutritional status  Outcome: Progressing Towards Goal

## 2019-09-22 NOTE — PROGRESS NOTES
TRANSFER - OUT REPORT:    Verbal report given to [facility] RN on Iza Lanes  being transferred to  routine progression of care       Report consisted of patients Situation, Background, Assessment and   Recommendations(SBAR). Information from the following report(s) SBAR, ED Summary, Intake/Output, MAR and Cardiac Rhythm Atrial Fib was reviewed with the receiving nurse. Lines:   Peripheral IV 09/21/19 Left;Upper Arm (Active)        Opportunity for questions and clarification was provided.       Patient transported with:   Registered Nurse  Tech

## 2019-09-22 NOTE — PROGRESS NOTES
09/21/19 2230   Dual Skin Pressure Injury Assessment   Dual Skin Pressure Injury Assessment WDL   Second Care Provider (Based on [address] [place] [address]) Yoavni Dixon RN   Skin Integumentary   Skin Integumentary (WDL) X   Skin Color Ecchymosis (comment)  (scattered on both upper and lower extremities.)   Skin Condition/Temp Warm;Dry;Fragile   Skin Integrity Intact   Turgor Epidermis thin w/ loss of subcut tissue   Hair Growth Present    Pressure  Injury Documentation No Pressure Injury Noted-Pressure Ulcer Prevention Initiated

## 2019-09-22 NOTE — PROGRESS NOTES
Problem: Falls - Risk of  Goal: *Absence of Falls  Description  Document Karel Aleman Fall Risk and appropriate interventions in the flowsheet. Outcome: Progressing Towards Goal  Note:   Fall Risk Interventions:  Mobility Interventions: Bed/chair exit alarm, Patient to call before getting OOB, PT Consult for assist device competence, Utilize walker, cane, or other assistive device    Mentation Interventions: Adequate sleep, hydration, pain control, Bed/chair exit alarm, Door open when patient unattended, More frequent rounding, Reorient patient, Room close to nurse's station, Toileting rounds, Update white board    Medication Interventions: Bed/chair exit alarm, Patient to call before getting OOB, Teach patient to arise slowly    Elimination Interventions: Bed/chair exit alarm, Call light in reach, Patient to call for help with toileting needs, Toileting schedule/hourly rounds    History of Falls Interventions: Bed/chair exit alarm, Door open when patient unattended, Investigate reason for fall, Room close to nurse's station         Problem: Pressure Injury - Risk of  Goal: *Prevention of pressure injury  Description  Document Pietro Scale and appropriate interventions in the flowsheet.   Outcome: Progressing Towards Goal  Note:   Pressure Injury Interventions:  Sensory Interventions: Assess changes in LOC, Check visual cues for pain    Moisture Interventions: Absorbent underpads, Check for incontinence Q2 hours and as needed    Activity Interventions: PT/OT evaluation, Increase time out of bed    Mobility Interventions: Assess need for specialty bed, PT/OT evaluation    Nutrition Interventions: Document food/fluid/supplement intake, Offer support with meals,snacks and hydration    Friction and Shear Interventions: Lift sheet                Problem: Nutrition Deficit  Goal: *Optimize nutritional status  Outcome: Progressing Towards Goal

## 2019-09-22 NOTE — PROGRESS NOTES
Received pt from [facility].  in stable condition. Pt in bed resting quietly. Resp even & unlabored on room air; no acute signs of distress noted. Bed low & locked; call light in reach; no needs voiced.

## 2019-09-22 NOTE — PROGRESS NOTES
Hospitalist Progress Note    2019  Admit Date: 2019 12:57 PM   NAME: Carlos Peralta   :  1930   MRN:  [id number]   Attending: Ольга Cunningham MD  PCP:  June John MD    SUBJECTIVE:   Hannah Sy is an 81 yo F admitted  with pan-colitis. Her abdominal pain and diarrhea slowly improving with cipro/flagyl and GI recommended to finish out abx with outpatient follow up. Hospital course complicated by a fib with RVR, and she was treated in ICU on cardizem gtt with meds titrated for several days with improvement. She has dementia and owning has been an issue. Started on seroquel evening of .    - reports R foot pain (hurts on dorsal aspect of foot). Has arthritic nodule that is slightly erythematous and tender. She denies other concerns. Oriented to self and situation.       Review of Systems negative with exception of pertinent positives noted above  PHYSICAL EXAM     Visit Vitals  /82 (BP 1 Location: Right arm, BP Patient Position: At rest;Supine)   Pulse 84   Temp 99.7 °F (37.6 °C)   Resp 18   Ht 5' 4\" (1.626 m)   Wt 52.6 kg (116 lb)   SpO2 96%   BMI 19.91 kg/m²      Temp (24hrs), Av.8 °F (37.1 °C), Min:98.2 °F (36.8 °C), Max:99.7 °F (37.6 °C)    Patient Vitals for the past 24 hrs:   Temp Pulse Resp BP SpO2   19 0802 99.7 °F (37.6 °C) 84 18 140/82 96 %   19 0322 98.8 °F (37.1 °C) 74 16 106/69 96 %   19 2241 98.5 °F (36.9 °C) 99 18 109/71 99 %   19 2125  98      19 2119  94      19 2100  86  141/68    19 2000  (!) 106  149/85    19 1900 98.2 °F (36.8 °C) (!) 118 20 145/75 97 %   19 1800  (!) 102 25 147/81    19 1700  87 22 (!) 142/94 98 %   19 1657  92  (!) 157/95    19 1500  72 28 120/71 98 %   19 1400  70 22 115/68 97 %   19 1332    112/67    19 1100  96 (!) 45 155/90 98 %   19 1000  83 20 (!) 160/94 97 %       Oxygen Therapy  O2 Sat (%): 96 % (09/22/19 0802)  Pulse via Oximetry: 88 beats per minute (09/21/19 1700)  O2 Device: Room air (09/21/19 1900)  O2 Flow Rate (L/min): 2 l/min (09/20/19 1600)  No intake or output data in the 24 hours ending 09/22/19 0951   General: No acute distress, elderly  Lungs:  CTA Bilaterally.    Heart:  Irregular rhythm, tachycardic  Abdomen: Soft, no tenderness, + bowel sounds, rectal tube in place  Extremities: No cyanosis, clubbing or edema, arthritic nodules on dorsum of both feet with slight erythema on R and tenderness  Neurologic:  No focal deficits    Recent Results (from the past 24 hour(s))   METABOLIC PANEL, BASIC    Collection Time: 09/22/19  4:43 AM   Result Value Ref Range    Sodium 138 136 - 145 mmol/L    Potassium 3.7 3.5 - 5.1 mmol/L    Chloride 107 98 - 107 mmol/L    CO2 25 21 - 32 mmol/L    Anion gap 6 (L) 7 - 16 mmol/L    Glucose 99 65 - 100 mg/dL    BUN 16 8 - 23 MG/DL    Creatinine 1.02 (H) 0.6 - 1.0 MG/DL    GFR est AA >60 >60 ml/min/1.73m2    GFR est non-AA 54 (L) >60 ml/min/1.73m2    Calcium 8.9 8.3 - 10.4 MG/DL     Results     Procedure Component Value Units Date/Time    CULTURE, BLOOD [[id number]] Collected:  09/16/19 1710    Order Status:  Completed Specimen:  Blood Updated:  09/21/19 0814     Special Requests: --        LEFT  Antecubital       Culture result: NO GROWTH 5 DAYS       CULTURE, BLOOD [[id number]] Collected:  09/16/19 1710    Order Status:  Completed Specimen:  Blood Updated:  09/21/19 0814     Special Requests: --        RIGHT  Antecubital       Culture result: NO GROWTH 5 DAYS       C. DIFFICILE AG & TOXIN A/B [[id number]] Collected:  09/16/19 1607    Order Status:  Completed Specimen:  Stool Updated:  09/17/19 1212     [address] ANTIGEN       C. DIFFICILE GDH ANTIGEN-NEGATIVE           C. difficile toxin       C. DIFFICILE TOXIN-NEGATIVE           PCR Reflex NOT APPLICABLE        INTERPRETATION       NEGATIVE FOR TOXIGENIC C. DIFFICILE           Clinical Consideration       NEGATIVE FOR TOXIGENIC C. DIFFICILE          CULTURE, URINE [[id number]] Collected:  09/16/19 1446    Order Status:  Completed Specimen:  Urine from Clean catch Updated:  09/19/19 3593     Special Requests: NO SPECIAL REQUESTS        Culture result:       <10,000  COLONIES/mL  NORMAL SKIN MARY ISOLATED          Imaging:   XR CHEST SNGL V   Final Result   IMPRESSION: Probably normal portable chest               CT ABD PELV WO CONT   Final Result   IMPRESSION:  Mural thickening with stranding densities of the entire colon. This   could be ischemic or inflammatory. C. difficile should be considered. ASSESSMENT      Hospital Problems as of 9/22/2019 Never Reviewed          Codes Class Noted - Resolved POA    Paroxysmal atrial fibrillation ([facility] ICD-10-CM: I48.0  ICD-9-CM: 427.31  9/19/2019 - Present Yes        Atrial fibrillation with RVR ([facility] ICD-10-CM: I48.91  ICD-9-CM: 427.31  9/19/2019 - Present No        Depression with anxiety ICD-10-CM: F41.8  ICD-9-CM: 300.4  9/19/2019 - Present Yes        * (Principal) Colitis ICD-10-CM: K52.9  ICD-9-CM: 558.9  9/16/2019 - Present Yes        MONICA (acute kidney injury) ([facility] ICD-10-CM: N17.9  ICD-9-CM: 584.9  9/16/2019 - Present Yes        HTN (hypertension) ICD-10-CM: I10  ICD-9-CM: 401.9  9/16/2019 - Present Yes            Plan:  · Colitis  · Continue PO cipro/flagyl (treat for 10-14 days) day 7  · Seen by GI 9/18 and recommend for transition to PO abx and advance diet to low residue  · Outpatient GI follow up  · Prn immodium  · Re-discussed continued diarrhea with Dr. Krystle Mcbride (GI) on 9/21 and the fact this is recurrent colitis (last episode at [address] in 3/2019). He feels still infectious and should clear. Anticipate some diarrhea for several more days.     · MONICA  · Resolved    · A fib  · Went back into a fib with RVR 9/18  · Continue toprol 100 mg and cardizem   · No treatment dose anticoagulation due to history of falls and active colitis    · HTN  · Well controlled on current therapy- continue    · Depression with anxiety  · Continue home dose venlafaxine and mirtazapine  · Buspar added 9/18 for suspected vocal cord dysfunction    · UTI removed from problem list as urine cx negative. · R foot pain  · Suspect arthritis and possibly bumped foot when she had some agitation  · Will try Bengay cream  · Continue home dose norco    Transfer to remote [facility].     Dispo- home health on discharge on 9/23 (family not available until then)      DVT Prophylaxis: SCDs    Signed By: Luis Manuel Vázquez MD     September 22, 2019

## 2019-09-22 NOTE — PROGRESS NOTES
Patient confused and agitated. Trying to get out of bed. Reorient patient to surrounding. Patient follow some simple commands. When ask if having pain. Patient denies. Spot oxygen saturation check 97 % on room air. Head of bed elevated. Atrial fib on cardiac monitor. Heart rate 98. Bed in low/lock position. Bed alarm on.

## 2019-09-22 NOTE — PROGRESS NOTES
TRANSFER - IN REPORT:    Verbal report received from Nimo NJ(name) on [employer]  being received from ICU(unit) for routine progression of care      Report consisted of patients Situation, Background, Assessment and   Recommendations(SBAR). Information from the following report(s) SBAR, Kardex and MAR was reviewed with the receiving nurse. Opportunity for questions and clarification was provided. Assessment completed upon patients arrival to unit and care assumed.

## 2019-09-22 NOTE — PROGRESS NOTES
Problem: Mobility Impaired (Adult and Pediatric)  Goal: *Acute Goals and Plan of Care (Insert Text)  Description  DISCHARGE GOALS:  (1.)Ms. Osorio Momin will move from supine to sit and sit to supine  in bed with SUPERVISION within 5 treatment day(s). (2.)Ms. Osorio Momin will transfer from bed to chair and chair to bed with SUPERVISION using the least restrictive device within 5 treatment day(s). (3.)Ms. Mnotgomery will ambulate with SUPERVISION for 50 feet with the least restrictive device within 5 treatment day(s). (4.)Ms. Montgomery will ambulate up/down 1 step with a hand hold assist within 5 treatment days. ________________________________________________________________________________________________     Outcome: Progressing Towards Goal     PHYSICAL THERAPY: Daily Note and PM 9/22/2019  INPATIENT: PT Visit Days : 4  [place] / [place] / Product Type: Medicare /       NAME/AGE/GENDER: Christina Sanders is a 80 y.o. female   PRIMARY DIAGNOSIS: Colitis [K52.9]  UTI (urinary tract infection) [N39.0]  MONICA (acute kidney injury) ([facility] [N17.9] Colitis   Colitis         ICD-10: Treatment Diagnosis:    · Generalized Muscle Weakness (M62.81)  · Difficulty in walking, Not elsewhere classified (R26.2)   Precaution/Allergies:  Patient has no known allergies. ASSESSMENT:     Ms. Osorio Momin presents supine on contact and in the ICU. Having some heart rate elevation and had some cardizem. Heart rate better this morning and RN said it was OK to work with pt, he did mention she is also having some diarrhea. On entry pt stating she is not feeling well today and she did not want to get up. She did agree when asked to work on some exercises with therapy. She participate well with exercises. Asked her again about getting out of bed after exercise and she still did not want to. Hope to progress at next therapy session as pt feels better.    9/20 am supine upon arrival.  Performs exercises with verbal cues and demonstration. Performs bed mobility as follows: supine>EOB with Min A. Sit><Stand x 3 and walk 10 ft to the recliner. Left in recliner with call light near with sitting present. 9/21 confused this afternoon, but did work with therapy. Therapist and pt did exercises together and work on bed mobility from L<>R x 3 for reposition in bed. Left in supine with call light near and alarm on.  9/22--Pt states she is not sure what she can do today since her foot is hurting. PT encouraged pt to try and to tell PT what activities affect her foot. Pt performed exercises in supine. Pt performed supine to sit. Pt stated she thought she had diarrhea. PT encouraged pt to try to get to bedside commode, but pt thought she had already gone in her brief. Pt performed sit to stand and took steps to head of bed. Pt returned supine. Pt notified RN of request for pain meds (for right foot). RN notified of pt's need for brief change. This section established at most recent assessment   PROBLEM LIST (Impairments causing functional limitations):  1. Decreased Strength  2. Decreased ADL/Functional Activities  3. Decreased Transfer Abilities  4. Decreased Ambulation Ability/Technique  5. Decreased Balance  6. Decreased Activity Tolerance   INTERVENTIONS PLANNED: (Benefits and precautions of physical therapy have been discussed with the patient.)  1. Balance Exercise  2. Bed Mobility  3. Family Education  4. Gait Training  5. Home Exercise Program (HEP)  6. Therapeutic Activites  7. Therapeutic Exercise/Strengthening     TREATMENT PLAN: Frequency/Duration: daily for duration of hospital stay  Rehabilitation Potential For Stated Goals: Good     REHAB RECOMMENDATIONS (at time of discharge pending progress):    Placement: It is my opinion, based on this patient's performance to date, that Ms. Montgomery may benefit from [address] after discharge due to the functional deficits listed above that are likely to improve with skilled rehabilitation because he/she has an inability to tolerate transportation to an outpatient setting as evidenced by increased weakness and needed assist for mobility . Equipment:    None at this time              HISTORY:   History of Present Injury/Illness (Reason for Referral):  Pt not a good historian. No family at bedside . Later on after admission was able to speak to son-Kourtney Montgomery and daughter    in law-  According to them pt lives alone, has care giver who comes half a day(care giver had viral illness in Thursday- was off for few days and came back to work today). Pt has diagnosed with afib after her hip surgery in feb- presently not on any medications- says pt is on coreg and metoprolol(? Why). Also on medications for depression and htn. Chronic pain medications for hip pain. PCP has been changing medications for bp as it was uncontrolled. Pt was ok this am- started having diarrhea,vomiting and abdominal pain- hence care giver called 911. Pt main c/o was abdominal pain at the time of my examination- says pain medications didn't help. Presently has rectal tube. Pt otherwise didn't c/o headache or dizziness or chest pain or sob or rectal bleeding or hemoptysis. ua nitrite positive  Creatinine  1.64,gfr 31,lactic acid 2.64  C.diff pending  Ct abd pelvis with out contrast-  Mural thickening with stranding densities of the entire colon. This  could be ischemic or inflammatory. C. difficile should be considered. Pt will be admitted for colitis ischemic vs inflammatory. Past Medical History/Comorbidities:   Ms. Negrito Reza  has a past medical history of ADHD, Arthritis, and Hypertension. Ms. Negrito Reza  has no past surgical history on file.   Social History/Living Environment:   Home Environment: Private residence  # Steps to Enter: 2  Wheelchair Ramp: Yes  One/Two Story Residence: One story  Living Alone: Yes  Support Systems: Child(nura), Home care staff  Patient Expects to be Discharged to[de-identified] Private residence  Current DME Used/Available at Home: None  Tub or Shower Type: Shower  Prior Level of Function/Work/Activity:  Ambulating with walker. Number of Personal Factors/Comorbidities that affect the Plan of Care: 1-2: MODERATE COMPLEXITY   EXAMINATION:   Most Recent Physical Functioning:   Gross Assessment:                  Posture:     Balance:  Sitting: Intact  Standing: Pull to stand; With support Bed Mobility:  Supine to Sit: Moderate assistance  Sit to Supine: Moderate assistance  Level of Assistance: Moderate assistance  Wheelchair Mobility:     Transfers:  Sit to Stand: Moderate assistance  Stand to Sit: Moderate assistance  Gait:     Gait Abnormalities: Decreased step clearance  Distance (ft): 5 Feet (ft)(to head of bed)  Ambulation - Level of Assistance: Minimal assistance  Interventions: Manual cues; Safety awareness training      Body Structures Involved:  1. Muscles Body Functions Affected:  1. Movement Related Activities and Participation Affected:  1. Mobility   Number of elements that affect the Plan of Care: 3: MODERATE COMPLEXITY   CLINICAL PRESENTATION:   Presentation: Stable and uncomplicated: LOW COMPLEXITY   CLINICAL DECISION MAKIN [facility] [address] [address] AM-PAC 6 Clicks   Basic Mobility Inpatient Short Form  How much difficulty does the patient currently have. .. Unable A Lot A Little None   1. Turning over in bed (including adjusting bedclothes, sheets and blankets)? ? 1   ? 2   ? 3   ? 4   2. Sitting down on and standing up from a chair with arms ( e.g., wheelchair, bedside commode, etc.)   ? 1   ? 2   ? 3   ? 4   3. Moving from lying on back to sitting on the side of the bed?   ? 1   ? 2   ? 3   ? 4   How much help from another person does the patient currently need. .. Total A Lot A Little None   4. Moving to and from a bed to a chair (including a wheelchair)? ? 1   ? 2   ? 3   ? 4   5. Need to walk in hospital room?    ? 1   ? 2   ? 3   ? 4 6.  Climbing 3-5 steps with a railing? ? 1   ? 2   ? 3   ? 4   © 2007, Trustees of [facility] [employer], under license to [employer]. All rights reserved      Score:  Initial: 17 Most Recent: X (Date: -- )    Interpretation of Tool:  Represents activities that are increasingly more difficult (i.e. Bed mobility, Transfers, Gait). Medical Necessity:     · Patient is expected to demonstrate progress in strength, balance and coordination  ·  to decrease assistance required with bed mobility, transfers, and gait. · .  Reason for Services/Other Comments:  · Patient continues to require skilled intervention due to weakness affecting bed mobility, transfers, and gait. · .   Use of outcome tool(s) and clinical judgement create a POC that gives a: Clear prediction of patient's progress: LOW COMPLEXITY            TREATMENT:   (In addition to Assessment/Re-Assessment sessions the following treatments were rendered)   Pre-treatment Symptoms/Complaints:  Patient agreeable to work with therapy  Pain: Initial:   Pain Intensity 1: 5  Pain Location 1: Foot  Pain Orientation 1: Right  Post Session:  0     Therapeutic Exercise: ( 15 minutes):  Exercises per grid below to improve mobility and strength. Required minimal verbal and manual cues to promote proper body alignment and promote proper body posture. Progressed repetitions as indicated. Therapeutic Activity: (    ):  Therapeutic activities including Chair transfers along with bed mobility with Min A.  Gait Training (  8 minutes):  Gait training to improve and/or restore physical functioning as related to mobility.      Assistive Device: (HHA)  Ambulation - Level of Assistance: Minimal assistance  Distance (ft): 5 Feet (ft)(to head of bed)  Interventions: Manual cues, Safety awareness training     Date:  9/16/19 Date:  9/20   Date:  9/22   Activity/Exercise Parameters Parameters Parameters   Ankle pumps 15 12 10   Quad sets 15 12 10   Gluteal sets 15 12 10   Heel slides 15 12 10   Straight leg raisses 15 12 10   Hip abd/adduction 15 12 10                 Braces/Orthotics/Lines/Etc:   · IV  · Treatment/Session Assessment:    · Response to Treatment:  Patient appears appropriate with therapy today and is agreeable to therapy. · Interdisciplinary Collaboration:   o Physical Therapist  o Registered Nurse  · After treatment position/precautions:   o Supine in bed  o Bed/Chair-wheels locked  o Call light within reach   · Compliance with Program/Exercises: Will assess as treatment progresses  · Recommendations/Intent for next treatment session: \"Next visit will focus on reduction in assistance provided\".   Total Treatment Duration:  PT Patient Time In/Time Out  Time In: 0915  Time Out: [address] PT

## 2019-09-22 NOTE — PROGRESS NOTES
If pt discharged on 9/23; pt's family would like for pt to be discharged as early as possible; from 8-12 since they have a caregiver able to come in to help pt at home.

## 2019-09-22 NOTE — PROGRESS NOTES
Patient received sitting up in bedside chair, offers no complaints. Shift assessment completed. Assisted x1 back to bed. Patient incontinent of urine/stool. Pericare given, new brief placed. Patient repositioned in bed for comfort, will monitor.

## 2019-09-22 NOTE — PROGRESS NOTES
----- Message from Alejandra Babb sent at 5/10/2018 10:41 AM CDT -----  Contact: self/[phone]  Pt called in regards to getting a med refill appointment but she can only come on may 15 anytime. She did not want to see anyone else and wanted to be worked in.      Please advise   Admission assessment done. Pt alert an oriented to person only. Respirations even and unlabored. Abdomen soft with active bowel sounds. Scattered ecchymosis noted on both upper and lower extremities. Denies needs and pain this time. Oriented to the room. Call light in reach. Bed alarm on. Bed low and locked. Will monitor.

## 2019-09-23 ENCOUNTER — APPOINTMENT (OUTPATIENT)
Dept: GENERAL RADIOLOGY | Age: 84
DRG: 392 | End: 2019-09-23
Attending: INTERNAL MEDICINE
Payer: MEDICARE

## 2019-09-23 VITALS
TEMPERATURE: 98.5 F | DIASTOLIC BLOOD PRESSURE: 82 MMHG | SYSTOLIC BLOOD PRESSURE: 132 MMHG | HEART RATE: 81 BPM | BODY MASS INDEX: 19.81 KG/M2 | WEIGHT: 116 LBS | RESPIRATION RATE: 18 BRPM | OXYGEN SATURATION: 96 % | HEIGHT: 64 IN

## 2019-09-23 PROBLEM — M19.072 PRIMARY OSTEOARTHRITIS OF BOTH FEET: Status: ACTIVE | Noted: 2019-09-23

## 2019-09-23 PROBLEM — M19.071 PRIMARY OSTEOARTHRITIS OF BOTH FEET: Status: ACTIVE | Noted: 2019-09-23

## 2019-09-23 PROBLEM — G89.4 CHRONIC PAIN SYNDROME: Status: ACTIVE | Noted: 2019-09-23

## 2019-09-23 PROCEDURE — 73630 X-RAY EXAM OF FOOT: CPT

## 2019-09-23 PROCEDURE — 74011250637 HC RX REV CODE- 250/637: Performed by: INTERNAL MEDICINE

## 2019-09-23 PROCEDURE — 74011250636 HC RX REV CODE- 250/636: Performed by: FAMILY MEDICINE

## 2019-09-23 RX ORDER — HYDROCODONE BITARTRATE AND ACETAMINOPHEN 5; 325 MG/1; MG/1
1 TABLET ORAL
Qty: 15 TAB | Refills: 0 | Status: SHIPPED | OUTPATIENT
Start: 2019-09-23 | End: 2019-09-28

## 2019-09-23 RX ORDER — QUETIAPINE FUMARATE 25 MG/1
25 TABLET, FILM COATED ORAL EVERY EVENING
Qty: 30 TAB | Refills: 0 | Status: SHIPPED
Start: 2019-09-23

## 2019-09-23 RX ORDER — METOPROLOL SUCCINATE 100 MG/1
100 TABLET, EXTENDED RELEASE ORAL EVERY EVENING
Qty: 30 TAB | Refills: 0 | Status: SHIPPED
Start: 2019-09-23

## 2019-09-23 RX ORDER — DILTIAZEM HYDROCHLORIDE 120 MG/1
120 CAPSULE, COATED, EXTENDED RELEASE ORAL DAILY
Qty: 30 CAP | Refills: 0 | Status: SHIPPED | OUTPATIENT
Start: 2019-09-24

## 2019-09-23 RX ORDER — METRONIDAZOLE 500 MG/1
500 TABLET ORAL EVERY 12 HOURS
Qty: 14 TAB | Refills: 0 | Status: SHIPPED
Start: 2019-09-23 | End: 2019-09-30

## 2019-09-23 RX ORDER — LOPERAMIDE HYDROCHLORIDE 2 MG/1
2 CAPSULE ORAL
Qty: 60 CAP | Refills: 0 | Status: SHIPPED
Start: 2019-09-23 | End: 2019-10-03

## 2019-09-23 RX ORDER — BUSPIRONE HYDROCHLORIDE 5 MG/1
5 TABLET ORAL 3 TIMES DAILY
Qty: 90 TAB | Refills: 0 | Status: SHIPPED
Start: 2019-09-23

## 2019-09-23 RX ORDER — CIPROFLOXACIN 500 MG/1
500 TABLET ORAL EVERY 12 HOURS
Qty: 14 TAB | Refills: 0 | Status: SHIPPED
Start: 2019-09-23 | End: 2019-09-30

## 2019-09-23 RX ADMIN — HYDROCHLOROTHIAZIDE 12.5 MG: 25 TABLET ORAL at 09:28

## 2019-09-23 RX ADMIN — CIPROFLOXACIN HYDROCHLORIDE 500 MG: 500 TABLET, FILM COATED ORAL at 09:28

## 2019-09-23 RX ADMIN — METRONIDAZOLE 500 MG: 500 TABLET, FILM COATED ORAL at 05:20

## 2019-09-23 RX ADMIN — HEPARIN SODIUM 5000 UNITS: 5000 INJECTION INTRAVENOUS; SUBCUTANEOUS at 09:27

## 2019-09-23 RX ADMIN — MENTHOL, METHYL SALICYLATE: 10; 15 CREAM TOPICAL at 09:28

## 2019-09-23 RX ADMIN — VENLAFAXINE HYDROCHLORIDE 150 MG: 150 CAPSULE, EXTENDED RELEASE ORAL at 09:28

## 2019-09-23 RX ADMIN — DILTIAZEM HYDROCHLORIDE 120 MG: 120 CAPSULE, COATED, EXTENDED RELEASE ORAL at 09:28

## 2019-09-23 RX ADMIN — MENTHOL, METHYL SALICYLATE: 10; 15 CREAM TOPICAL at 13:00

## 2019-09-23 RX ADMIN — Medication 5 ML: at 05:20

## 2019-09-23 RX ADMIN — BUSPIRONE HYDROCHLORIDE 5 MG: 5 TABLET ORAL at 09:28

## 2019-09-23 RX ADMIN — LISINOPRIL 10 MG: 5 TABLET ORAL at 09:27

## 2019-09-23 NOTE — PROGRESS NOTES
Care Management Interventions  PCP Verified by CM: Yes  Mode of Transport at Discharge: Other (see comment)  Transition of Care Consult (CM Consult): SNF  Current Support Network: Lives Alone(7 days a week care givers)  Confirm Follow Up Transport: Family  Plan discussed with Pt/Family/Caregiver: Yes  Freedom of Choice Offered: Yes  Discharge Location  Discharge Placement: Skilled nursing facility([facility])  Pt is ready for d/c to  [facility], Rm #Janki 8 , Patient and family aware, RN given # to call report. [facility] scheduled for  @[phone] . Packet on desk.

## 2019-09-23 NOTE — PROGRESS NOTES
TRANSFER - IN REPORT:    Verbal report received from Elena Taylor (name) on Kristine Bayron  being [facility] for routine progression of care      Report consisted of patients Situation, Background, Assessment and   Recommendations(SBAR). Information from the following report(s) SBAR, Kardex, Intake/Output, MAR and Recent Results was reviewed with the receiving nurse. Opportunity for questions and clarification was provided. Assessment completed upon patients arrival to unit and care assumed.

## 2019-09-23 NOTE — DISCHARGE SUMMARY
Hospitalist Discharge Summary     Patient ID:  Lee Silva  [id number]  13 y.o.  8/31/1930  Admit date: 9/16/2019 12:57 PM  Discharge date and time: 9/23/2019  Attending: Safia Mcgarry MD  PCP:  Paulie Farias MD  Treatment Team: Attending Provider: Safia Mcgarry MD; Utilization Review: [employer]; Care Manager: Everett Daily RN    Principal Diagnosis Colitis   Hospital Problems as of 9/23/2019 Never Reviewed          Codes Class Noted - Resolved POA    Chronic pain syndrome ICD-10-CM: G89.4  ICD-9-CM: 338.4  9/23/2019 - Present Yes        Primary osteoarthritis of both feet ICD-10-CM: M19.071, M19.072  ICD-9-CM: 715.17  9/23/2019 - Present Yes        Paroxysmal atrial fibrillation ([facility] ICD-10-CM: I48.0  ICD-9-CM: 427.31  9/19/2019 - Present Yes        Atrial fibrillation with RVR ([facility] ICD-10-CM: I48.91  ICD-9-CM: 427.31  9/19/2019 - Present No        Depression with anxiety ICD-10-CM: F41.8  ICD-9-CM: 300.4  9/19/2019 - Present Yes        * (Principal) Colitis ICD-10-CM: K52.9  ICD-9-CM: 558.9  9/16/2019 - Present Yes        MONICA (acute kidney injury) ([facility] ICD-10-CM: N17.9  ICD-9-CM: 584.9  9/16/2019 - Present Yes        HTN (hypertension) ICD-10-CM: I10  ICD-9-CM: 401.9  9/16/2019 - Present Yes                 HPI:  'Pt not a good historian. No family at bedside .     Later on after admission was able to speak to son-Kourtney Montgomery and daughter    in law-  According to them pt lives alone, has care giver who comes half a day(care giver had viral illness in Thursday- was off for few days and came back to work today). Pt has diagnosed with afib after her hip surgery in feb- presently not on any medications- says pt is on coreg and metoprolol(? Why). Also on medications for depression and htn. Chronic pain medications for hip pain. PCP has been changing medications for bp as it was uncontrolled.   Pt was ok this am- started having diarrhea,vomiting and abdominal pain- hence care giver called 911.   Pt main c/o was abdominal pain at the time of my examination- says pain medications didn't help. Presently has rectal tube.   Pt otherwise didn't c/o headache or dizziness or chest pain or sob or rectal bleeding or hemoptysis.   ua nitrite positive  Creatinine  1.64,gfr 31,lactic acid 2.64  C.diff pending  Ct abd pelvis with out contrast-  Mural thickening with stranding densities of the entire colon. This  could be ischemic or inflammatory. C. difficile should be considered.   Pt will be admitted for colitis ischemic vs inflammatory.'    Hospital Course:  Please refer to the admission H&P for details of presentation. In summary, the patient is an 79 yo F with history of dementia and significant health decline over the last 6 months, who was admitted 9/16 with colitis and severe abdominal pain. She was started on IV cipro and flagyl with abdominal pain improving and diarrhea slowing. Seen by [facility] and colitis felt to be infectious (though recurrent with previous episode at [facility] in 3/2019). There are plans for outpatient follow up with GI to evaluate further for inflammatory bowel disease. Hospital course complicated by a fib with RVR and she was transferred to ICU on cardizem gtt. She has had a fib in the past.  Coreg changed to Toprol and PO cardizem added and a fib was rate controlled. NO oral anticoagulation long-term due to high fall risk and chance for GI bleed with recurrent colitis. She also had significant sundowning with her dementia. Required a few doses of IV haldol and ultimately started on seroquel with improvement. It was deemed she is not safe to return home without 24 hour care, and she was unstable on her feet, so she will go to [facility] to try to gain strength and to give her family more time to make long-term care arrangements. She complained of significant R foot pain on dorsum and had swelling of mid foot on both feet (R>L).   X ray of R foot on day of discharge showed only degenerative changes and no fracture. It is suspected that she bumped her foot at some point and flared her osteoarthritis. Pain seemed better with Bengay. She will be discharged to [facility] in an improved and stable condition. Significant Diagnostic Studies:       Labs: Results:       Chemistry Recent Labs     09/22/19  0443 09/21/19  0315   GLU 99 102*    139   K 3.7 3.8    110*   CO2 25 24   BUN 16 17   CREA 1.02* 0.93   CA 8.9 8.8   AGAP 6* 5*      CBC w/Diff No results for input(s): WBC, RBC, HGB, HCT, PLT, GRANS, LYMPH, EOS, HGBEXT, HCTEXT, PLTEXT in the last 72 hours. Cardiac Enzymes No results for input(s): CPK, CKND1, CONCEPCION in the last 72 hours. No lab exists for component: CKRMB, TROIP   Coagulation No results for input(s): PTP, INR, APTT in the last 72 hours. No lab exists for component: INREXT    Lipid Panel No results found for: CHOL, CHOLPOCT, CHOLX, CHLST, CHOLV, 183704, HDL, HDLP, LDL, LDLC, DLDLP, 024232, VLDLC, VLDL, TGLX, TRIGL, TRIGP, TGLPOCT, CHHD, CHHDX   BNP No results for input(s): BNPP in the last 72 hours. Liver Enzymes No results for input(s): TP, ALB, TBIL, AP, SGOT, GPT in the last 72 hours.     No lab exists for component: DBIL   Thyroid Studies No results found for: T4, T3U, TSH, TSHEXT       Results     Procedure Component Value Units Date/Time    CULTURE, BLOOD [[id number]] Collected:  09/16/19 1710    Order Status:  Completed Specimen:  Blood Updated:  09/21/19 0814     Special Requests: --        LEFT  Antecubital       Culture result: NO GROWTH 5 DAYS       CULTURE, BLOOD [[id number]] Collected:  09/16/19 1710    Order Status:  Completed Specimen:  Blood Updated:  09/21/19 0814     Special Requests: --        RIGHT  Antecubital       Culture result: NO GROWTH 5 DAYS       C. DIFFICILE AG & TOXIN A/B [[id number]] Collected:  09/16/19 6877    Order Status:  Completed Specimen:  Stool Updated:  09/17/19 8995 1639 Cristal Galindo ANTIGEN       SHELLY. DIFFICILE GDH ANTIGEN-NEGATIVE           C. difficile toxin       C. DIFFICILE TOXIN-NEGATIVE           PCR Reflex NOT APPLICABLE        INTERPRETATION       NEGATIVE FOR TOXIGENIC C. DIFFICILE           Clinical Consideration       NEGATIVE FOR TOXIGENIC C. DIFFICILE          CULTURE, URINE [[id number]] Collected:  09/16/19 1446    Order Status:  Completed Specimen:  Urine from Clean catch Updated:  09/19/19 9565     Special Requests: NO SPECIAL REQUESTS        Culture result:       <10,000  COLONIES/mL  NORMAL SKIN MARY ISOLATED          Imaging:    XR FOOT RT MIN 3 V   Final Result   IMPRESSION: Midfoot degenerative joint disease with dorsally oriented   osteophytes. XR CHEST SNGL V   Final Result   IMPRESSION: Probably normal portable chest               CT ABD PELV WO CONT   Final Result   IMPRESSION:  Mural thickening with stranding densities of the entire colon. This   could be ischemic or inflammatory. C. difficile should be considered. Discharge Exam:  Visit Vitals  /82 (BP 1 Location: Right arm, BP Patient Position: At rest)   Pulse 81   Temp 98.5 °F (36.9 °C)   Resp 18   Ht 5' 4\" (1.626 m)   Wt 52.6 kg (116 lb)   SpO2 96%   BMI 19.91 kg/m²     Patient Vitals for the past 24 hrs:   Temp Pulse Resp BP SpO2   09/23/19 0716 98.5 °F (36.9 °C) 81 18 132/82 96 %   09/23/19 0304 98.5 °F (36.9 °C) 73 18 100/53 94 %   09/23/19 0017    (!) 88/56    09/22/19 2320 98.4 °F (36.9 °C) 71 16 (!) 83/45 92 %   09/22/19 1946 97.6 °F (36.4 °C) 94 16 120/69 94 %   09/22/19 1600 97.6 °F (36.4 °C) 91 18 125/78 95 %   09/22/19 1140 97.4 °F (36.3 °C) 79 18 122/76 93 %       General appearance: alert, cooperative, no distress, elderly, frail  Lungs: clear to auscultation bilaterally  Heart: irregular rhythm, rate controlled  Abdomen: soft, non-tender.  Bowel sounds normal. No masses,  no organomegaly  Extremities: no cyanosis or edema, swelling of both mid feet (R>L), ecchymoses of legs  Neurologic: Grossly normal    Disposition: SNF  Discharge Condition: stable  Patient Instructions:   Current Discharge Medication List      START taking these medications    Details   busPIRone (BUSPAR) 5 mg tablet Take 1 Tab by mouth three (3) times daily. Indications: Repeated Episodes of Anxiety  Qty: 90 Tab, Refills: 0      ciprofloxacin HCl (CIPRO) 500 mg tablet Take 1 Tab by mouth every twelve (12) hours for 7 days. Indications: Colitis  Qty: 14 Tab, Refills: 0      dilTIAZem CD (CARDIZEM CD) 120 mg ER capsule Take 1 Cap by mouth daily. Indications: high blood pressure, Ventricular Rate Control in Atrial Fibrillation  Qty: 30 Cap, Refills: 0      loperamide (IMODIUM) 2 mg capsule Take 1 Cap by mouth every four (4) hours as needed for Diarrhea for up to 10 days. Indications: diarrhea  Qty: 60 Cap, Refills: 0      methyl salicylate-menthol (BENGAY) 15-10 % topical cream Apply  to affected area four (4) times daily. Apply to painful area on R foot and any other muculoskeletal areas needed  Qty: 1 Tube, Refills: 0      metoprolol succinate (TOPROL-XL) 100 mg tablet Take 1 Tab by mouth every evening. Indications: Ventricular Rate Control in Atrial Fibrillation  Qty: 30 Tab, Refills: 0      metroNIDAZOLE (FLAGYL) 500 mg tablet Take 1 Tab by mouth every twelve (12) hours for 7 days. Indications: Colitis  Qty: 14 Tab, Refills: 0      QUEtiapine (SEROQUEL) 25 mg tablet Take 1 Tab by mouth every evening. Indications: Sundowning in dementia  Qty: 30 Tab, Refills: 0         CONTINUE these medications which have CHANGED    Details   HYDROcodone-acetaminophen (NORCO) 5-325 mg per tablet Take 1 Tab by mouth every eight (8) hours as needed for Pain for up to 5 days. Max Daily Amount: 3 Tabs. Qty: 15 Tab, Refills: 0    Associated Diagnoses: Chronic pain syndrome         CONTINUE these medications which have NOT CHANGED    Details   calcium-cholecalciferol, d3, (CALCIUM 600 + D) 600-125 mg-unit tab Take 1 Tab by mouth two (2) times a day. amLODIPine (NORVASC) 5 mg tablet Take 5 mg by mouth daily. benazepril-hydroCHLOROthiazide (LOTENSIN HCT) 10-12.5 mg per tablet Take 1 Tab by mouth daily. Venlafaxine 150 mg tr24 Take 150 mg by mouth.      mirtazapine (REMERON) 15 mg tablet Take 15 mg by mouth nightly. STOP taking these medications       carvedilol (COREG) 3.125 mg tablet Comments:   Reason for Stopping:         folic acid (FOLVITE) 1 mg tablet Comments:   Reason for Stopping:               Activity: PT/OT Eval and Treat  Diet: mechanical soft    Code Status:  DNR    Discharge plan discussed with her son, [facility] and her caregiver, Ryan Hannon. Follow-up      Follow-up Appointments   Procedures    FOLLOW UP VISIT Appointment in: Other (Specify) 1) Physician at [facility] 2) [facility] in 2-3 weeks for hospital follow up for recurrent colitis     1) Physician at [facility]  2) [facility] in 2-3 weeks for hospital follow up for recurrent colitis     Standing Status:   Standing     Number of Occurrences:   1     Order Specific Question:   Appointment in     Answer:    Other (Specify)   ·   ·   Time spent to discharge patient 31 minutes  Signed:  Irina Gibbons MD  9/23/2019  10:44 AM

## 2019-09-23 NOTE — PROGRESS NOTES
Patient asleep in bed this AM, slept well overnight. Incontinent of stool, pericare given, new brief placed. Patient repositioned in bed for comfort, will monitor.

## 2019-09-23 NOTE — PROGRESS NOTES
A.M assessment complete; pt in bed. Resp even & unlabored on room air; lungs clear. HR irregular. Abdomen soft with active bowel sounds. Brief changed; stool loose. Bed low & locked; call light in reach; no needs voiced.

## 2020-02-27 ENCOUNTER — APPOINTMENT (OUTPATIENT)
Dept: CT IMAGING | Age: 85
End: 2020-02-27
Attending: EMERGENCY MEDICINE
Payer: MEDICARE

## 2020-02-27 ENCOUNTER — HOSPITAL ENCOUNTER (EMERGENCY)
Age: 85
Discharge: HOME | End: 2020-02-27
Attending: EMERGENCY MEDICINE
Payer: MEDICARE

## 2020-02-27 VITALS
HEART RATE: 101 BPM | SYSTOLIC BLOOD PRESSURE: 170 MMHG | OXYGEN SATURATION: 100 % | DIASTOLIC BLOOD PRESSURE: 91 MMHG | TEMPERATURE: 97.5 F | RESPIRATION RATE: 18 BRPM | HEIGHT: 61 IN | WEIGHT: 107 LBS | BODY MASS INDEX: 20.2 KG/M2

## 2020-02-27 DIAGNOSIS — S01.81XA FACIAL LACERATION, INITIAL ENCOUNTER: Primary | ICD-10-CM

## 2020-02-27 LAB
ATRIAL RATE: 340 BPM
CALCULATED R AXIS, ECG10: 12 DEGREES
CALCULATED T AXIS, ECG11: 65 DEGREES
DIAGNOSIS, 93000: NORMAL
Q-T INTERVAL, ECG07: 360 MS
QRS DURATION, ECG06: 80 MS
QTC CALCULATION (BEZET), ECG08: 464 MS
VENTRICULAR RATE, ECG03: 100 BPM

## 2020-02-27 PROCEDURE — 93005 ELECTROCARDIOGRAM TRACING: CPT | Performed by: EMERGENCY MEDICINE

## 2020-02-27 PROCEDURE — 75810000293 HC SIMP/SUPERF WND  RPR

## 2020-02-27 PROCEDURE — 70450 CT HEAD/BRAIN W/O DYE: CPT

## 2020-02-27 PROCEDURE — 99284 EMERGENCY DEPT VISIT MOD MDM: CPT

## 2020-02-27 RX ORDER — CEPHALEXIN 500 MG/1
500 CAPSULE ORAL 3 TIMES DAILY
Qty: 21 CAP | Refills: 0 | Status: SHIPPED | OUTPATIENT
Start: 2020-02-27 | End: 2020-03-05

## 2020-02-27 NOTE — DISCHARGE INSTRUCTIONS
Follow-up with either your doctor or here in 14 days to have the sutures removed. It is important to do dressing changes regularly over the next several days. Use plenty of ointment on the skin to prevent the dressing from sticking to the wound. Return to the emergency department if there are signs of infection such as redness, swelling or increased pain despite the antibiotics.

## 2020-02-27 NOTE — ED TRIAGE NOTES
Patient arrives via EMS from doctor's office. Patient was walking out and had fall, bumped head. No LOC, no hx of blood thinners. A&Ox3, baseline. /104 en route, hx of HTN, patient states she is taking BP meds.

## 2020-02-27 NOTE — ED PROVIDER NOTES
Family states that they were walking her out of her office appointment doctor's appointment this afternoon. The daughter states that she went to put her purse in the back and left the patient standing next to the car. In a split second she was not attending to her, the patient fell and struck her face on the pavement. She sustained a large open laceration to her left forehead. The daughter denies any loss of consciousness for the patient. There was extensive bleeding which was improved with placing a dressing on the wound. EMS was called. The patient has dementia, is a poor historian. Past Medical History:   Diagnosis Date    ADHD     Arthritis     Hypertension        No past surgical history on file. No family history on file.     Social History     Socioeconomic History    Marital status:      Spouse name: Not on file    Number of children: Not on file    Years of education: Not on file    Highest education level: Not on file   Occupational History    Not on file   Social Needs    Financial resource strain: Not on file    Food insecurity:     Worry: Not on file     Inability: Not on file    Transportation needs:     Medical: Not on file     Non-medical: Not on file   Tobacco Use    Smoking status: Never Smoker    Smokeless tobacco: Never Used   Substance and Sexual Activity    Alcohol use: No     Frequency: Never    Drug use: No    Sexual activity: Not Currently   Lifestyle    Physical activity:     Days per week: Not on file     Minutes per session: Not on file    Stress: Not on file   Relationships    Social connections:     Talks on phone: Not on file     Gets together: Not on file     Attends [employer] service: Not on file     Active member of club or organization: Not on file     Attends meetings of clubs or organizations: Not on file     Relationship status: Not on file    Intimate partner violence:     Fear of current or ex partner: Not on file     Emotionally abused: Not on file     Physically abused: Not on file     Forced sexual activity: Not on file   Other Topics Concern    Not on file   Social History Narrative    Not on file         ALLERGIES: Patient has no known allergies. Review of Systems   Constitutional: Negative for chills and fever. Gastrointestinal: Negative for nausea and vomiting. All other systems reviewed and are negative. Vitals:    02/27/20 1555 02/27/20 1751 02/27/20 1807   BP: (!) 200/101 (!) 179/107    Pulse: 98 (!) 102 100   Resp: 18 15 18   Temp: 97.5 °F (36.4 °C)     SpO2: 98% 99% 94%   Weight: 48.5 kg (107 lb)     Height: 5' 1\" (1.549 m)              Physical Exam  Vitals signs and nursing note reviewed. Constitutional:       Appearance: She is well-developed. HENT:      Head:        Comments: Jagged open laceration left forehead as indicated total length 5 cm  Eyes:      Conjunctiva/sclera: Conjunctivae normal.      Pupils: Pupils are equal, round, and reactive to light. Neck:      Musculoskeletal: Normal range of motion and neck supple. Pulmonary:      Effort: Pulmonary effort is normal. No respiratory distress. Musculoskeletal:         General: No tenderness. Skin:     General: Skin is warm and dry. Neurological:      Mental Status: She is alert and oriented to person, place, and time.    Psychiatric:         Behavior: Behavior normal.          MDM  Number of Diagnoses or Management Options     Amount and/or Complexity of Data Reviewed  Tests in the radiology section of CPT®: ordered and reviewed  Obtain history from someone other than the patient: yes    Risk of Complications, Morbidity, and/or Mortality  Presenting problems: moderate  Diagnostic procedures: moderate  Management options: moderate    Patient Progress  Patient progress: improved         Wound Repair  Date/Time: 2/27/2020 6:49 PM  Performed by: attendingPreparation: skin prepped with Betadine and sterile field established  Pre-procedure re-eval: Immediately prior to the procedure, the patient was reevaluated and found suitable for the planned procedure and any planned medications. Time out: Immediately prior to the procedure a time out was called to verify the correct patient, procedure, equipment, staff and marking as appropriate. .  Location details: face  Wound length:2.6 - 7.5 cm  Foreign bodies: no foreign bodies  Irrigation solution: saline  Irrigation method: syringe  Debridement: moderate  Skin closure: 4-0 nylon  Number of sutures: 8  Technique: simple  Approximation: close  Dressing: 4x4 and antibiotic ointment  Patient tolerance: Patient tolerated the procedure well with no immediate complications  My total time at bedside, performing this procedure was 16-30 minutes.   Comments: Also placed a figure-of-eight suture to achieve hemostasis of a small arterial bleed

## 2020-02-28 NOTE — ED NOTES
I have reviewed discharge instructions with the patient and family . The patient and family verbalized understanding. Patient left ED via wheelchair to home with family. Opportunities for questions and clarification provided. Patient given 1 script.

## 2020-02-28 NOTE — ED NOTES
Applied antibacterial ointment, 4x4 gauze, and coban to patient's wound and explained procedure to family.

## 2020-04-24 ENCOUNTER — HOSPITAL ENCOUNTER (EMERGENCY)
Age: 85
Discharge: HOME | End: 2020-04-24
Attending: EMERGENCY MEDICINE
Payer: MEDICARE

## 2020-04-24 VITALS
SYSTOLIC BLOOD PRESSURE: 101 MMHG | HEART RATE: 80 BPM | HEIGHT: 61 IN | BODY MASS INDEX: 21.71 KG/M2 | OXYGEN SATURATION: 94 % | RESPIRATION RATE: 14 BRPM | WEIGHT: 115 LBS | DIASTOLIC BLOOD PRESSURE: 72 MMHG | TEMPERATURE: 97.6 F

## 2020-04-24 DIAGNOSIS — E86.0 DEHYDRATION: ICD-10-CM

## 2020-04-24 DIAGNOSIS — I95.1 ORTHOSTATIC HYPOTENSION: Primary | ICD-10-CM

## 2020-04-24 LAB
ALBUMIN SERPL-MCNC: 2.9 G/DL (ref 3.2–4.6)
ALBUMIN/GLOB SERPL: 0.8 {RATIO} (ref 1.2–3.5)
ALP SERPL-CCNC: 122 U/L (ref 50–130)
ALT SERPL-CCNC: 16 U/L (ref 12–65)
ANION GAP SERPL CALC-SCNC: 7 MMOL/L (ref 7–16)
APPEARANCE UR: CLEAR
AST SERPL-CCNC: 27 U/L (ref 15–37)
BACTERIA URNS QL MICRO: 0 /HPF
BASOPHILS # BLD: 0 K/UL (ref 0–0.2)
BASOPHILS NFR BLD: 1 % (ref 0–2)
BILIRUB SERPL-MCNC: 0.3 MG/DL (ref 0.2–1.1)
BILIRUB UR QL: NEGATIVE
BUN SERPL-MCNC: 27 MG/DL (ref 8–23)
CALCIUM SERPL-MCNC: 9 MG/DL (ref 8.3–10.4)
CASTS URNS QL MICRO: ABNORMAL /LPF
CHLORIDE SERPL-SCNC: 106 MMOL/L (ref 98–107)
CO2 SERPL-SCNC: 28 MMOL/L (ref 21–32)
COLOR UR: YELLOW
CREAT SERPL-MCNC: 1.57 MG/DL (ref 0.6–1)
DIFFERENTIAL METHOD BLD: ABNORMAL
EOSINOPHIL # BLD: 0.1 K/UL (ref 0–0.8)
EOSINOPHIL NFR BLD: 1 % (ref 0.5–7.8)
EPI CELLS #/AREA URNS HPF: ABNORMAL /HPF
ERYTHROCYTE [DISTWIDTH] IN BLOOD BY AUTOMATED COUNT: 14.7 % (ref 11.9–14.6)
GLOBULIN SER CALC-MCNC: 3.5 G/DL (ref 2.3–3.5)
GLUCOSE SERPL-MCNC: 83 MG/DL (ref 65–100)
GLUCOSE UR STRIP.AUTO-MCNC: NEGATIVE MG/DL
HCT VFR BLD AUTO: 35.5 % (ref 35.8–46.3)
HGB BLD-MCNC: 11.1 G/DL (ref 11.7–15.4)
HGB UR QL STRIP: NEGATIVE
IMM GRANULOCYTES # BLD AUTO: 0 K/UL (ref 0–0.5)
IMM GRANULOCYTES NFR BLD AUTO: 1 % (ref 0–5)
KETONES UR QL STRIP.AUTO: ABNORMAL MG/DL
LEUKOCYTE ESTERASE UR QL STRIP.AUTO: NEGATIVE
LYMPHOCYTES # BLD: 1.5 K/UL (ref 0.5–4.6)
LYMPHOCYTES NFR BLD: 25 % (ref 13–44)
MCH RBC QN AUTO: 30.1 PG (ref 26.1–32.9)
MCHC RBC AUTO-ENTMCNC: 31.3 G/DL (ref 31.4–35)
MCV RBC AUTO: 96.2 FL (ref 79.6–97.8)
MONOCYTES # BLD: 0.4 K/UL (ref 0.1–1.3)
MONOCYTES NFR BLD: 7 % (ref 4–12)
NEUTS SEG # BLD: 3.8 K/UL (ref 1.7–8.2)
NEUTS SEG NFR BLD: 65 % (ref 43–78)
NITRITE UR QL STRIP.AUTO: NEGATIVE
NRBC # BLD: 0 K/UL (ref 0–0.2)
PH UR STRIP: 7.5 [PH] (ref 5–9)
PLATELET # BLD AUTO: 183 K/UL (ref 150–450)
PMV BLD AUTO: 10.1 FL (ref 9.4–12.3)
POTASSIUM SERPL-SCNC: 3.6 MMOL/L (ref 3.5–5.1)
PROT SERPL-MCNC: 6.4 G/DL (ref 6.3–8.2)
PROT UR STRIP-MCNC: 30 MG/DL
RBC # BLD AUTO: 3.69 M/UL (ref 4.05–5.2)
SODIUM SERPL-SCNC: 141 MMOL/L (ref 136–145)
SP GR UR REFRACTOMETRY: 1.02 (ref 1–1.02)
UROBILINOGEN UR QL STRIP.AUTO: 0.2 EU/DL (ref 0.2–1)
WBC # BLD AUTO: 5.8 K/UL (ref 4.3–11.1)
WBC URNS QL MICRO: ABNORMAL /HPF

## 2020-04-24 PROCEDURE — 80053 COMPREHEN METABOLIC PANEL: CPT

## 2020-04-24 PROCEDURE — 74011250636 HC RX REV CODE- 250/636: Performed by: EMERGENCY MEDICINE

## 2020-04-24 PROCEDURE — 99285 EMERGENCY DEPT VISIT HI MDM: CPT

## 2020-04-24 PROCEDURE — 85025 COMPLETE CBC W/AUTO DIFF WBC: CPT

## 2020-04-24 PROCEDURE — 81001 URINALYSIS AUTO W/SCOPE: CPT

## 2020-04-24 PROCEDURE — 96360 HYDRATION IV INFUSION INIT: CPT

## 2020-04-24 PROCEDURE — 93005 ELECTROCARDIOGRAM TRACING: CPT | Performed by: EMERGENCY MEDICINE

## 2020-04-24 RX ADMIN — SODIUM CHLORIDE 250 ML: 900 INJECTION, SOLUTION INTRAVENOUS at 15:59

## 2020-04-24 NOTE — ED NOTES
I have reviewed discharge instructions with the patient and son. The patient and son verbalized understanding. Patient left ED via Discharge Method: wheelchair to Home with son. Opportunity for questions and clarification provided. Patient given 1 scripts. Instructed to push fluids to prevent dehydration and dizziness. To continue your aftercare when you leave the hospital, you may receive an automated call from our care team to check in on how you are doing. This is a free service and part of our promise to provide the best care and service to meet your aftercare needs.  If you have questions, or wish to unsubscribe from this service please call [phone]. Thank you for Choosing our [facility] Emergency Department.

## 2020-04-24 NOTE — DISCHARGE INSTRUCTIONS
Drink plenty of fluids. Always rise from lying or sitting to standing very slowly to ensure you have no dizziness prior to walking. Return for any worsening or concerning symptoms.

## 2020-04-24 NOTE — ED PROVIDER NOTES
40-year-old female with history of atrial fibrillation, dementia, hypertension, arthritis presents with sudden onset of dizziness today. She sat down on the couch and started to feel better. She denies feeling as if she would pass out. She denies actually passing out or falling. She did have some sensation of the room spinning. Symptoms have now mostly resolved, but still \"does not feel quite right. \"  No headache, hearing changes, chest pain, shortness of breath, fever, cough, congestion, travel, known COVID exposure. She is not on anticoagulation due to fall risk and history of colitis with concern for GI bleeding. Denies recent changes in medications. No focal weakness or numbness. She does admit to having difficulty walking. EMS reported orthostasis and gave 1L fluids and zofran. Family concerned about possible UTI. I wore appropriate PPE throughout this patient's ED visit. Nayely Sanchez MD, 3:36 PM        The history is provided by the patient. Dizziness   Pertinent negatives include no shortness of breath, no chest pain, no vomiting, no confusion, no headaches and no nausea. Past Medical History:   Diagnosis Date    ADHD     Arthritis     Hypertension        No past surgical history on file. No family history on file.     Social History     Socioeconomic History    Marital status:      Spouse name: Not on file    Number of children: Not on file    Years of education: Not on file    Highest education level: Not on file   Occupational History    Not on file   Social Needs    Financial resource strain: Not on file    Food insecurity     Worry: Not on file     Inability: Not on file    Transportation needs     Medical: Not on file     Non-medical: Not on file   Tobacco Use    Smoking status: Never Smoker    Smokeless tobacco: Never Used   Substance and Sexual Activity    Alcohol use: No     Frequency: Never    Drug use: No    Sexual activity: Not Currently Lifestyle    Physical activity     Days per week: Not on file     Minutes per session: Not on file    Stress: Not on file   Relationships    Social connections     Talks on phone: Not on file     Gets together: Not on file     Attends Gnosticist service: Not on file     Active member of club or organization: Not on file     Attends meetings of clubs or organizations: Not on file     Relationship status: Not on file    Intimate partner violence     Fear of current or ex partner: Not on file     Emotionally abused: Not on file     Physically abused: Not on file     Forced sexual activity: Not on file   Other Topics Concern    Not on file   Social History Narrative    Not on file         ALLERGIES: Patient has no known allergies. Review of Systems   Constitutional: Positive for fatigue. Negative for chills and fever. HENT: Negative for hearing loss. Eyes: Negative for visual disturbance. Respiratory: Negative for cough and shortness of breath. Cardiovascular: Negative for chest pain and palpitations. Gastrointestinal: Negative for abdominal pain, diarrhea, nausea and vomiting. Musculoskeletal: Negative for back pain. Skin: Negative for rash. Neurological: Positive for dizziness. Negative for syncope, weakness, numbness and headaches. Psychiatric/Behavioral: Negative for confusion. Vitals:    04/24/20 1508   BP: 145/72   Pulse: 79   Resp: 14   Temp: 97.6 °F (36.4 °C)   SpO2: 97%   Weight: 52.2 kg (115 lb)   Height: 5' 1\" (1.549 m)            Physical Exam  Vitals signs and nursing note reviewed. Constitutional:       Appearance: She is well-developed. HENT:      Head: Normocephalic and atraumatic. Eyes:      Pupils: Pupils are equal, round, and reactive to light. Neck:      Musculoskeletal: Normal range of motion and neck supple. Cardiovascular:      Rate and Rhythm: Regular rhythm. Heart sounds: Normal heart sounds.    Pulmonary:      Effort: Pulmonary effort is normal. Breath sounds: Normal breath sounds. Abdominal:      Palpations: Abdomen is soft. Tenderness: There is no abdominal tenderness. Musculoskeletal: Normal range of motion. Skin:     General: Skin is warm and dry. Neurological:      Mental Status: She is alert. Cranial Nerves: No cranial nerve deficit. Sensory: No sensory deficit. Coordination: Coordination normal. Finger-Nose-Finger Test normal.      Comments: No nystagmus   Psychiatric:         Behavior: Behavior normal.          MDM  Number of Diagnoses or Management Options  Diagnosis management comments: Parts of this document were created using dragon voice recognition software. The chart has been reviewed but errors may still be present. 4:29 PM  Still orthostatic despite the 1 L given by EMS. BUN and creatinine slightly elevated. Son states that she does not drink well. No sign of UTI. Will give slight more fluids, and ambulate prior to discharge. Patient and son in agreement with discharge plan. I discussed the results of all labs, procedures, radiographs, and treatments with the patient and available family. Treatment plan is agreed upon and the patient is ready for discharge. Questions about treatment in the ED and differential diagnosis of presenting condition were answered. Patient was given verbal discharge instructions including, but not limited to, importance of returning to the emergency department for any concern of worsening or continued symptoms. Instructions were given to follow up with a primary care provider or specialist within 1-2 days. Adverse effects of medications, if prescribed, were discussed and patient was advised to refrain from significant physical activity until followed up by primary care physician and to not drive or operate heavy machinery after taking any sedating substances.            Amount and/or Complexity of Data Reviewed  Clinical lab tests: ordered and reviewed (Results for orders placed or performed during the hospital encounter of 04/24/20  -CBC WITH AUTOMATED DIFF       Result                      Value             Ref Range           WBC                         5.8               4.3 - 11.1 K*       RBC                         3.69 (L)          4.05 - 5.2 M*       HGB                         11.1 (L)          11.7 - 15.4 *       HCT                         35.5 (L)          35.8 - 46.3 %       MCV                         96.2              79.6 - 97.8 *       MCH                         30.1              26.1 - 32.9 *       MCHC                        31.3 (L)          31.4 - 35.0 *       RDW                         14.7 (H)          11.9 - 14.6 %       PLATELET                    183               150 - 450 K/*       MPV                         10.1              9.4 - 12.3 FL       ABSOLUTE NRBC               0.00              0.0 - 0.2 K/*       DF                          AUTOMATED                             NEUTROPHILS                 65                43 - 78 %           LYMPHOCYTES                 25                13 - 44 %           MONOCYTES                   7                 4.0 - 12.0 %        EOSINOPHILS                 1                 0.5 - 7.8 %         BASOPHILS                   1                 0.0 - 2.0 %         IMMATURE GRANULOCYTES       1                 0.0 - 5.0 %         ABS. NEUTROPHILS            3.8               1.7 - 8.2 K/*       ABS. LYMPHOCYTES            1.5               0.5 - 4.6 K/*       ABS. MONOCYTES              0.4               0.1 - 1.3 K/*       ABS. EOSINOPHILS            0.1               0.0 - 0.8 K/*       ABS. BASOPHILS              0.0               0.0 - 0.2 K/*       ABS. IMM.  GRANS.            0.0               0.0 - 0.5 K/*  -METABOLIC PANEL, COMPREHENSIVE       Result                      Value             Ref Range           Sodium                      141               136 - 145 mm*       Potassium                   3.6 3.5 - 5.1 mm*       Chloride                    106               98 - 107 mmo*       CO2                         28                21 - 32 mmol*       Anion gap                   7                 7 - 16 mmol/L       Glucose                     83                65 - 100 mg/*       BUN                         27 (H)            8 - 23 MG/DL        Creatinine                  1.57 (H)          0.6 - 1.0 MG*       GFR est AA                  40 (L)            >60 ml/min/1*       GFR est non-AA              33 (L)            >60 ml/min/1*       Calcium                     9.0               8.3 - 10.4 M*       Bilirubin, total            0.3               0.2 - 1.1 MG*       ALT (SGPT)                  16                12 - 65 U/L         AST (SGOT)                  27                15 - 37 U/L         Alk.  phosphatase            122               50 - 130 U/L        Protein, total              6.4               6.3 - 8.2 g/*       Albumin                     2.9 (L)           3.2 - 4.6 g/*       Globulin                    3.5               2.3 - 3.5 g/*       A-G Ratio                   0.8 (L)           1.2 - 3.5      -URINALYSIS W/ RFLX MICROSCOPIC       Result                      Value             Ref Range           Color                       YELLOW                                Appearance                  CLEAR                                 Specific gravity            1.017             1.001 - 1.02*       pH (UA)                     7.5               5.0 - 9.0           Protein                     30 (A)            NEG mg/dL           Glucose                     Negative          mg/dL               Ketone                      TRACE (A)         NEG mg/dL           Bilirubin                   Negative          NEG                 Blood                       Negative          NEG                 Urobilinogen                0.2               0.2 - 1.0 EU*       Nitrites                    Negative NEG                 Leukocyte Esterase          Negative          NEG                 WBC                         0-3               0 /hpf              Epithelial cells            0-3               0 /hpf              Bacteria                    0                 0 /hpf              Casts                       5-10              0 /lpf         )           Procedures

## 2020-04-24 NOTE — ED NOTES
Pt ambulated in room with walker with slow but steady gait. Denies dizziness. Pt approved for discharge.

## 2020-04-24 NOTE — ED TRIAGE NOTES
Pt to ED via EMS from home c/o syncope. Pt lives alone and aides visit daily. Pt was walking from kitchen to living room and started to feel dizzy. Hx of UTI and son states that \"this is how UTIs start. \" Pt \"feels off. \" No fever. VSS per EMS. Pt orthostatic with EMS and receive 1L NS that was completed upon arrival to ED. Also received 4mg zofran IVP.

## 2020-04-25 LAB
ATRIAL RATE: 288 BPM
CALCULATED P AXIS, ECG09: 0 DEGREES
CALCULATED R AXIS, ECG10: -13 DEGREES
CALCULATED T AXIS, ECG11: 4 DEGREES
DIAGNOSIS, 93000: NORMAL
Q-T INTERVAL, ECG07: 368 MS
QRS DURATION, ECG06: 82 MS
QTC CALCULATION (BEZET), ECG08: 408 MS
VENTRICULAR RATE, ECG03: 74 BPM

## 2020-10-14 ENCOUNTER — APPOINTMENT (OUTPATIENT)
Dept: CT IMAGING | Age: 85
DRG: 683 | End: 2020-10-14
Attending: EMERGENCY MEDICINE
Payer: MEDICARE

## 2020-10-14 ENCOUNTER — HOSPITAL ENCOUNTER (INPATIENT)
Age: 85
LOS: 2 days | Discharge: NURSING, REHAB OR LONG-TERM CARE | DRG: 683 | End: 2020-10-16
Attending: EMERGENCY MEDICINE | Admitting: INTERNAL MEDICINE
Payer: MEDICARE

## 2020-10-14 ENCOUNTER — APPOINTMENT (OUTPATIENT)
Dept: GENERAL RADIOLOGY | Age: 85
DRG: 683 | End: 2020-10-14
Attending: EMERGENCY MEDICINE
Payer: MEDICARE

## 2020-10-14 DIAGNOSIS — F03.90 DEMENTIA WITHOUT BEHAVIORAL DISTURBANCE, UNSPECIFIED DEMENTIA TYPE: ICD-10-CM

## 2020-10-14 DIAGNOSIS — N30.01 ACUTE CYSTITIS WITH HEMATURIA: Primary | ICD-10-CM

## 2020-10-14 DIAGNOSIS — R29.6 MULTIPLE FALLS: ICD-10-CM

## 2020-10-14 DIAGNOSIS — N28.9 MILD RENAL INSUFFICIENCY: ICD-10-CM

## 2020-10-14 PROBLEM — E83.52 HYPERCALCEMIA: Status: ACTIVE | Noted: 2020-10-14

## 2020-10-14 PROBLEM — N17.9 AKI (ACUTE KIDNEY INJURY) (HCC): Status: ACTIVE | Noted: 2020-10-14

## 2020-10-14 PROBLEM — I48.0 PAROXYSMAL ATRIAL FIBRILLATION (HCC): Chronic | Status: ACTIVE | Noted: 2019-09-19

## 2020-10-14 PROBLEM — I10 HTN (HYPERTENSION): Chronic | Status: ACTIVE | Noted: 2019-09-16

## 2020-10-14 PROBLEM — N39.0 UTI (URINARY TRACT INFECTION): Status: ACTIVE | Noted: 2020-10-14

## 2020-10-14 PROBLEM — F41.8 DEPRESSION WITH ANXIETY: Chronic | Status: ACTIVE | Noted: 2019-09-19

## 2020-10-14 LAB
ALBUMIN SERPL-MCNC: 3.6 G/DL (ref 3.2–4.6)
ALBUMIN/GLOB SERPL: 0.9 {RATIO} (ref 1.2–3.5)
ALP SERPL-CCNC: 127 U/L (ref 50–136)
ALT SERPL-CCNC: 21 U/L (ref 12–65)
ANION GAP SERPL CALC-SCNC: 7 MMOL/L (ref 7–16)
APPEARANCE UR: ABNORMAL
AST SERPL-CCNC: 37 U/L (ref 15–37)
BACTERIA URNS QL MICRO: ABNORMAL /HPF
BILIRUB SERPL-MCNC: 0.5 MG/DL (ref 0.2–1.1)
BILIRUB UR QL: NEGATIVE
BUN SERPL-MCNC: 25 MG/DL (ref 8–23)
CALCIUM SERPL-MCNC: 10.5 MG/DL (ref 8.3–10.4)
CALCIUM SERPL-MCNC: 10.5 MG/DL (ref 8.3–10.4)
CASTS URNS QL MICRO: ABNORMAL /LPF
CHLORIDE SERPL-SCNC: 103 MMOL/L (ref 98–107)
CO2 SERPL-SCNC: 28 MMOL/L (ref 21–32)
COLOR UR: YELLOW
CREAT SERPL-MCNC: 1.89 MG/DL (ref 0.6–1)
EPI CELLS #/AREA URNS HPF: 0 /HPF
ERYTHROCYTE [DISTWIDTH] IN BLOOD BY AUTOMATED COUNT: 13.8 % (ref 11.9–14.6)
GLOBULIN SER CALC-MCNC: 4 G/DL (ref 2.3–3.5)
GLUCOSE SERPL-MCNC: 91 MG/DL (ref 65–100)
GLUCOSE UR STRIP.AUTO-MCNC: NEGATIVE MG/DL
HCT VFR BLD AUTO: 36.6 % (ref 35.8–46.3)
HGB BLD-MCNC: 11.8 G/DL (ref 11.7–15.4)
HGB UR QL STRIP: NEGATIVE
KETONES UR QL STRIP.AUTO: NEGATIVE MG/DL
LEUKOCYTE ESTERASE UR QL STRIP.AUTO: ABNORMAL
MAGNESIUM SERPL-MCNC: 2.4 MG/DL (ref 1.8–2.4)
MCH RBC QN AUTO: 31.1 PG (ref 26.1–32.9)
MCHC RBC AUTO-ENTMCNC: 32.2 G/DL (ref 31.4–35)
MCV RBC AUTO: 96.6 FL (ref 79.6–97.8)
NITRITE UR QL STRIP.AUTO: POSITIVE
NRBC # BLD: 0 K/UL (ref 0–0.2)
PH UR STRIP: 7 [PH] (ref 5–9)
PLATELET # BLD AUTO: 158 K/UL (ref 150–450)
PMV BLD AUTO: 10.9 FL (ref 9.4–12.3)
POTASSIUM SERPL-SCNC: 4 MMOL/L (ref 3.5–5.1)
PROT SERPL-MCNC: 7.6 G/DL (ref 6.3–8.2)
PROT UR STRIP-MCNC: ABNORMAL MG/DL
PTH-INTACT SERPL-MCNC: 39.6 PG/ML (ref 18.5–88)
RBC # BLD AUTO: 3.79 M/UL (ref 4.05–5.2)
RBC #/AREA URNS HPF: ABNORMAL /HPF
SODIUM SERPL-SCNC: 138 MMOL/L (ref 136–145)
SP GR UR REFRACTOMETRY: 1.02 (ref 1–1.02)
UROBILINOGEN UR QL STRIP.AUTO: 0.2 EU/DL (ref 0.2–1)
WBC # BLD AUTO: 7.9 K/UL (ref 4.3–11.1)
WBC URNS QL MICRO: ABNORMAL /HPF

## 2020-10-14 PROCEDURE — 96365 THER/PROPH/DIAG IV INF INIT: CPT

## 2020-10-14 PROCEDURE — 65270000029 HC RM PRIVATE

## 2020-10-14 PROCEDURE — 85027 COMPLETE CBC AUTOMATED: CPT

## 2020-10-14 PROCEDURE — 70450 CT HEAD/BRAIN W/O DYE: CPT

## 2020-10-14 PROCEDURE — 83970 ASSAY OF PARATHORMONE: CPT

## 2020-10-14 PROCEDURE — 87186 SC STD MICRODIL/AGAR DIL: CPT

## 2020-10-14 PROCEDURE — 80053 COMPREHEN METABOLIC PANEL: CPT

## 2020-10-14 PROCEDURE — 74011250637 HC RX REV CODE- 250/637: Performed by: INTERNAL MEDICINE

## 2020-10-14 PROCEDURE — 81001 URINALYSIS AUTO W/SCOPE: CPT

## 2020-10-14 PROCEDURE — 86580 TB INTRADERMAL TEST: CPT | Performed by: EMERGENCY MEDICINE

## 2020-10-14 PROCEDURE — 74011250636 HC RX REV CODE- 250/636: Performed by: INTERNAL MEDICINE

## 2020-10-14 PROCEDURE — 83735 ASSAY OF MAGNESIUM: CPT

## 2020-10-14 PROCEDURE — 74011000302 HC RX REV CODE- 302: Performed by: EMERGENCY MEDICINE

## 2020-10-14 PROCEDURE — 87635 SARS-COV-2 COVID-19 AMP PRB: CPT

## 2020-10-14 PROCEDURE — 74011250636 HC RX REV CODE- 250/636: Performed by: EMERGENCY MEDICINE

## 2020-10-14 PROCEDURE — 99283 EMERGENCY DEPT VISIT LOW MDM: CPT

## 2020-10-14 PROCEDURE — 87086 URINE CULTURE/COLONY COUNT: CPT

## 2020-10-14 PROCEDURE — 71045 X-RAY EXAM CHEST 1 VIEW: CPT

## 2020-10-14 PROCEDURE — 74011000258 HC RX REV CODE- 258: Performed by: EMERGENCY MEDICINE

## 2020-10-14 PROCEDURE — 87088 URINE BACTERIA CULTURE: CPT

## 2020-10-14 RX ORDER — VENLAFAXINE HYDROCHLORIDE 150 MG/1
150 CAPSULE, EXTENDED RELEASE ORAL DAILY
Status: DISCONTINUED | OUTPATIENT
Start: 2020-10-15 | End: 2020-10-16 | Stop reason: HOSPADM

## 2020-10-14 RX ORDER — SODIUM CHLORIDE 0.9 % (FLUSH) 0.9 %
5-40 SYRINGE (ML) INJECTION AS NEEDED
Status: DISCONTINUED | OUTPATIENT
Start: 2020-10-14 | End: 2020-10-16 | Stop reason: HOSPADM

## 2020-10-14 RX ORDER — POLYETHYLENE GLYCOL 3350 17 G/17G
17 POWDER, FOR SOLUTION ORAL DAILY PRN
Status: DISCONTINUED | OUTPATIENT
Start: 2020-10-14 | End: 2020-10-16 | Stop reason: HOSPADM

## 2020-10-14 RX ORDER — DILTIAZEM HYDROCHLORIDE 180 MG/1
180 CAPSULE, COATED, EXTENDED RELEASE ORAL DAILY
Status: DISCONTINUED | OUTPATIENT
Start: 2020-10-15 | End: 2020-10-16 | Stop reason: HOSPADM

## 2020-10-14 RX ORDER — MIRTAZAPINE 15 MG/1
15 TABLET, FILM COATED ORAL
Status: DISCONTINUED | OUTPATIENT
Start: 2020-10-14 | End: 2020-10-16 | Stop reason: HOSPADM

## 2020-10-14 RX ORDER — PHENYLEPH/SHARK OIL/COCOA BUTR 0.25 %-3 %
1 SUPPOSITORY, RECTAL RECTAL AS NEEDED
COMMUNITY

## 2020-10-14 RX ORDER — RIVASTIGMINE 4.6 MG/24H
1 PATCH, EXTENDED RELEASE TRANSDERMAL DAILY
COMMUNITY
End: 2020-10-16

## 2020-10-14 RX ORDER — ONDANSETRON 4 MG/1
4 TABLET, ORALLY DISINTEGRATING ORAL
Status: DISCONTINUED | OUTPATIENT
Start: 2020-10-14 | End: 2020-10-16 | Stop reason: HOSPADM

## 2020-10-14 RX ORDER — CHOLECALCIFEROL (VITAMIN D3) 125 MCG
CAPSULE ORAL
COMMUNITY

## 2020-10-14 RX ORDER — SODIUM CHLORIDE 9 MG/ML
25 INJECTION, SOLUTION INTRAVENOUS CONTINUOUS
Status: DISPENSED | OUTPATIENT
Start: 2020-10-14 | End: 2020-10-15

## 2020-10-14 RX ORDER — FAMOTIDINE 20 MG/1
20 TABLET, FILM COATED ORAL
Status: DISCONTINUED | OUTPATIENT
Start: 2020-10-14 | End: 2020-10-16 | Stop reason: HOSPADM

## 2020-10-14 RX ORDER — ACETAMINOPHEN 325 MG/1
650 TABLET ORAL
COMMUNITY

## 2020-10-14 RX ORDER — POTASSIUM CHLORIDE 7.45 MG/ML
10 INJECTION INTRAVENOUS AS NEEDED
Status: DISCONTINUED | OUTPATIENT
Start: 2020-10-14 | End: 2020-10-16 | Stop reason: HOSPADM

## 2020-10-14 RX ORDER — FOLIC ACID 1 MG/1
1 TABLET ORAL DAILY
COMMUNITY

## 2020-10-14 RX ORDER — POLYETHYLENE GLYCOL 3350 17 G/17G
17 POWDER, FOR SOLUTION ORAL
COMMUNITY

## 2020-10-14 RX ORDER — LANOLIN ALCOHOL/MO/W.PET/CERES
1000 CREAM (GRAM) TOPICAL DAILY
COMMUNITY

## 2020-10-14 RX ORDER — ONDANSETRON 2 MG/ML
4 INJECTION INTRAMUSCULAR; INTRAVENOUS
Status: DISCONTINUED | OUTPATIENT
Start: 2020-10-14 | End: 2020-10-16 | Stop reason: HOSPADM

## 2020-10-14 RX ORDER — FACIAL-BODY WIPES
10 EACH TOPICAL DAILY PRN
Status: DISCONTINUED | OUTPATIENT
Start: 2020-10-14 | End: 2020-10-16 | Stop reason: HOSPADM

## 2020-10-14 RX ORDER — QUETIAPINE FUMARATE 25 MG/1
50 TABLET, FILM COATED ORAL EVERY EVENING
Status: DISCONTINUED | OUTPATIENT
Start: 2020-10-14 | End: 2020-10-16 | Stop reason: HOSPADM

## 2020-10-14 RX ORDER — BUSPIRONE HYDROCHLORIDE 5 MG/1
5 TABLET ORAL 2 TIMES DAILY
Status: DISCONTINUED | OUTPATIENT
Start: 2020-10-14 | End: 2020-10-16 | Stop reason: HOSPADM

## 2020-10-14 RX ORDER — METOPROLOL SUCCINATE 50 MG/1
50 TABLET, EXTENDED RELEASE ORAL EVERY EVENING
Status: DISCONTINUED | OUTPATIENT
Start: 2020-10-14 | End: 2020-10-15 | Stop reason: SDUPTHER

## 2020-10-14 RX ORDER — CHOLECALCIFEROL (VITAMIN D3) 125 MCG
1 CAPSULE ORAL DAILY
COMMUNITY

## 2020-10-14 RX ORDER — AMLODIPINE BESYLATE 5 MG/1
5 TABLET ORAL DAILY
Status: CANCELLED | OUTPATIENT
Start: 2020-10-15

## 2020-10-14 RX ORDER — ACETAMINOPHEN 650 MG/1
650 SUPPOSITORY RECTAL
Status: DISCONTINUED | OUTPATIENT
Start: 2020-10-14 | End: 2020-10-16 | Stop reason: HOSPADM

## 2020-10-14 RX ORDER — MAGNESIUM SULFATE HEPTAHYDRATE 40 MG/ML
2 INJECTION, SOLUTION INTRAVENOUS AS NEEDED
Status: DISCONTINUED | OUTPATIENT
Start: 2020-10-14 | End: 2020-10-16 | Stop reason: HOSPADM

## 2020-10-14 RX ORDER — ACETAMINOPHEN 325 MG/1
650 TABLET ORAL
Status: DISCONTINUED | OUTPATIENT
Start: 2020-10-14 | End: 2020-10-16 | Stop reason: HOSPADM

## 2020-10-14 RX ORDER — SODIUM CHLORIDE 0.9 % (FLUSH) 0.9 %
5-40 SYRINGE (ML) INJECTION EVERY 8 HOURS
Status: DISCONTINUED | OUTPATIENT
Start: 2020-10-14 | End: 2020-10-16 | Stop reason: HOSPADM

## 2020-10-14 RX ADMIN — Medication 10 ML: at 18:00

## 2020-10-14 RX ADMIN — ACETAMINOPHEN 650 MG: 325 TABLET, FILM COATED ORAL at 20:57

## 2020-10-14 RX ADMIN — Medication 10 ML: at 21:03

## 2020-10-14 RX ADMIN — TUBERCULIN PURIFIED PROTEIN DERIVATIVE 5 UNITS: 5 INJECTION, SOLUTION INTRADERMAL at 18:42

## 2020-10-14 RX ADMIN — SODIUM CHLORIDE 100 ML/HR: 900 INJECTION, SOLUTION INTRAVENOUS at 18:42

## 2020-10-14 RX ADMIN — CEFTRIAXONE 1 G: 1 INJECTION, POWDER, FOR SOLUTION INTRAMUSCULAR; INTRAVENOUS at 15:09

## 2020-10-14 RX ADMIN — MIRTAZAPINE 15 MG: 15 TABLET, FILM COATED ORAL at 20:57

## 2020-10-14 RX ADMIN — QUETIAPINE FUMARATE 50 MG: 25 TABLET ORAL at 18:41

## 2020-10-14 RX ADMIN — BUSPIRONE HYDROCHLORIDE 5 MG: 5 TABLET ORAL at 18:41

## 2020-10-14 NOTE — ED PROVIDER NOTES
The history is provided by the patient and a relative. Fall   Incident onset: Multiple times over the last several weeks. She fell from a height of ground level. Impact surface: Various surfaces. There was no blood loss. The point of impact was the head. The pain is present in the head. The pain is mild. Associated symptoms include headaches. Pertinent negatives include no numbness, no extremity weakness, no loss of consciousness and no tingling. The risk factors include dementia. The symptoms are aggravated by activity. Past Medical History:   Diagnosis Date    ADHD     Arthritis     Hypertension        No past surgical history on file. No family history on file.     Social History     Socioeconomic History    Marital status:      Spouse name: Not on file    Number of children: Not on file    Years of education: Not on file    Highest education level: Not on file   Occupational History    Not on file   Social Needs    Financial resource strain: Not on file    Food insecurity     Worry: Not on file     Inability: Not on file    Transportation needs     Medical: Not on file     Non-medical: Not on file   Tobacco Use    Smoking status: Never Smoker    Smokeless tobacco: Never Used   Substance and Sexual Activity    Alcohol use: No     Frequency: Never    Drug use: No    Sexual activity: Not Currently   Lifestyle    Physical activity     Days per week: Not on file     Minutes per session: Not on file    Stress: Not on file   Relationships    Social connections     Talks on phone: Not on file     Gets together: Not on file     Attends Zoroastrianism service: Not on file     Active member of club or organization: Not on file     Attends meetings of clubs or organizations: Not on file     Relationship status: Not on file    Intimate partner violence     Fear of current or ex partner: Not on file     Emotionally abused: Not on file     Physically abused: Not on file     Forced sexual activity: Not on file   Other Topics Concern    Not on file   Social History Narrative    Not on file         ALLERGIES: Patient has no known allergies. Review of Systems   Unable to perform ROS: Dementia   Musculoskeletal: Negative for extremity weakness. Neurological: Positive for headaches. Negative for tingling, loss of consciousness and numbness. Vitals:    10/14/20 1238   BP: 130/70   Pulse: 88   Resp: 16   Temp: 97.8 °F (36.6 °C)   SpO2: 98%   Weight: 50.3 kg (111 lb)   Height: 5' 1\" (1.549 m)            Physical Exam  Vitals signs and nursing note reviewed. Constitutional:       General: She is not in acute distress. Appearance: She is well-developed. HENT:      Head: Normocephalic. Mouth/Throat:      Comments: Mucous membranes tacky  Eyes:      Pupils: Pupils are equal, round, and reactive to light. Cardiovascular:      Rate and Rhythm: Normal rate and regular rhythm. Heart sounds: Normal heart sounds. Pulmonary:      Effort: Pulmonary effort is normal.      Breath sounds: Normal breath sounds. Abdominal:      General: There is no distension. Palpations: Abdomen is soft. There is no mass. Tenderness: There is no abdominal tenderness. There is no guarding or rebound. Musculoskeletal: Normal range of motion. General: Signs of injury ( Multiple bruises and superficial skin tears) present. No tenderness. Right lower leg: No edema. Left lower leg: No edema. Lymphadenopathy:      Cervical: No cervical adenopathy. Skin:     General: Skin is warm and dry. Neurological:      General: No focal deficit present. Mental Status: She is alert. Cranial Nerves: No cranial nerve deficit. Sensory: No sensory deficit. Motor: No weakness.       Coordination: Coordination normal.          MDM  Number of Diagnoses or Management Options  Diagnosis management comments: CT scan to evaluate for subdural hematoma or any sort of intracranial hemorrhage given repeated falls and evidence of some head injury. Check labs and electrolytes. Check for UTI. Patient is without complaint at this time. Family requesting placement. Social work consulted for placement. Amount and/or Complexity of Data Reviewed  Clinical lab tests: ordered and reviewed (Results for orders placed or performed during the hospital encounter of 00/13/32  -METABOLIC PANEL, COMPREHENSIVE       Result                      Value             Ref Range           Sodium                      138               136 - 145 mm*       Potassium                   4.0               3.5 - 5.1 mm*       Chloride                    103               98 - 107 mmo*       CO2                         28                21 - 32 mmol*       Anion gap                   7                 7 - 16 mmol/L       Glucose                     91                65 - 100 mg/*       BUN                         25 (H)            8 - 23 MG/DL        Creatinine                  1.89 (H)          0.6 - 1.0 MG*       GFR est AA                  32 (L)            >60 ml/min/1*       GFR est non-AA              27 (L)            >60 ml/min/1*       Calcium                     10.5 (H)          8.3 - 10.4 M*       Bilirubin, total            0.5               0.2 - 1.1 MG*       ALT (SGPT)                  21                12 - 65 U/L         AST (SGOT)                  37                15 - 37 U/L         Alk.  phosphatase            127               50 - 136 U/L        Protein, total              7.6               6.3 - 8.2 g/*       Albumin                     3.6               3.2 - 4.6 g/*       Globulin                    4.0 (H)           2.3 - 3.5 g/*       A-G Ratio                   0.9 (L)           1.2 - 3.5      -URINALYSIS W/ RFLX MICROSCOPIC       Result                      Value             Ref Range           Color                       YELLOW                                Appearance CLOUDY                                Specific gravity            1.019             1.001 - 1.02*       pH (UA)                     7.0               5.0 - 9.0           Protein                     TRACE (A)         NEG mg/dL           Glucose                     Negative          mg/dL               Ketone                      Negative          NEG mg/dL           Bilirubin                   Negative          NEG                 Blood                       Negative          NEG                 Urobilinogen                0.2               0.2 - 1.0 EU*       Nitrites                    Positive (A)      NEG                 Leukocyte Esterase          MODERATE (A)      NEG                 WBC                                     0 /hpf              RBC                         5-10              0 /hpf              Epithelial cells            0                 0 /hpf              Bacteria                    4+ (H)            0 /hpf              Casts                       0-3               0 /lpf         -MAGNESIUM       Result                      Value             Ref Range           Magnesium                   2.4               1.8 - 2.4 mg*  -CBC W/O DIFF       Result                      Value             Ref Range           WBC                         7.9               4.3 - 11.1 K*       RBC                         3.79 (L)          4.05 - 5.2 M*       HGB                         11.8              11.7 - 15.4 *       HCT                         36.6              35.8 - 46.3 %       MCV                         96.6              79.6 - 97.8 *       MCH                         31.1              26.1 - 32.9 *       MCHC                        32.2              31.4 - 35.0 *       RDW                         13.8              11.9 - 14.6 %       PLATELET                    158               150 - 450 K/*       MPV                         10.9              9.4 - 12.3 FL       ABSOLUTE NRBC               0.00 0.0 - 0.2 K/*  )  Tests in the radiology section of CPT®: ordered and reviewed (Ct Head Wo Cont    Result Date: 10/14/2020  CT Brain dated 10/14/2020  Comparison: 2/27/2020 Clinical Information:  Recent falls  5 mm axial images were obtained from skull base to vertex without contrast. Radiation dose reduction techniques were used for this study. Our scanners use one or all of the following:  Automated exposure control, adjustment of the mA and/or kV according to patient size, iterative reconstruction. Findings: Ventricles, sulci and cisterns are normal in size. No midline shift. Ill-defined hypodensity in the cerebral white matter is consistent chronic ischemic white matter change. No hemorrhage, mass, mass effect or acute territorial infarction. No extra-axial abnormality. No skull fracture. Mastoid air cells and visualized paranasal sinuses are aerated and unremarkable. Impression: No Acute Abnormality     Xr Chest Port    Result Date: 10/14/2020  Portable AP upright chest dated 10/14/2020 at 1259 hours Prior chest x-ray 9/18/2019 CLINICAL INFORMATION: Decreased appetite, recent falls Heart is enlarged and thoracic aorta mildly tortuous. Pulmonary vascularity is normal and lungs are clear. No pleural effusion. There is elevation right diaphragm. This is unchanged.      IMPRESSION: No acute abnormality    )  Obtain history from someone other than the patient: yes  Review and summarize past medical records: yes           Procedures

## 2020-10-14 NOTE — H&P
Hospitalist Note     Admit Date:  10/14/2020 12:34 PM   Name:  Nancy Becerra   Age:  80 y.o.  :  1930   MRN:  [id number]   PCP:  Inna Disla MD  Treatment Team: Attending Provider: Yue Chery MD; Primary Nurse: Lynne Ivey RN; Primary Nurse: Atul Yuen; Care Manager: Omero Roland RN    HPI/Subjective:   Pt is a 81 y/o F with afib, HTN, who presented to ER with urinary changes, falls at home. She lives independently for most part and ambulates with rolling walker. Family assists often. Family says for the last few days urine has been darker, malodorous, with some dysuria. Pt has had decreased appetite and intake, and seems weaker. Has fallen several times in few days which is more than usual.  She is also having more confusion than usual.  She is compliant with meds. Family has a had time getting her to drink water and stay hydrated even at baseline. She reported chills yesterday also. No CP, SOB, cough, diarrhea, n/v, fevers. 10 systems reviewed and negative except as noted in HPI. Past Medical History:   Diagnosis Date    ADHD     Arthritis     Hypertension       No past surgical history on file. No Known Allergies   Social History     Tobacco Use    Smoking status: Never Smoker    Smokeless tobacco: Never Used   Substance Use Topics    Alcohol use: No     Frequency: Never      No family history on file. Family history reviewed and noncontributory.   Immunization History   Administered Date(s) Administered    TB Skin Test (PPD) Intradermal 2019     PTA Medications:  Current Outpatient Medications   Medication Instructions    benazepril-hydroCHLOROthiazide (LOTENSIN HCT) 10-12.5 mg per tablet 1 Tab, Oral, DAILY    busPIRone (BUSPAR) 5 mg, Oral, 3 TIMES DAILY    calcium-cholecalciferol, d3, (CALCIUM 600 + D) 600-125 mg-unit tab 1 Tab, Oral, 2 TIMES DAILY    dilTIAZem ER (CARDIZEM CD) 120 mg, Oral, DAILY    methyl salicylate-menthol (BENGAY) 15-10 % topical cream Topical, 4 TIMES DAILY, Apply to painful area on R foot and any other muculoskeletal areas needed    metoprolol succinate (TOPROL-XL) 100 mg, Oral, EVERY EVENING    mirtazapine (REMERON) 15 mg, Oral, EVERY BEDTIME    QUEtiapine (SEROQUEL) 25 mg, Oral, EVERY EVENING    Venlafaxine-ER 24 HR (EFFEXOR-ER) 150 mg, Oral       Objective:     Patient Vitals for the past 24 hrs:   Temp Pulse Resp BP SpO2   10/14/20 1238 97.8 °F (36.6 °C) 88 16 130/70 98 %     Oxygen Therapy  O2 Sat (%): 98 % (10/14/20 1238)  Pulse via Oximetry: 68 beats per minute (10/14/20 1238)  O2 Device: Room air (10/14/20 1238)    Estimated body mass index is 20.97 kg/m² as calculated from the following:    Height as of this encounter: 5' 1\" (1.549 m). Weight as of this encounter: 50.3 kg (111 lb). No intake or output data in the 24 hours ending 10/14/20 1603    *Note that automatically entered I/Os may not be accurate; dependent on patient compliance with collection and accurate  by assistants. Physical Exam:  General:    Well nourished. Alert. Eyes:   Normal sclerae. Extraocular movements intact. HENT:  Normocephalic, atraumatic. Moist mucous membranes  CV:   RRR. No m/r/g. Lungs:  CTAB. No wheezing, rhonchi, or rales. Abdomen: Soft, nontender, nondistended. Extremities: Warm and dry. No cyanosis or edema. Neurologic: CN II-XII grossly intact. Sensation intact. Skin:     No rashes or jaundice. Normal coloration. Numerous confluent bruises on legs and 2 on scalp  Psych:  Normal mood and affect. I reviewed the labs, imaging, EKGs, telemetry, and other studies done this admission.   Data Reviewed:   Recent Results (from the past 24 hour(s))   METABOLIC PANEL, COMPREHENSIVE    Collection Time: 10/14/20  1:29 PM   Result Value Ref Range    Sodium 138 136 - 145 mmol/L    Potassium 4.0 3.5 - 5.1 mmol/L    Chloride 103 98 - 107 mmol/L    CO2 28 21 - 32 mmol/L    Anion gap 7 7 - 16 mmol/L    Glucose 91 65 - 100 mg/dL    BUN 25 (H) 8 - 23 MG/DL    Creatinine 1.89 (H) 0.6 - 1.0 MG/DL    GFR est AA 32 (L) >60 ml/min/1.73m2    GFR est non-AA 27 (L) >60 ml/min/1.73m2    Calcium 10.5 (H) 8.3 - 10.4 MG/DL    Bilirubin, total 0.5 0.2 - 1.1 MG/DL    ALT (SGPT) 21 12 - 65 U/L    AST (SGOT) 37 15 - 37 U/L    Alk.  phosphatase 127 50 - 136 U/L    Protein, total 7.6 6.3 - 8.2 g/dL    Albumin 3.6 3.2 - 4.6 g/dL    Globulin 4.0 (H) 2.3 - 3.5 g/dL    A-G Ratio 0.9 (L) 1.2 - 3.5     MAGNESIUM    Collection Time: 10/14/20  1:29 PM   Result Value Ref Range    Magnesium 2.4 1.8 - 2.4 mg/dL   URINALYSIS W/ RFLX MICROSCOPIC    Collection Time: 10/14/20  2:02 PM   Result Value Ref Range    Color YELLOW      Appearance CLOUDY      Specific gravity 1.019 1.001 - 1.023      pH (UA) 7.0 5.0 - 9.0      Protein TRACE (A) NEG mg/dL    Glucose Negative mg/dL    Ketone Negative NEG mg/dL    Bilirubin Negative NEG      Blood Negative NEG      Urobilinogen 0.2 0.2 - 1.0 EU/dL    Nitrites Positive (A) NEG      Leukocyte Esterase MODERATE (A) NEG      WBC  0 /hpf    RBC 5-10 0 /hpf    Epithelial cells 0 0 /hpf    Bacteria 4+ (H) 0 /hpf    Casts 0-3 0 /lpf   CBC W/O DIFF    Collection Time: 10/14/20  2:41 PM   Result Value Ref Range    WBC 7.9 4.3 - 11.1 K/uL    RBC 3.79 (L) 4.05 - 5.2 M/uL    HGB 11.8 11.7 - 15.4 g/dL    HCT 36.6 35.8 - 46.3 %    MCV 96.6 79.6 - 97.8 FL    MCH 31.1 26.1 - 32.9 PG    MCHC 32.2 31.4 - 35.0 g/dL    RDW 13.8 11.9 - 14.6 %    PLATELET 276 698 - 454 K/uL    MPV 10.9 9.4 - 12.3 FL    ABSOLUTE NRBC 0.00 0.0 - 0.2 K/uL       All Micro Results     Procedure Component Value Units Date/Time    CULTURE, URINE [[id number]]     Order Status:  Sent Specimen:  Cath Urine           Medications Administered     cefTRIAXone (ROCEPHIN) 1 g in 0.9% sodium chloride (MBP/ADV) 50 mL     Admin Date  10/14/2020 Action  New Bag Dose  1 g Rate  100 mL/hr Route  IntraVENous Administered By  Ela Branch RN Other Studies:  No results found for this visit on 10/14/20. Ct Head Wo Cont    Result Date: 10/14/2020  CT Brain dated 10/14/2020  Comparison: 2/27/2020 Clinical Information:  Recent falls  5 mm axial images were obtained from skull base to vertex without contrast. Radiation dose reduction techniques were used for this study. Our scanners use one or all of the following:  Automated exposure control, adjustment of the mA and/or kV according to patient size, iterative reconstruction. Findings: Ventricles, sulci and cisterns are normal in size. No midline shift. Ill-defined hypodensity in the cerebral white matter is consistent chronic ischemic white matter change. No hemorrhage, mass, mass effect or acute territorial infarction. No extra-axial abnormality. No skull fracture. Mastoid air cells and visualized paranasal sinuses are aerated and unremarkable. Impression: No Acute Abnormality     Xr Chest Port    Result Date: 10/14/2020  Portable AP upright chest dated 10/14/2020 at 1259 hours Prior chest x-ray 9/18/2019 CLINICAL INFORMATION: Decreased appetite, recent falls Heart is enlarged and thoracic aorta mildly tortuous. Pulmonary vascularity is normal and lungs are clear. No pleural effusion. There is elevation right diaphragm. This is unchanged.      IMPRESSION: No acute abnormality      SARS-CoV-2 Lab Results  \"Novel Coronavirus\" Test: No results found for: COV2NT   \"Emergent Disease\" Test: No results found for: EDPR  \"SARS-COV-2\" Test: No results found for: XGCOVT  Rapid Test: No results found for: COVR           Assessment and Plan:     Hospital Problems as of 10/14/2020 Never Reviewed          Codes Class Noted - Resolved POA    * (Principal) MONICA (acute kidney injury) ([facility] [address] ICD-10-CM: N17.9  ICD-9-CM: 584.9  10/14/2020 - Present Yes        UTI (urinary tract infection) ICD-10-CM: N39.0  ICD-9-CM: 599.0  10/14/2020 - Present Yes        Hypercalcemia ICD-10-CM: P18.15  ICD-9-CM: 275.42  10/14/2020 - Present Yes        Paroxysmal atrial fibrillation (HCC) (Chronic) ICD-10-CM: I48.0  ICD-9-CM: 427.31  9/19/2019 - Present Yes        Depression with anxiety (Chronic) ICD-10-CM: F41.8  ICD-9-CM: 300.4  9/19/2019 - Present Yes        HTN (hypertension) (Chronic) ICD-10-CM: I10  ICD-9-CM: 401.9  9/16/2019 - Present Yes              Plan:  · Inpatient  · Rocephin  · Urine cx  · IVF  · Check vit D, PTH for hyperCa. Hold home supplement  · Hold home norvasc as already on CCB for afib  · Hold home lotensin due to MONICA  · Cont other home meds for HTN, afib, depression. No AC due to falls. Discharge planning:    · Probably needs SNF. PT/OT/CM consulted.   · PPD/COVID testing ordered    DVT ppx ordered  Code status:  Full, discuss further after admission  Estimated LOS:  Greater than 2 midnights  Risk:  high    Signed:  John Sosa MD

## 2020-10-14 NOTE — ED NOTES
TRANSFER - OUT REPORT:    Verbal report given to [facility] RN on Joaquina Lizama  being transferred to [facility] for routine progression of care       Report consisted of patients Situation, Background, Assessment and   Recommendations(SBAR). Information from the following report(s) SBAR, ED Summary and MAR was reviewed with the receiving nurse. Lines:   Peripheral IV 10/14/20 Right; Lower Forearm (Active)        Opportunity for questions and clarification was provided.       Patient transported with:   Registered Nurse

## 2020-10-14 NOTE — PROGRESS NOTES
Problem: Falls - Risk of  Goal: *Absence of Falls  Description: Document Salome Barahona Fall Risk and appropriate interventions in the flowsheet. Outcome: Progressing Towards Goal  Note: Fall Risk Interventions:                                Problem: Patient Education: Go to Patient Education Activity  Goal: Patient/Family Education  Outcome: Progressing Towards Goal     Problem: Impaired Skin Integrity/Pressure Injury Treatment  Goal: *Improvement of Existing Pressure Injury  Outcome: Progressing Towards Goal  Goal: *Prevention of pressure injury  Description: Document Pietro Scale and appropriate interventions in the flowsheet.   Outcome: Progressing Towards Goal  Note: Pressure Injury Interventions:                                            Problem: Patient Education: Go to Patient Education Activity  Goal: Patient/Family Education  Outcome: Progressing Towards Goal

## 2020-10-14 NOTE — PROGRESS NOTES
Physical therapy note: checked with RN and hospitalist in with pt now. Will initiate assessment in the am. Thanks.  Edmond Petty, PT

## 2020-10-14 NOTE — PROGRESS NOTES
10/14/20 1744   Dual Skin Pressure Injury Assessment   Dual Skin Pressure Injury Assessment WDL   Second Care Provider (Based on [address] [place] [address]) Azalea RN   Skin Integumentary   Skin Integumentary (WDL) X    Pressure  Injury Documentation No Pressure Injury Noted-Pressure Ulcer Prevention Initiated   Skin Color Ecchymosis (comment)  (gen)   Skin Condition/Temp Fragile   Skin Integrity Abrasion  (generalized to BLE)   Wound Prevention and Protection Methods   Orientation of Wound Prevention Posterior   Location of Wound Prevention Sacrum/Coccyx   Dressing Present  No   Wound Offloading (Prevention Methods) Bed, pressure reduction mattress   Pietro Scale (11years of age and older)   Sensory Perception 3   Sensory Interventions Assess changes in LOC; Check visual cues for pain   Moisture 3   Moisture Interventions Check for incontinence Q2 hours and as needed   Activity 3   Activity Interventions Increase time out of bed;Pressure redistribution bed/mattress(bed type);PT/OT evaluation   Mobility 3   Mobility Interventions PT/OT evaluation;Pressure redistribution bed/mattress (bed type)   Nutrition 2   Nutrition Interventions Document food/fluid/supplement intake; Offer support with meals,snacks and hydration   Friction and Shear 2   Friction and Shear Interventions Transferring/repositioning devices   Pietro Score 16

## 2020-10-14 NOTE — PROGRESS NOTES
Meet with family at there request. Pt lives at home, alone in a 2 story home, but does not need anything on the 2nd level. She uses a walker, rollator or wc to aid in ambulation but only with assistance of a caregiver. Her son Claire Ingram) and wife Deanne Cadet) are at bedside and providing pt information. He pays for pt private duty caregivers for 3 hrs in the am Monday-Friday, then they go over every evening to make dinner and get her ready for bed. They are with her for the weekends. Cirilo Liao states that pt has had a overall decline in the past few months and has been falling mandeep with caregivers. They are ready to place pt in short to long term care. PT [facility] yr about this time and would like to return. I discussed after STR, what there options were and they are prepared to pay out of pocket at with a longterm and or Memory Care facility, family given SENIOR book and they are reviewing those facilities. Cassy Yeboah @ [facility] states that they will have a bed tomorrow for the pt. PT last PPD per our records is Sept 2019, it has to be less then 1 yr to be acceptable, will check with Cassy Yeboah @ [facility]. Also a rapid COVID, PT eval has been ordered. I spoke with PT department to see if they can come down and see pt in the ED, they will send someone as soon as they can.

## 2020-10-14 NOTE — ED TRIAGE NOTES
Pt masked prior to triage. Pt sent to ED via [facility] by family for decreased appetite and increased falls.

## 2020-10-15 PROBLEM — E83.52 HYPERCALCEMIA: Status: RESOLVED | Noted: 2020-10-14 | Resolved: 2020-10-15

## 2020-10-15 PROBLEM — N17.9 AKI (ACUTE KIDNEY INJURY) (HCC): Status: RESOLVED | Noted: 2020-10-14 | Resolved: 2020-10-15

## 2020-10-15 LAB
25(OH)D3 SERPL-MCNC: 62.5 NG/ML (ref 30–100)
ANION GAP SERPL CALC-SCNC: 8 MMOL/L (ref 7–16)
BASOPHILS # BLD: 0 K/UL (ref 0–0.2)
BASOPHILS NFR BLD: 1 % (ref 0–2)
BUN SERPL-MCNC: 23 MG/DL (ref 8–23)
CALCIUM SERPL-MCNC: 9.6 MG/DL (ref 8.3–10.4)
CHLORIDE SERPL-SCNC: 107 MMOL/L (ref 98–107)
CO2 SERPL-SCNC: 26 MMOL/L (ref 21–32)
COVID-19 RAPID TEST, COVR: NOT DETECTED
CREAT SERPL-MCNC: 1.57 MG/DL (ref 0.6–1)
DIFFERENTIAL METHOD BLD: ABNORMAL
EOSINOPHIL # BLD: 0.1 K/UL (ref 0–0.8)
EOSINOPHIL NFR BLD: 1 % (ref 0.5–7.8)
ERYTHROCYTE [DISTWIDTH] IN BLOOD BY AUTOMATED COUNT: 14.1 % (ref 11.9–14.6)
GLUCOSE SERPL-MCNC: 82 MG/DL (ref 65–100)
HCT VFR BLD AUTO: 33.9 % (ref 35.8–46.3)
HGB BLD-MCNC: 10.7 G/DL (ref 11.7–15.4)
IMM GRANULOCYTES # BLD AUTO: 0 K/UL (ref 0–0.5)
IMM GRANULOCYTES NFR BLD AUTO: 1 % (ref 0–5)
LYMPHOCYTES # BLD: 1.2 K/UL (ref 0.5–4.6)
LYMPHOCYTES NFR BLD: 19 % (ref 13–44)
MCH RBC QN AUTO: 30.3 PG (ref 26.1–32.9)
MCHC RBC AUTO-ENTMCNC: 31.6 G/DL (ref 31.4–35)
MCV RBC AUTO: 96 FL (ref 79.6–97.8)
MM INDURATION POC: 0 MM (ref 0–5)
MONOCYTES # BLD: 0.5 K/UL (ref 0.1–1.3)
MONOCYTES NFR BLD: 8 % (ref 4–12)
NEUTS SEG # BLD: 4.7 K/UL (ref 1.7–8.2)
NEUTS SEG NFR BLD: 71 % (ref 43–78)
NRBC # BLD: 0 K/UL (ref 0–0.2)
PLATELET # BLD AUTO: 137 K/UL (ref 150–450)
PMV BLD AUTO: 10.5 FL (ref 9.4–12.3)
POTASSIUM SERPL-SCNC: 4.1 MMOL/L (ref 3.5–5.1)
PPD POC: NEGATIVE NEGATIVE
RBC # BLD AUTO: 3.53 M/UL (ref 4.05–5.2)
SARS COV-2, XPGCVT: NEGATIVE
SODIUM SERPL-SCNC: 141 MMOL/L (ref 136–145)
SOURCE, COVRS: NORMAL
WBC # BLD AUTO: 6.6 K/UL (ref 4.3–11.1)

## 2020-10-15 PROCEDURE — 2709999900 HC NON-CHARGEABLE SUPPLY

## 2020-10-15 PROCEDURE — 80048 BASIC METABOLIC PNL TOTAL CA: CPT

## 2020-10-15 PROCEDURE — 97110 THERAPEUTIC EXERCISES: CPT

## 2020-10-15 PROCEDURE — 74011000258 HC RX REV CODE- 258: Performed by: INTERNAL MEDICINE

## 2020-10-15 PROCEDURE — 97535 SELF CARE MNGMENT TRAINING: CPT

## 2020-10-15 PROCEDURE — 36415 COLL VENOUS BLD VENIPUNCTURE: CPT

## 2020-10-15 PROCEDURE — 74011250636 HC RX REV CODE- 250/636: Performed by: INTERNAL MEDICINE

## 2020-10-15 PROCEDURE — 97165 OT EVAL LOW COMPLEX 30 MIN: CPT

## 2020-10-15 PROCEDURE — 82306 VITAMIN D 25 HYDROXY: CPT

## 2020-10-15 PROCEDURE — 85025 COMPLETE CBC W/AUTO DIFF WBC: CPT

## 2020-10-15 PROCEDURE — 65270000029 HC RM PRIVATE

## 2020-10-15 PROCEDURE — 74011250637 HC RX REV CODE- 250/637: Performed by: INTERNAL MEDICINE

## 2020-10-15 PROCEDURE — 97161 PT EVAL LOW COMPLEX 20 MIN: CPT

## 2020-10-15 RX ORDER — METOPROLOL SUCCINATE 50 MG/1
50 TABLET, EXTENDED RELEASE ORAL EVERY EVENING
Status: DISCONTINUED | OUTPATIENT
Start: 2020-10-15 | End: 2020-10-16 | Stop reason: HOSPADM

## 2020-10-15 RX ADMIN — Medication 5 ML: at 13:46

## 2020-10-15 RX ADMIN — BUSPIRONE HYDROCHLORIDE 5 MG: 5 TABLET ORAL at 08:55

## 2020-10-15 RX ADMIN — Medication 10 ML: at 18:11

## 2020-10-15 RX ADMIN — QUETIAPINE FUMARATE 50 MG: 25 TABLET ORAL at 17:12

## 2020-10-15 RX ADMIN — CEFTRIAXONE 1 G: 1 INJECTION, POWDER, FOR SOLUTION INTRAMUSCULAR; INTRAVENOUS at 16:15

## 2020-10-15 RX ADMIN — BUSPIRONE HYDROCHLORIDE 5 MG: 5 TABLET ORAL at 17:11

## 2020-10-15 RX ADMIN — DILTIAZEM HYDROCHLORIDE 180 MG: 180 CAPSULE, COATED, EXTENDED RELEASE ORAL at 08:56

## 2020-10-15 RX ADMIN — VENLAFAXINE HYDROCHLORIDE 150 MG: 150 CAPSULE, EXTENDED RELEASE ORAL at 08:57

## 2020-10-15 RX ADMIN — Medication 10 ML: at 22:30

## 2020-10-15 RX ADMIN — MIRTAZAPINE 15 MG: 15 TABLET, FILM COATED ORAL at 22:30

## 2020-10-15 RX ADMIN — METOPROLOL SUCCINATE 50 MG: 50 TABLET, EXTENDED RELEASE ORAL at 17:12

## 2020-10-15 NOTE — PROGRESS NOTES
Care Management Interventions  Transition of Care Consult (CM Consult): Discharge Planning  Physical Therapy Consult: Yes  Occupational Therapy Consult: Yes  Current Support Network: Own Home, Lives Alone, Has Personal Caregivers  Confirm Follow Up Transport: Other (see comment)  The Patient and/or Patient Representative was Provided with a Choice of Provider and Agrees with the Discharge Plan?: Yes  Freedom of Choice List was Provided with Basic Dialogue that Supports the Patient's Individualized Plan of Care/Goals, Treatment Preferences and Shares the Quality Data Associated with the Providers?: Yes  Discharge Location  Discharge Placement: Skilled nursing facility      Chart reviewed. NOVA spoke with pt who is alert & oriented to name, place, sitting at bedside chair. PTA pt was living in her own home, had private caregivers several hrs each morning and then family was there in the evenings. NOVA spoke with pt's son Mili Bayron [phone] ) who is requesting STR at [facility] as pt has been there 2 other times in the past.  Son plans for pt to return home after STR with likely increased time of caregivers. Per Ulices iJang at [facility] they will have a bed on Friday. Pt does not need 3 night stay as Ulices Jiang at [facility] states the waiver of 3 night stay has never ended. PPD & COVID test have both been placed. NOVA sent referral to [facility] via [phone].

## 2020-10-15 NOTE — PROGRESS NOTES
Hospitalist Note     Admit Date:  10/14/2020 12:34 PM   Name:  Emilee Arnold   Age:  80 y.o.  :  1930   MRN:  [id number]   PCP:  Daron Marin MD  Treatment Team: Attending Provider: Adrián Michaud MD; Care Manager: Luba Gagnon, RN; Occupational Therapist: Margarita Nelson OT; Physical Therapist: Jone Vargas, PT; Utilization Review: Saturnino Pope    HPI/Subjective:   Pt is a 81 y/o F with afib, HTN, who presented to ER with urinary changes, falls at home. She lives independently for most part and ambulates with rolling walker. Family assists often. Family says for the last few days urine has been darker, malodorous, with some dysuria. Pt has had decreased appetite and intake, and seems weaker. She was also having more confusion than usual.  She is compliant with meds. Family has a had time getting her to drink water and stay hydrated even at baseline. Admitted with MONICA and UTI.    10/15 - pt feeling better. Still weak. Eating breakfast.  Encouraged PO. No CP, SOB, fevers    No other complaints  Objective:     Patient Vitals for the past 24 hrs:   Temp Pulse Resp BP SpO2   10/15/20 0800 98.1 °F (36.7 °C) 90 16 (!) 150/80 96 %   10/15/20 0303 98 °F (36.7 °C) (!) 109 14 (!) 156/85 96 %   10/14/20 2356 97.6 °F (36.4 °C) 74 18 138/82 90 %   10/14/20 2006 98 °F (36.7 °C) (!) 106 17 (!) 107/57 95 %   10/14/20 1738 97.4 °F (36.3 °C) 98 17 138/66 96 %   10/14/20 1708 98.1 °F (36.7 °C) 68  (!) 148/87 98 %   10/14/20 1630  74  135/80 95 %   10/14/20 1238 97.8 °F (36.6 °C) 88 16 130/70 98 %     Oxygen Therapy  O2 Sat (%): 96 % (10/15/20 0800)  Pulse via Oximetry: 68 beats per minute (10/14/20 1708)  O2 Device: Room air (10/14/20 1959)    Estimated body mass index is 20.97 kg/m² as calculated from the following:    Height as of this encounter: 5' 1\" (1.549 m). Weight as of this encounter: 50.3 kg (111 lb).       Intake/Output Summary (Last 24 hours) at 10/15/2020 [address] [place] [address] filed at 10/14/2020 2006  Gross per 24 hour   Intake 80 ml   Output    Net 80 ml       *Note that automatically entered I/Os may not be accurate; dependent on patient compliance with collection and accurate  by techs. General:    Well nourished. Frail appearing  Alert. CV:   irreglar  Lungs:   No distress, even unlabored  Abdomen:   nondistended. Extremities: No cyanosis or edema. Skin:     No rashes or jaundice. Neuro:  No gross focal deficits    Data Reviewed:  I have reviewed all labs, meds, and studies from the last 24 hours:  Recent Results (from the past 24 hour(s))   PTH INTACT    Collection Time: 10/14/20  1:28 PM   Result Value Ref Range    Calcium 10.5 (H) 8.3 - 10.4 MG/DL    PTH, Intact 39.6 18.5 - 48.3 pg/mL   METABOLIC PANEL, COMPREHENSIVE    Collection Time: 10/14/20  1:29 PM   Result Value Ref Range    Sodium 138 136 - 145 mmol/L    Potassium 4.0 3.5 - 5.1 mmol/L    Chloride 103 98 - 107 mmol/L    CO2 28 21 - 32 mmol/L    Anion gap 7 7 - 16 mmol/L    Glucose 91 65 - 100 mg/dL    BUN 25 (H) 8 - 23 MG/DL    Creatinine 1.89 (H) 0.6 - 1.0 MG/DL    GFR est AA 32 (L) >60 ml/min/1.73m2    GFR est non-AA 27 (L) >60 ml/min/1.73m2    Calcium 10.5 (H) 8.3 - 10.4 MG/DL    Bilirubin, total 0.5 0.2 - 1.1 MG/DL    ALT (SGPT) 21 12 - 65 U/L    AST (SGOT) 37 15 - 37 U/L    Alk.  phosphatase 127 50 - 136 U/L    Protein, total 7.6 6.3 - 8.2 g/dL    Albumin 3.6 3.2 - 4.6 g/dL    Globulin 4.0 (H) 2.3 - 3.5 g/dL    A-G Ratio 0.9 (L) 1.2 - 3.5     MAGNESIUM    Collection Time: 10/14/20  1:29 PM   Result Value Ref Range    Magnesium 2.4 1.8 - 2.4 mg/dL   URINALYSIS W/ RFLX MICROSCOPIC    Collection Time: 10/14/20  2:02 PM   Result Value Ref Range    Color YELLOW      Appearance CLOUDY      Specific gravity 1.019 1.001 - 1.023      pH (UA) 7.0 5.0 - 9.0      Protein TRACE (A) NEG mg/dL    Glucose Negative mg/dL    Ketone Negative NEG mg/dL    Bilirubin Negative NEG      Blood Negative NEG      Urobilinogen 0.2 0.2 - 1.0 EU/dL    Nitrites Positive (A) NEG      Leukocyte Esterase MODERATE (A) NEG      WBC  0 /hpf    RBC 5-10 0 /hpf    Epithelial cells 0 0 /hpf    Bacteria 4+ (H) 0 /hpf    Casts 0-3 0 /lpf   CULTURE, URINE    Collection Time: 10/14/20  2:02 PM    Specimen: Cath Urine   Result Value Ref Range    Special Requests: NO SPECIAL REQUESTS      Culture result:        NO GROWTH AFTER SHORT PERIOD OF INCUBATION. FURTHER RESULTS TO FOLLOW AFTER OVERNIGHT INCUBATION.    CBC W/O DIFF    Collection Time: 10/14/20  2:41 PM   Result Value Ref Range    WBC 7.9 4.3 - 11.1 K/uL    RBC 3.79 (L) 4.05 - 5.2 M/uL    HGB 11.8 11.7 - 15.4 g/dL    HCT 36.6 35.8 - 46.3 %    MCV 96.6 79.6 - 97.8 FL    MCH 31.1 26.1 - 32.9 PG    MCHC 32.2 31.4 - 35.0 g/dL    RDW 13.8 11.9 - 14.6 %    PLATELET 061 548 - 977 K/uL    MPV 10.9 9.4 - 12.3 FL    ABSOLUTE NRBC 0.00 0.0 - 0.2 K/uL   SARS-COV-2    Collection Time: 10/14/20  4:29 PM   Result Value Ref Range    Specimen source Nasopharyngeal      COVID-19 rapid test Not detected NOTD      SARS CoV-2 PENDING    METABOLIC PANEL, BASIC    Collection Time: 10/15/20  4:31 AM   Result Value Ref Range    Sodium 141 136 - 145 mmol/L    Potassium 4.1 3.5 - 5.1 mmol/L    Chloride 107 98 - 107 mmol/L    CO2 26 21 - 32 mmol/L    Anion gap 8 7 - 16 mmol/L    Glucose 82 65 - 100 mg/dL    BUN 23 8 - 23 MG/DL    Creatinine 1.57 (H) 0.6 - 1.0 MG/DL    GFR est AA 40 (L) >60 ml/min/1.73m2    GFR est non-AA 33 (L) >60 ml/min/1.73m2    Calcium 9.6 8.3 - 10.4 MG/DL   CBC WITH AUTOMATED DIFF    Collection Time: 10/15/20  4:31 AM   Result Value Ref Range    WBC 6.6 4.3 - 11.1 K/uL    RBC 3.53 (L) 4.05 - 5.2 M/uL    HGB 10.7 (L) 11.7 - 15.4 g/dL    HCT 33.9 (L) 35.8 - 46.3 %    MCV 96.0 79.6 - 97.8 FL    MCH 30.3 26.1 - 32.9 PG    MCHC 31.6 31.4 - 35.0 g/dL    RDW 14.1 11.9 - 14.6 %    PLATELET 182 (L) 490 - 450 K/uL    MPV 10.5 9.4 - 12.3 FL    ABSOLUTE NRBC 0.00 0.0 - 0.2 K/uL    DF AUTOMATED      NEUTROPHILS 71 43 - 78 %    LYMPHOCYTES 19 13 - 44 %    MONOCYTES 8 4.0 - 12.0 %    EOSINOPHILS 1 0.5 - 7.8 %    BASOPHILS 1 0.0 - 2.0 %    IMMATURE GRANULOCYTES 1 0.0 - 5.0 %    ABS. NEUTROPHILS 4.7 1.7 - 8.2 K/UL    ABS. LYMPHOCYTES 1.2 0.5 - 4.6 K/UL    ABS. MONOCYTES 0.5 0.1 - 1.3 K/UL    ABS. EOSINOPHILS 0.1 0.0 - 0.8 K/UL    ABS. BASOPHILS 0.0 0.0 - 0.2 K/UL    ABS. IMM.  GRANS. 0.0 0.0 - 0.5 K/UL   VITAMIN D, 25 HYDROXY    Collection Time: 10/15/20  4:31 AM   Result Value Ref Range    Vitamin D 25-Hydroxy 62.5 30.0 - 100.0 ng/mL       Current Meds:  Current Facility-Administered Medications   Medication Dose Route Frequency    tuberculin injection 5 Units  5 Units IntraDERMal ONCE    dilTIAZem ER (CARDIZEM CD) capsule 180 mg  180 mg Oral DAILY    metoprolol succinate (TOPROL-XL) XL tablet 50 mg  50 mg Oral QPM    mirtazapine (REMERON) tablet 15 mg  15 mg Oral QHS    QUEtiapine (SEROquel) tablet 50 mg  50 mg Oral QPM    venlafaxine-SR (EFFEXOR-XR) capsule 150 mg  150 mg Oral DAILY    busPIRone (BUSPAR) tablet 5 mg  5 mg Oral BID    cefTRIAXone (ROCEPHIN) 1 g in 0.9% sodium chloride (MBP/ADV) 50 mL  1 g IntraVENous Q24H    sodium chloride (NS) flush 5-40 mL  5-40 mL IntraVENous Q8H    sodium chloride (NS) flush 5-40 mL  5-40 mL IntraVENous PRN    potassium chloride 10 mEq in 100 ml IVPB  10 mEq IntraVENous PRN    magnesium sulfate 2 g/50 ml IVPB (premix or compounded)  2 g IntraVENous PRN    acetaminophen (TYLENOL) tablet 650 mg  650 mg Oral Q6H PRN    Or    acetaminophen (TYLENOL) suppository 650 mg  650 mg Rectal Q6H PRN    polyethylene glycol (MIRALAX) packet 17 g  17 g Oral DAILY PRN    bisacodyL (DULCOLAX) suppository 10 mg  10 mg Rectal DAILY PRN    ondansetron (ZOFRAN ODT) tablet 4 mg  4 mg Oral Q8H PRN    Or    ondansetron (ZOFRAN) injection 4 mg  4 mg IntraVENous Q6H PRN    famotidine (PEPCID) tablet 20 mg  20 mg Oral BID PRN    0.9% sodium chloride infusion  50 mL/hr IntraVENous CONTINUOUS       Other Studies:  No results found for this visit on 10/14/20. Ct Head Wo Cont    Result Date: 10/14/2020  CT Brain dated 10/14/2020  Comparison: 2/27/2020 Clinical Information:  Recent falls  5 mm axial images were obtained from skull base to vertex without contrast. Radiation dose reduction techniques were used for this study. Our scanners use one or all of the following:  Automated exposure control, adjustment of the mA and/or kV according to patient size, iterative reconstruction. Findings: Ventricles, sulci and cisterns are normal in size. No midline shift. Ill-defined hypodensity in the cerebral white matter is consistent chronic ischemic white matter change. No hemorrhage, mass, mass effect or acute territorial infarction. No extra-axial abnormality. No skull fracture. Mastoid air cells and visualized paranasal sinuses are aerated and unremarkable. Impression: No Acute Abnormality     Xr Chest Port    Result Date: 10/14/2020  Portable AP upright chest dated 10/14/2020 at 1259 hours Prior chest x-ray 9/18/2019 CLINICAL INFORMATION: Decreased appetite, recent falls Heart is enlarged and thoracic aorta mildly tortuous. Pulmonary vascularity is normal and lungs are clear. No pleural effusion. There is elevation right diaphragm. This is unchanged. IMPRESSION: No acute abnormality      All Micro Results     Procedure Component Value Units Date/Time    CULTURE, URINE [[id number]] Collected:  10/14/20 1402    Order Status:  Completed Specimen:  Cath Urine Updated:  10/15/20 0702     Special Requests: NO SPECIAL REQUESTS        Culture result:       NO GROWTH AFTER SHORT PERIOD OF INCUBATION. FURTHER RESULTS TO FOLLOW AFTER OVERNIGHT INCUBATION.                 SARS-CoV-2 Lab Results  \"Novel Coronavirus\" Test: No results found for: COV2NT   \"Emergent Disease\" Test: No results found for: EDPR  \"SARS-COV-2\" Test: No results found for: XGCOVT  Rapid Test:   Lab Results   Component Value Date/Time    COVR Not detected 10/14/2020 04:29 PM            Assessment and Plan:     Hospital Problems as of 10/15/2020 Never Reviewed          Codes Class Noted - Resolved POA    * (Principal) MONICA (acute kidney injury) ([facility] [address] ICD-10-CM: N17.9  ICD-9-CM: 584.9  10/14/2020 - Present Yes        UTI (urinary tract infection) ICD-10-CM: N39.0  ICD-9-CM: 599.0  10/14/2020 - Present Yes        Hypercalcemia ICD-10-CM: C17.61  ICD-9-CM: 275.42  10/14/2020 - Present Yes        Paroxysmal atrial fibrillation (HCC) (Chronic) ICD-10-CM: I48.0  ICD-9-CM: 427.31  9/19/2019 - Present Yes        Depression with anxiety (Chronic) ICD-10-CM: F41.8  ICD-9-CM: 300.4  9/19/2019 - Present Yes        HTN (hypertension) (Chronic) ICD-10-CM: I10  ICD-9-CM: 401.9  9/16/2019 - Present Yes              Plan:  UTI  -cont rocpehin  -follow cxs    Weakness  -PT/OT    AoCKD  -resolved.   Reduce IVF to low rate and monitor    HTN, PAF  -cont home meds     DC planning/Dispo:    -work on placement    Diet:  DIET PUREED  DVT ppx:  SCDs    Signed:  Salomon Barba MD

## 2020-10-15 NOTE — PROGRESS NOTES
Problem: Falls - Risk of  Goal: *Absence of Falls  Description: Document Edmonia Morning Fall Risk and appropriate interventions in the flowsheet. Outcome: Progressing Towards Goal  Note: Fall Risk Interventions:  Mobility Interventions: Bed/chair exit alarm, Communicate number of staff needed for ambulation/transfer, Patient to call before getting OOB    Mentation Interventions: Bed/chair exit alarm, Adequate sleep, hydration, pain control, Door open when patient unattended, Evaluate medications/consider consulting pharmacy, Reorient patient, Room close to nurse's station    Medication Interventions: Bed/chair exit alarm, Evaluate medications/consider consulting pharmacy, Patient to call before getting OOB, Teach patient to arise slowly    Elimination Interventions: Bed/chair exit alarm, Call light in reach, Patient to call for help with toileting needs, Toilet paper/wipes in reach    History of Falls Interventions: Bed/chair exit alarm, Consult care management for discharge planning, Evaluate medications/consider consulting pharmacy, Door open when patient unattended, Investigate reason for fall, Room close to nurse's station         Problem: Pressure Injury - Risk of  Goal: *Prevention of pressure injury  Description: Document Pietro Scale and appropriate interventions in the flowsheet.   Outcome: Progressing Towards Goal  Note: Pressure Injury Interventions:  Sensory Interventions: Assess changes in LOC, Maintain/enhance activity level, Pressure redistribution bed/mattress (bed type)    Moisture Interventions: Absorbent underpads, Maintain skin hydration (lotion/cream)    Activity Interventions: Increase time out of bed, Pressure redistribution bed/mattress(bed type), PT/OT evaluation    Mobility Interventions: HOB 30 degrees or less, Pressure redistribution bed/mattress (bed type), PT/OT evaluation    Nutrition Interventions: Offer support with meals,snacks and hydration, Document food/fluid/supplement intake    Friction and Shear Interventions: HOB 30 degrees or less

## 2020-10-15 NOTE — PROGRESS NOTES
Problem: Mobility Impaired (Adult and Pediatric)  Goal: *Acute Goals and Plan of Care (Insert Text)  Outcome: Progressing Towards Goal  Note: STG:  (1.)Ms. Lzu Branham will move from supine to sit and sit to supine with STAND BY ASSIST within 1-3 treatment day(s). (2.)Ms. Luz Branham will transfer from bed to chair and chair to bed with MINIMAL ASSIST using the least restrictive device within 1-3 treatment day(s). (3.)Ms. Montgomery will ambulate with MINIMAL ASSIST for 20 feet with the least restrictive device within 1-3 treatment day(s). LTG:  (1.)Ms. Luz Branham will move from supine to sit and sit to supine in bed with MODIFIED INDEPENDENCE within 4-6 treatment day(s). (2.)Ms. Luz Branham will transfer from bed to chair and chair to bed with CONTACT GUARD ASSIST using the least restrictive device within 4-6 treatment day(s). (3.)Ms. Montgomery will ambulate with MINIMAL ASSIST for 50 feet with the least restrictive device within 4-6 treatment day(s). ________________________________________________________________________________________________      PHYSICAL THERAPY: Initial Assessment 10/15/2020  INPATIENT: PT Visit Days : 1  [place] / [place] / Product Type: Medicare /       NAME/AGE/GENDER: Dwayne Mendoza is a [address] y.o. female   PRIMARY DIAGNOSIS: MONICA (acute kidney injury) ([facility] [address] [N17.9] MONICA (acute kidney injury) ([facility] [address] MONICA (acute kidney injury) ([facility] [address]        ICD-10: Treatment Diagnosis:    Generalized Muscle Weakness (M62.81)  Difficulty in walking, Not elsewhere classified (R26.2)  Other abnormalities of gait and mobility (R26.89)  Repeated Falls (R29.6)   Precaution/Allergies:  Patient has no known allergies. ASSESSMENT:     Ms. Luz Branham presents with generalized weakness, history of falls, and decreased functional mobility. She is pleasant but confused; oriented to person only. She lives alone and has sitters as well as family assistance.  She was shaking and unsteady today with short gait distance. Per chart, she has had multiple falls over the last few weeks even with others assisting her at time of fall. She will benefit from skilled therapy services to address the below problem list.    This section established at most recent assessment   PROBLEM LIST (Impairments causing functional limitations):  Decreased Strength  Decreased ADL/Functional Activities  Decreased Transfer Abilities  Decreased Ambulation Ability/Technique  Decreased Balance  Decreased Activity Tolerance  Decreased Cognition   INTERVENTIONS PLANNED: (Benefits and precautions of physical therapy have been discussed with the patient.)  Bed Mobility  Gait Training  Therapeutic Activites  Therapeutic Exercise/Strengthening  Transfer Training     TREATMENT PLAN: Frequency/Duration: daily for duration of hospital stay  Rehabilitation Potential For Stated Goals:  Fair to good     REHAB RECOMMENDATIONS (at time of discharge pending progress):    Placement: It is my opinion, based on this patient's performance to date, that Ms. Montgomery may benefit from intensive therapy at a [address] [place] [address] after discharge due to the functional deficits listed above that are likely to improve with skilled rehabilitation and concerns that he/she may be unsafe to be unsupervised at home due to recent history of frequent falls . Equipment:   None at this time              HISTORY:   History of Present Injury/Illness (Reason for Referral):  PER H&P: Pt is a 79 y/o F with afib, HTN, who presented to ER with urinary changes, falls at home. She lives independently for most part and ambulates with rolling walker. Family assists often. Family says for the last few days urine has been darker, malodorous, with some dysuria. Pt has had decreased appetite and intake, and seems weaker.   Has fallen several times in few days which is more than usual.  She is also having more confusion than usual.  She is compliant with meds.  Family has a had time getting her to drink water and stay hydrated even at baseline. She reported chills yesterday also. No CP, SOB, cough, diarrhea, n/v, fevers. Past Medical History/Comorbidities:   Ms. Natan Cuenca  has a past medical history of ADHD, Arthritis, Dementia ([address] and Hypertension. Ms. Natan Cuenac  has no past surgical history on file. Social History/Living Environment:   Home Environment: Private residence  One/Two Story Residence: Two story, live on 1st floor  Living Alone: Yes  Support Systems: Child(nura)(sitters)  Patient Expects to be Discharged to[de-identified] Skilled nursing facility  Current DME Used/Available at Home: Blood pressure cuff, Commode, bedside, Shower chair, Walker, rollator  Prior Level of Function/Work/Activity:  Ambulating short distances with with rollator and assistance     Number of Personal Factors/Comorbidities that affect the Plan of Care: 1-2: MODERATE COMPLEXITY   EXAMINATION:   Most Recent Physical Functioning:   Gross Assessment:  AROM: Generally decreased, functional  Strength: Generally decreased, functional               Posture:  Posture Assessment: Forward head, Rounded shoulders, Trunk flexion  Balance:  Sitting: Intact  Standing: Pull to stand; With support Bed Mobility:  Supine to Sit: Contact guard assistance  Sit to Supine: (left up on [facility] with CNA)  Wheelchair Mobility:     Transfers:  Sit to Stand: Minimum assistance;Assist x1;Assist x2  Stand to Sit: Minimum assistance  Bed to Chair: Minimum assistance  Gait:     Speed/Romina: Pace decreased (<100 feet/min)  Gait Abnormalities: Decreased step clearance;Shuffling gait  Distance (ft): 5 Feet (ft)  Assistive Device: Gait belt;Walker, rolling  Ambulation - Level of Assistance: Minimal assistance; Additional time;Assist x1;Assist x2  Interventions: Manual cues; Safety awareness training;Verbal cues      Body Structures Involved:  Muscles Body Functions Affected:  Genitourinary  Neuromusculoskeletal  Movement Related Activities and Participation Affected:  General Tasks and Demands  Mobility   Number of elements that affect the Plan of Care: 3: MODERATE COMPLEXITY   CLINICAL PRESENTATION:   Presentation: Evolving clinical presentation with changing clinical characteristics: MODERATE COMPLEXITY   CLINICAL DECISION MAKIN [facility] Inpatient Short Form  How much difficulty does the patient currently have. .. Unable A Lot A Little None   1. Turning over in bed (including adjusting bedclothes, sheets and blankets)? [] 1   [] 2   [x] 3   [] 4   2. Sitting down on and standing up from a chair with arms ( e.g., wheelchair, bedside commode, etc.)   [] 1   [] 2   [x] 3   [] 4   3. Moving from lying on back to sitting on the side of the bed? [] 1   [] 2   [x] 3   [] 4   How much help from another person does the patient currently need. .. Total A Lot A Little None   4. Moving to and from a bed to a chair (including a wheelchair)? [] 1   [] 2   [x] 3   [] 4   5. Need to walk in hospital room? [] 1   [x] 2   [] 3   [] 4   6. Climbing 3-5 steps with a railing? [] 1   [x] 2   [] 3   [] 4   © , Trustees of [address] [place] [address] [address] [id number], under license to "[employer]. All rights reserved      Score:  Initial: 16 Most Recent: X (Date: -- )    Interpretation of Tool:  Represents activities that are increasingly more difficult (i.e. Bed mobility, Transfers, Gait). Medical Necessity:     Patient demonstrates   Fair to good   rehab potential due to higher previous functional level. Reason for Services/Other Comments:  Patient   continues to require present interventions due to patient's inability to perform exercises and functional mobility safely and independently  .    Use of outcome tool(s) and clinical judgement create a POC that gives a: Clear prediction of patient's progress: LOW COMPLEXITY            TREATMENT:   (In addition to Assessment/Re-Assessment sessions the following treatments were rendered)   Pre-treatment Symptoms/Complaints:  no pain  Pain: Initial:   Pain Intensity 1: 0  Post Session:  0     Therapeutic Exercise: (8 Minutes):  Exercises per grid below to improve mobility and strength. Required minimal verbal and manual cues to  perform exercises correctly . Date:  10/15/20 Date:   Date:     Activity/Exercise Parameters Parameters Parameters   Ankle pumps 10     Long arc quads 10     Seated marching 10     Hip add/abduction 10 AA     Sit to stand  5                     Braces/Orthotics/Lines/Etc:   IV  O2 Device: Room air  Treatment/Session Assessment:    Response to Treatment:  tolerated fairly well  Interdisciplinary Collaboration:   Physical Therapist  Occupational Therapist  Registered Nurse  Certified Nursing Assistant/Patient Care Technician  After treatment position/precautions:   Caregiver at bedside  Call light within reach  Left sitting up on bedside commode with CNA; preparing for sponge bath    Compliance with Program/Exercises: Compliant most of the time, Will assess as treatment progresses  Recommendations/Intent for next treatment session: \"Next visit will focus on advancements to more challenging activities\".   Total Treatment Duration:  PT Patient Time In/Time Out  Time In: 9074  Time Out: [address], PT

## 2020-10-15 NOTE — PROGRESS NOTES
END OF SHIFT NOTE:  Patient out of bed in recliner greater part of morning. No complain of pain or nausea. Fair po intake and Family in for visit. Intake/Output  10/15 0701 - 10/15 1900  In: 838 [P.O.:838]  Out: 200 [Urine:200]   Voiding: Yes   Catheter:  No  Drain:              Stool:  One  occurrences. Stool Assessment  Stool Color: Spike Crosser (10/15/20 1346)  Stool Appearance: Soft (10/15/20 1346)  Stool Amount: Large (10/15/20 1346)  Stool Source/Status: Rectum (10/15/20 1346)    Emesis:  No occurrences. VITAL SIGNS  Patient Vitals for the past 12 hrs:   Temp Pulse Resp BP SpO2   10/15/20 1526 98.1 °F (36.7 °C) (!) 111 17 (!) 147/71 92 %   10/15/20 1135 97.6 °F (36.4 °C) 83 17 (!) 148/98 98 %   10/15/20 0800 98.1 °F (36.7 °C) 90 16 (!) 150/80 96 %       Pain Assessment  Pain 1  Pain Scale 1: Visual (10/15/20 1811)  Pain Intensity 1: 0 (10/15/20 1811)  Patient Stated Pain Goal: 0 (10/14/20 1959)    Ambulating  From recliner  to bed     Additional Information:  See above     Shift report given to oncoming nurse Santo Mejía RN  at the bedside.     bAigail Miller RN

## 2020-10-15 NOTE — PROGRESS NOTES
Problem: Self Care Deficits Care Plan (Adult)  Goal: *Acute Goals and Plan of Care (Insert Text)  Description: 1. Patient will perform grooming with minimal assistance. 2. Patient will perform Upper body dressing with min assist.   3. Patient will perform lower body dressing with min assist.  4. Patient will perform upper and lower body bathing with min assist.  5. Patient will perform toilet transfers with CGA. 6. Patient will perform shower transfer with CGA. 7. Patient will participate in 30 + minutes of ADL/ therapeutic exercise/therapeutic activity with min rest breaks to increase activity tolerance for self care. 8. Patient will perform ADL functional mobility in room with CGA. Goals to be achieved in 7 days. Outcome: Progressing Towards Goal     OCCUPATIONAL THERAPY: Initial Assessment, Daily Note, and AM 10/15/2020  INPATIENT:    [place] / Plan[place] / Product Type: Medicare /      NAME/AGE/GENDER: Darcy Miranda is a 80 y.o. female   PRIMARY DIAGNOSIS:  MONICA (acute kidney injury) ([facility] [address] [N17.9] MONICA (acute kidney injury) ([facility] [address] MONICA (acute kidney injury) ([facility] [address]        ICD-10: Treatment Diagnosis:    Generalized Muscle Weakness (M62.81)  Other lack of cordination (R27.8)  Difficulty in walking, Not elsewhere classified (R26.2)   Precautions/Allergies:     Patient has no known allergies. ASSESSMENT:     Ms. Jared Oconnor admitted with above diagnosis. Pt confused and presents with decreased self care and functional mobility. Pt would benefit from skilled OT to increase independence. Pt completed toileting and functional mobility this am. Pt left with CNA.      This section established at most recent assessment   PROBLEM LIST (Impairments causing functional limitations):  Decreased Strength  Decreased ADL/Functional Activities  Decreased Transfer Abilities  Decreased Ambulation Ability/Technique  Decreased Balance  Decreased Activity Tolerance   INTERVENTIONS PLANNED: (Benefits and precautions of occupational therapy have been discussed with the patient.)  Activities of daily living training  Adaptive equipment training  Balance training  Clothing management  Therapeutic activity  Therapeutic exercise     TREATMENT PLAN: Frequency/Duration: Follow patient 3x/week to address above goals. Rehabilitation Potential For Stated Goals: Good     REHAB RECOMMENDATIONS (at time of discharge pending progress):    Placement: It is my opinion, based on this patient's performance to date, that Ms. Yumiko Maddox may benefit from participating in 1-2 additional therapy sessions in order to continue to assess for rehab potential and then make recommendation for disposition at discharge. Equipment:   None at this time              OCCUPATIONAL PROFILE AND HISTORY:   History of Present Injury/Illness (Reason for Referral): MONICA  Past Medical History/Comorbidities:   Ms. Yumiko Maddox  has a past medical history of ADHD, Arthritis, Dementia ([address] and Hypertension. Ms. Yumiko Maddox  has no past surgical history on file. Social History/Living Environment:   Home Environment: Private residence  One/Two Story Residence: Two story, live on 1st floor  Living Alone: Yes  Support Systems: Child(nura)(sitters)  Patient Expects to be Discharged to[de-identified] Skilled nursing facility  Current DME Used/Available at Home: Blood pressure cuff, Commode, bedside, Shower chair, Walker, rollator  Prior Level of Function/Work/Activity:assist with ADLs and mobility. Number of Personal Factors/Comorbidities that affect the Plan of Care: Brief history (0):  LOW COMPLEXITY   ASSESSMENT OF OCCUPATIONAL PERFORMANCE[de-identified]   Activities of Daily Living:   Basic ADLs (From Assessment) Complex ADLs (From Assessment)   Feeding: Moderate assistance  Oral Facial Hygiene/Grooming: Moderate assistance  Bathing: Moderate assistance, Maximum assistance  Upper Body Dressing: Moderate assistance  Lower Body Dressing:  Moderate assistance  Toileting: Setup     Grooming/Bathing/Dressing Activities of Daily Living     Cognitive Retraining  Safety/Judgement: Fall prevention                 Functional Transfers  Bathroom Mobility: Minimum assistance  Toilet Transfer : Minimum assistance  Shower Transfer: Minimum assistance     Bed/Mat Mobility  Supine to Sit: Contact guard assistance  Sit to Supine: (left up on [facility] with CNA)  Sit to Stand: Minimum assistance  Stand to Sit: Minimum assistance  Bed to Chair: Minimum assistance  Scooting: Contact guard assistance     Most Recent Physical Functioning:   Gross Assessment:  AROM: Generally decreased, functional(BUE)  Strength: Generally decreased, functional(BUE)  Coordination: Generally decreased, functional(BUE)  Tone: Normal  Sensation: Intact               Posture:  Posture Assessment:  Forward head, Rounded shoulders, Trunk flexion  Balance:  Sitting: Intact  Standing: With support Bed Mobility:  Supine to Sit: Contact guard assistance  Sit to Supine: (left up on [facility] with CNA)  Scooting: Contact guard assistance  Wheelchair Mobility:     Transfers:  Sit to Stand: Minimum assistance  Stand to Sit: Minimum assistance  Bed to Chair: Minimum assistance            Patient Vitals for the past 6 hrs:   BP SpO2 Pulse   10/15/20 0800 (!) 150/80 96 % 90       Mental Status  Neurologic State: Alert  Orientation Level: Oriented X4  Cognition: Follows commands  Perception: Appears intact  Perseveration: No perseveration noted  Safety/Judgement: Fall prevention                          Physical Skills Involved:  Balance  Strength  Activity Tolerance Cognitive Skills Affected (resulting in the inability to perform in a timely and safe manner):  Perception  Executive Function  Immediate Memory  Short Term Recall  Long Term Memory  Comprehension Psychosocial Skills Affected:  Habits/Routines  Social Interaction   Number of elements that affect the Plan of Care: 1-3:  LOW COMPLEXITY   CLINICAL DECISION MAKING: [address] [place] [address] AM-PAC 6 Clicks   Daily Activity Inpatient Short Form  How much help from another person does the patient currently need. .. Total A Lot A Little None   1. Putting on and taking off regular lower body clothing? [] 1   [x] 2   [] 3   [] 4   2. Bathing (including washing, rinsing, drying)? [] 1   [x] 2   [] 3   [] 4   3. Toileting, which includes using toilet, bedpan or urinal?   [] 1   [x] 2   [] 3   [] 4   4. Putting on and taking off regular upper body clothing? [] 1   [x] 2   [] 3   [] 4   5. Taking care of personal grooming such as brushing teeth? [] 1   [] 2   [x] 3   [] 4   6. Eating meals? [] 1   [] 2   [x] 3   [] 4   © 2007, Trustees of [address] [place] [address], under license to [employer]. All rights reserved      Score:  Initial: 14 Most Recent: X (Date: -- )    Interpretation of Tool:  Represents activities that are increasingly more difficult (i.e. Bed mobility, Transfers, Gait). Medical Necessity:     Patient is expected to demonstrate progress in   strength, balance, coordination, and functional technique   to   increase independence with self care and functional mobility   . Reason for Services/Other Comments:  Patient continues to require skilled intervention due to   Decrease self care and functional mobility   . Use of outcome tool(s) and clinical judgement create a POC that gives a: LOW COMPLEXITY         TREATMENT:   (In addition to Assessment/Re-Assessment sessions the following treatments were rendered)     Pre-treatment Symptoms/Complaints:  tolerated functional mobility   Pain: Initial:   Pain Intensity 1: 0  Post Session:  0     Self Care: (10): Procedure(s) (per grid) utilized to improve and/or restore self-care/home management as related to toileting and functional mobility  . Required minimal verbal and   cueing to facilitate activities of daily living skills and compensatory activities.     Assessment/Reassessment (5)    Braces/Orthotics/Lines/Etc: O2 Device: Room air  Treatment/Session Assessment:    Response to Treatment:  tolerated well  Interdisciplinary Collaboration:   Physical Therapist  Occupational Therapist  Registered Nurse  After treatment position/precautions:   Pt left with CNA    Compliance with Program/Exercises: Compliant all of the time. Recommendations/Intent for next treatment session: \"Next visit will focus on advancements to more challenging activities and reduction in assistance provided\".   Total Treatment Duration:  OT Patient Time In/Time Out  Time In: 0910  Time Out: [id number] [facility] [employer] [facility]

## 2020-10-16 VITALS
RESPIRATION RATE: 16 BRPM | HEIGHT: 61 IN | TEMPERATURE: 98 F | SYSTOLIC BLOOD PRESSURE: 141 MMHG | WEIGHT: 111 LBS | HEART RATE: 83 BPM | OXYGEN SATURATION: 96 % | BODY MASS INDEX: 20.96 KG/M2 | DIASTOLIC BLOOD PRESSURE: 93 MMHG

## 2020-10-16 LAB
ANION GAP SERPL CALC-SCNC: 7 MMOL/L (ref 7–16)
BASOPHILS # BLD: 0 K/UL (ref 0–0.2)
BASOPHILS NFR BLD: 1 % (ref 0–2)
BUN SERPL-MCNC: 23 MG/DL (ref 8–23)
CALCIUM SERPL-MCNC: 9.3 MG/DL (ref 8.3–10.4)
CHLORIDE SERPL-SCNC: 109 MMOL/L (ref 98–107)
CO2 SERPL-SCNC: 25 MMOL/L (ref 21–32)
CREAT SERPL-MCNC: 1.52 MG/DL (ref 0.6–1)
DIFFERENTIAL METHOD BLD: ABNORMAL
EOSINOPHIL # BLD: 0.1 K/UL (ref 0–0.8)
EOSINOPHIL NFR BLD: 2 % (ref 0.5–7.8)
ERYTHROCYTE [DISTWIDTH] IN BLOOD BY AUTOMATED COUNT: 14.1 % (ref 11.9–14.6)
GLUCOSE SERPL-MCNC: 83 MG/DL (ref 65–100)
HCT VFR BLD AUTO: 35.6 % (ref 35.8–46.3)
HGB BLD-MCNC: 11.1 G/DL (ref 11.7–15.4)
IMM GRANULOCYTES # BLD AUTO: 0 K/UL (ref 0–0.5)
IMM GRANULOCYTES NFR BLD AUTO: 1 % (ref 0–5)
LYMPHOCYTES # BLD: 1.7 K/UL (ref 0.5–4.6)
LYMPHOCYTES NFR BLD: 23 % (ref 13–44)
MCH RBC QN AUTO: 31.3 PG (ref 26.1–32.9)
MCHC RBC AUTO-ENTMCNC: 31.2 G/DL (ref 31.4–35)
MCV RBC AUTO: 100.3 FL (ref 79.6–97.8)
MONOCYTES # BLD: 0.5 K/UL (ref 0.1–1.3)
MONOCYTES NFR BLD: 7 % (ref 4–12)
NEUTS SEG # BLD: 5.1 K/UL (ref 1.7–8.2)
NEUTS SEG NFR BLD: 68 % (ref 43–78)
NRBC # BLD: 0 K/UL (ref 0–0.2)
PLATELET # BLD AUTO: 89 K/UL (ref 150–450)
PMV BLD AUTO: 11.8 FL (ref 9.4–12.3)
POTASSIUM SERPL-SCNC: 4.2 MMOL/L (ref 3.5–5.1)
RBC # BLD AUTO: 3.55 M/UL (ref 4.05–5.2)
SODIUM SERPL-SCNC: 141 MMOL/L (ref 136–145)
WBC # BLD AUTO: 7.6 K/UL (ref 4.3–11.1)

## 2020-10-16 PROCEDURE — 74011250637 HC RX REV CODE- 250/637: Performed by: INTERNAL MEDICINE

## 2020-10-16 PROCEDURE — 97530 THERAPEUTIC ACTIVITIES: CPT

## 2020-10-16 PROCEDURE — 85025 COMPLETE CBC W/AUTO DIFF WBC: CPT

## 2020-10-16 PROCEDURE — 36415 COLL VENOUS BLD VENIPUNCTURE: CPT

## 2020-10-16 PROCEDURE — 80048 BASIC METABOLIC PNL TOTAL CA: CPT

## 2020-10-16 RX ORDER — BENAZEPRIL HYDROCHLORIDE AND HYDROCHLOROTHIAZIDE 10; 12.5 MG/1; MG/1
1 TABLET ORAL DAILY
COMMUNITY

## 2020-10-16 RX ORDER — HYDROCHLOROTHIAZIDE 25 MG/1
12.5 TABLET ORAL DAILY
Status: DISCONTINUED | OUTPATIENT
Start: 2020-10-16 | End: 2020-10-16 | Stop reason: HOSPADM

## 2020-10-16 RX ORDER — CEFPODOXIME PROXETIL 200 MG/1
200 TABLET, FILM COATED ORAL 2 TIMES DAILY
Qty: 10 TAB | Refills: 0 | Status: SHIPPED
Start: 2020-10-16 | End: 2020-10-16 | Stop reason: SDUPTHER

## 2020-10-16 RX ORDER — CEFPODOXIME PROXETIL 200 MG/1
200 TABLET, FILM COATED ORAL ONCE
Status: COMPLETED | OUTPATIENT
Start: 2020-10-16 | End: 2020-10-16

## 2020-10-16 RX ORDER — LISINOPRIL 5 MG/1
10 TABLET ORAL DAILY
Status: DISCONTINUED | OUTPATIENT
Start: 2020-10-16 | End: 2020-10-16 | Stop reason: HOSPADM

## 2020-10-16 RX ORDER — CEFPODOXIME PROXETIL 200 MG/1
200 TABLET, FILM COATED ORAL
Qty: 5 TAB | Refills: 0 | Status: SHIPPED
Start: 2020-10-16 | End: 2020-10-21

## 2020-10-16 RX ADMIN — Medication 10 ML: at 13:13

## 2020-10-16 RX ADMIN — LISINOPRIL 10 MG: 5 TABLET ORAL at 12:39

## 2020-10-16 RX ADMIN — Medication 10 ML: at 05:30

## 2020-10-16 RX ADMIN — VENLAFAXINE HYDROCHLORIDE 150 MG: 150 CAPSULE, EXTENDED RELEASE ORAL at 08:41

## 2020-10-16 RX ADMIN — HYDROCHLOROTHIAZIDE 12.5 MG: 25 TABLET ORAL at 12:40

## 2020-10-16 RX ADMIN — BUSPIRONE HYDROCHLORIDE 5 MG: 5 TABLET ORAL at 08:41

## 2020-10-16 RX ADMIN — CEFPODOXIME PROXETIL 200 MG: 200 TABLET, FILM COATED ORAL at 17:00

## 2020-10-16 RX ADMIN — DILTIAZEM HYDROCHLORIDE 180 MG: 180 CAPSULE, COATED, EXTENDED RELEASE ORAL at 08:41

## 2020-10-16 NOTE — PROGRESS NOTES
Care Management Interventions  PCP Verified by CM: Yes  Mode of Transport at Discharge: BLS([facility])  Transition of Care Consult (CM Consult): SNF([facility])  Partner SNF: Yes  Discharge Durable Medical Equipment: No  Physical Therapy Consult: Yes  Occupational Therapy Consult: Yes  Speech Therapy Consult: No  Current Support Network: Own Home, Lives Alone, Has Personal Caregivers  Confirm Follow Up Transport: Family  The Plan for Transition of Care is Related to the Following Treatment Goals : Pt discharging to SNF for skilled therapy to increase mobility. The Patient and/or Patient Representative was Provided with a Choice of Provider and Agrees with the Discharge Plan?: Yes  Name of the Patient Representative Who was Provided with a Choice of Provider and Agrees with the Discharge Plan: Patient  Freedom of Choice List was Provided with Basic Dialogue that Supports the Patient's Individualized Plan of Care/Goals, Treatment Preferences and Shares the Quality Data Associated with the Providers?: Yes  [employer] Information Provided?: No  Discharge Location  Discharge Placement: Skilled nursing facility([facility])    UPDATE: CM received call from [facility]. They request that patient not arrive to facility until 6pm when they will have bed available. Transport time changed to 5:30. Pt will now go to room [address]. Queta Ends at [facility] to notify patient's son of change. RN notified. Pt medically ready for discharge to [facility], Room 2). Transport through [facility]; scheduled  at 2pm. Pt made aware of transport to [facility] today. CM notified RN of transport time and where to call report.

## 2020-10-16 NOTE — DISCHARGE SUMMARY
Hospitalist Discharge Summary     Admit Date:  10/14/2020 12:34 PM   DC note date: 10/16/2020  Name:  Nancy Becerra   Age:  80 y.o.  :  1930   MRN:  [id number]   PCP:  Inna Disla MD  Treatment Team: Attending Provider: Yue Chery MD; Care Manager: Omero Roland RN; Utilization Review: Yesenia Mcgee; : Brie Ayoub; Physical Therapist: Maryanne Forde PT; Primary Nurse: Humphrey King RN    Problem List for this Hospitalization:  Hospital Problems as of 10/16/2020 Never Reviewed          Codes Class Noted - Resolved POA    UTI (urinary tract infection) ICD-10-CM: N39.0  ICD-9-CM: 599.0  10/14/2020 - Present Yes        Paroxysmal atrial fibrillation ([facility]) (Chronic) ICD-10-CM: I48.0  ICD-9-CM: 427.31  2019 - Present Yes        Depression with anxiety (Chronic) ICD-10-CM: F41.8  ICD-9-CM: 300.4  2019 - Present Yes        HTN (hypertension) (Chronic) ICD-10-CM: I10  ICD-9-CM: 401.9  2019 - Present Yes        * (Principal) RESOLVED: MONICA (acute kidney injury) ([facility]) ICD-10-CM: N17.9  ICD-9-CM: 584.9  10/14/2020 - 10/15/2020 Yes        RESOLVED: Hypercalcemia ICD-10-CM: E72.48  ICD-9-CM: 275.42  10/14/2020 - 10/15/2020 Yes              Hospital Course:  Pt is a 81 y/o F with afib, HTN, who presented to ER with urinary changes, falls at home.  She lives independently for most part and ambulates with rolling walker.  Family assists often.  Family says for the last few days urine has been darker, malodorous, with some dysuria.   Pt has had decreased appetite and intake, and seems weaker. She was also having more confusion than usual.  She is compliant with meds.  Family has a had time getting her to drink water and stay hydrated even at baseline. Admitted with MONICA and UTI. Improved with rocephin and IVF. Urine cx growing GNRs and she is improved on rocephin so will convert her to vantin for PO therapy at SNF.   She is stable enough for discharge. Will give her 1 dose here before leaving; q24h dosing based on kidney function    BP elevated this morning but that was prior to medications and ACE/HCTZ was held also. Will give now as Cr back to baseline. Monitor BMP at SNF and encourage regular PO intake. She does not take exalon patch due to diarrhea, removed from list    Stopped norvasc as she is already on cardizem. Adjust other meds as needed for HTN    Disposition: Skilled Nursing Facility  Activity: PT/OT Eval and Treat  Diet: DIET PUREED 2 Nectar/2 Mildly Thick  Code Status: Full Code    Follow Up Orders:  No orders of the defined types were placed in this encounter. Follow-up Information     Follow up With Specialties Details Why [address] [facility] [place], [facility] Go today  [address]    Janusz Meza MD Family Medicine Go to when discharged from rehab [facility] [id number] [phone]            Discharge meds at bottom of this note. Plan was discussed with pt. All questions answered. Patient was stable at time of discharge. Given instructions to call a physician or return if any concerns. Discharge summary and encounter summary was sent to PCP electronically via \"Comm Mgt\" link in [facility], if possible. Diagnostic Imaging/Tests:   Ct Head Wo Cont    Result Date: 10/14/2020  CT Brain dated 10/14/2020  Comparison: 2/27/2020 Clinical Information:  Recent falls  5 mm axial images were obtained from skull base to vertex without contrast. Radiation dose reduction techniques were used for this study. Our scanners use one or all of the following:  Automated exposure control, adjustment of the mA and/or kV according to patient size, iterative reconstruction. Findings: Ventricles, sulci and cisterns are normal in size. No midline shift.  Ill-defined hypodensity in the cerebral white matter is consistent chronic ischemic white matter change. No hemorrhage, mass, mass effect or acute territorial infarction. No extra-axial abnormality. No skull fracture. Mastoid air cells and visualized paranasal sinuses are aerated and unremarkable. Impression: No Acute Abnormality     Xr Chest Port    Result Date: 10/14/2020  Portable AP upright chest dated 10/14/2020 at 1259 hours Prior chest x-ray 9/18/2019 CLINICAL INFORMATION: Decreased appetite, recent falls Heart is enlarged and thoracic aorta mildly tortuous. Pulmonary vascularity is normal and lungs are clear. No pleural effusion. There is elevation right diaphragm. This is unchanged. IMPRESSION: No acute abnormality      Echocardiogram results:  No results found for this visit on 10/14/20.     Procedures done this admission:  * No surgery found *    All Micro Results     Procedure Component Value Units Date/Time    CULTURE, URINE [[id number]]  (Abnormal) Collected:  10/14/20 1402    Order Status:  Completed Specimen:  Cath Urine Updated:  10/16/20 0952     Special Requests: NO SPECIAL REQUESTS        Culture result:       >100,000 COLONIES/mL GRAM NEGATIVE RODS IDENTIFICATION AND SUSCEPTIBILITY TO FOLLOW                SARS-CoV-2 Lab Results  \"Novel Coronavirus\" Test: No results found for: COV2NT   \"Emergent Disease\" Test: No results found for: EDPR  \"SARS-COV-2\" Test: No results found for: XGCOVT  Rapid Test:   Lab Results   Component Value Date/Time    COVR Not detected 10/14/2020 04:29 PM            Labs: Results:       BMP, Mg, Phos Recent Labs     10/16/20  0445 10/15/20  0431 10/14/20  1329    141 138   K 4.2 4.1 4.0   * 107 103   CO2 25 26 28   AGAP 7 8 7   BUN 23 23 25*   CREA 1.52* 1.57* 1.89*   CA 9.3 9.6 10.5*   GLU 83 82 91   MG  --   --  2.4      CBC Recent Labs     10/16/20  0445 10/15/20  0431 10/14/20  1441   WBC 7.6 6.6 7.9   RBC 3.55* 3.53* 3.79*   HGB 11.1* 10.7* 11.8   HCT 35.6* 33.9* 36.6   PLT 89* 137* 158 GRANS 68 71  --    LYMPH 23 19  --    EOS 2 1  --    MONOS 7 8  --    BASOS 1 1  --    IG 1 1  --    ANEU 5.1 4.7  --    ABL 1.7 1.2  --    NAVYA 0.1 0.1  --    ABM 0.5 0.5  --    ABB 0.0 0.0  --    AIG 0.0 0.0  --       LFT Recent Labs     10/14/20  1329   ALT 21      TP 7.6   ALB 3.6   GLOB 4.0*   AGRAT 0.9*      Cardiac Testing No results found for: BNPP, BNP, CPK, RCK1, RCK2, RCK3, RCK4, CKMB, CKNDX, CKND1, TROPT, TROIQ   Coagulation Tests No results found for: PTP, INR, APTT, INREXT, INREXT   A1c No results found for: HBA1C, HGBE8, DBO6UWNY, FAV9HQZH   Lipid Panel No results found for: CHOL, CHOLPOCT, CHOLX, CHLST, CHOLV, 308796, HDL, HDLP, LDL, LDLC, DLDLP, 817874, VLDLC, VLDL, TGLX, TRIGL, TRIGP, TGLPOCT, CHHD, CHHDX   Thyroid Panel No results found for: TSH, T4, FT4, TT3, T3U, TSHEXT, TSHEXT     Most Recent UA Lab Results   Component Value Date/Time    Color YELLOW 10/14/2020 02:02 PM    Appearance CLOUDY 10/14/2020 02:02 PM    Specific gravity 1.019 10/14/2020 02:02 PM    pH (UA) 7.0 10/14/2020 02:02 PM    Protein TRACE (A) 10/14/2020 02:02 PM    Glucose Negative 10/14/2020 02:02 PM    Ketone Negative 10/14/2020 02:02 PM    Bilirubin Negative 10/14/2020 02:02 PM    Blood Negative 10/14/2020 02:02 PM    Urobilinogen 0.2 10/14/2020 02:02 PM    Nitrites Positive (A) 10/14/2020 02:02 PM    Leukocyte Esterase MODERATE (A) 10/14/2020 02:02 PM    WBC  10/14/2020 02:02 PM    RBC 5-10 10/14/2020 02:02 PM    Epithelial cells 0 10/14/2020 02:02 PM    Bacteria 4+ (H) 10/14/2020 02:02 PM    Casts 0-3 10/14/2020 02:02 PM    Crystals, urine 0 05/10/2019 12:30 PM    Mucus 0 05/10/2019 12:30 PM        No Known Allergies  Immunization History   Administered Date(s) Administered    TB Skin Test (PPD) Intradermal 09/17/2019, 10/14/2020       All Labs from Last 24 Hrs:  Recent Results (from the past 24 hour(s))   PLEASE READ & DOCUMENT PPD TEST IN 24 HRS    Collection Time: 10/15/20  6:42 PM   Result Value Ref Range    PPD Negative Negative    mm Induration 0 0 - 5 mm   METABOLIC PANEL, BASIC    Collection Time: 10/16/20  4:45 AM   Result Value Ref Range    Sodium 141 136 - 145 mmol/L    Potassium 4.2 3.5 - 5.1 mmol/L    Chloride 109 (H) 98 - 107 mmol/L    CO2 25 21 - 32 mmol/L    Anion gap 7 7 - 16 mmol/L    Glucose 83 65 - 100 mg/dL    BUN 23 8 - 23 MG/DL    Creatinine 1.52 (H) 0.6 - 1.0 MG/DL    GFR est AA 41 (L) >60 ml/min/1.73m2    GFR est non-AA 34 (L) >60 ml/min/1.73m2    Calcium 9.3 8.3 - 10.4 MG/DL   CBC WITH AUTOMATED DIFF    Collection Time: 10/16/20  4:45 AM   Result Value Ref Range    WBC 7.6 4.3 - 11.1 K/uL    RBC 3.55 (L) 4.05 - 5.2 M/uL    HGB 11.1 (L) 11.7 - 15.4 g/dL    HCT 35.6 (L) 35.8 - 46.3 %    .3 (H) 79.6 - 97.8 FL    MCH 31.3 26.1 - 32.9 PG    MCHC 31.2 (L) 31.4 - 35.0 g/dL    RDW 14.1 11.9 - 14.6 %    PLATELET 89 (L) 512 - 450 K/uL    MPV 11.8 9.4 - 12.3 FL    ABSOLUTE NRBC 0.00 0.0 - 0.2 K/uL    DF AUTOMATED      NEUTROPHILS 68 43 - 78 %    LYMPHOCYTES 23 13 - 44 %    MONOCYTES 7 4.0 - 12.0 %    EOSINOPHILS 2 0.5 - 7.8 %    BASOPHILS 1 0.0 - 2.0 %    IMMATURE GRANULOCYTES 1 0.0 - 5.0 %    ABS. NEUTROPHILS 5.1 1.7 - 8.2 K/UL    ABS. LYMPHOCYTES 1.7 0.5 - 4.6 K/UL    ABS. MONOCYTES 0.5 0.1 - 1.3 K/UL    ABS. EOSINOPHILS 0.1 0.0 - 0.8 K/UL    ABS. BASOPHILS 0.0 0.0 - 0.2 K/UL    ABS. IMM.  GRANS. 0.0 0.0 - 0.5 K/UL       Discharge Exam:  Patient Vitals for the past 24 hrs:   Temp Pulse Resp BP SpO2   10/16/20 1053 98.2 °F (36.8 °C) 93 18 (!) 142/89 90 %   10/16/20 0714 98.1 °F (36.7 °C) 86 19 (!) 188/92 94 %   10/16/20 0345 98.2 °F (36.8 °C) 74 17 112/72 100 %   10/15/20 2340 98.3 °F (36.8 °C) 76 18 110/75 99 %   10/15/20 2002 98.5 °F (36.9 °C) 76 18 105/75 93 %     Oxygen Therapy  O2 Sat (%): 90 % (10/16/20 1053)  Pulse via Oximetry: 68 beats per minute (10/14/20 1708)  O2 Device: Room air (10/15/20 2002)    Estimated body mass index is 20.97 kg/m² as calculated from the following:    Height as of this encounter: 5' 1\" (1.549 m). Weight as of this encounter: 50.3 kg (111 lb). Intake/Output Summary (Last 24 hours) at 10/16/2020 1552  Last data filed at 10/16/2020 0836  Gross per 24 hour   Intake 300 ml   Output 200 ml   Net 100 ml       *Note that automatically entered I/Os may not be accurate; dependent on patient compliance with collection and accurate  by assistants. General:    Well nourished. Alert. Eyes:   Normal sclerae. Extraocular movements intact. ENT:  Normocephalic, atraumatic. Moist mucous membranes  CV:   Regular rate and rhythm. No murmur, rub, or gallop. Lungs:  Clear to auscultation bilaterally. No wheezing, rhonchi, or rales. Abdomen: Soft, nontender, nondistended. Extremities: Warm and dry. No cyanosis or edema. Neurologic: CN II-XII grossly intact. No gross focal deficits   Skin:     No rashes or jaundice. Psych:  Normal mood and affect.     Current Med List in Hospital:   Current Facility-Administered Medications   Medication Dose Route Frequency    lisinopriL (PRINIVIL, ZESTRIL) tablet 10 mg  10 mg Oral DAILY    hydroCHLOROthiazide (HYDRODIURIL) tablet 12.5 mg  12.5 mg Oral DAILY    cefpodoxime (VANTIN) tablet 200 mg  200 mg Oral ONCE    metoprolol succinate (TOPROL-XL) XL tablet 50 mg  50 mg Oral QPM    dilTIAZem ER (CARDIZEM CD) capsule 180 mg  180 mg Oral DAILY    mirtazapine (REMERON) tablet 15 mg  15 mg Oral QHS    QUEtiapine (SEROquel) tablet 50 mg  50 mg Oral QPM    venlafaxine-SR (EFFEXOR-XR) capsule 150 mg  150 mg Oral DAILY    busPIRone (BUSPAR) tablet 5 mg  5 mg Oral BID    cefTRIAXone (ROCEPHIN) 1 g in 0.9% sodium chloride (MBP/ADV) 50 mL  1 g IntraVENous Q24H    sodium chloride (NS) flush 5-40 mL  5-40 mL IntraVENous Q8H    sodium chloride (NS) flush 5-40 mL  5-40 mL IntraVENous PRN    potassium chloride 10 mEq in 100 ml IVPB  10 mEq IntraVENous PRN    magnesium sulfate 2 g/50 ml IVPB (premix or compounded)  2 g IntraVENous PRN    acetaminophen (TYLENOL) tablet 650 mg  650 mg Oral Q6H PRN    Or    acetaminophen (TYLENOL) suppository 650 mg  650 mg Rectal Q6H PRN    polyethylene glycol (MIRALAX) packet 17 g  17 g Oral DAILY PRN    bisacodyL (DULCOLAX) suppository 10 mg  10 mg Rectal DAILY PRN    ondansetron (ZOFRAN ODT) tablet 4 mg  4 mg Oral Q8H PRN    Or    ondansetron (ZOFRAN) injection 4 mg  4 mg IntraVENous Q6H PRN    famotidine (PEPCID) tablet 20 mg  20 mg Oral BID PRN       Discharge Info:   Current Discharge Medication List      START taking these medications    Details   cefpodoxime (VANTIN) 200 mg tablet Take 1 Tab by mouth Daily (before dinner) for 5 days. Qty: 5 Tab, Refills: 0         CONTINUE these medications which have NOT CHANGED    Details   benazepril-hydroCHLOROthiazide (Lotensin HCT) 10-12.5 mg per tablet Take 1 Tab by mouth daily. cholecalciferol, vitamin D3, (Vitamin D3) 50 mcg (2,000 unit) tab Take 1 Tab by mouth daily. cyanocobalamin 1,000 mcg tablet Take 1,000 mcg by mouth daily. folic acid (FOLVITE) 1 mg tablet Take 1 mg by mouth daily. pe-shark liver oil-cocoa buttr (Hemorrhoidal) 0.25-3 % suppository Insert 1 Suppository into rectum as needed for Hemorrhoids. melatonin 5 mg tablet Take  by mouth nightly. polyethylene glycol (Miralax) 17 gram/dose powder Take 17 g by mouth daily as needed for Constipation. acetaminophen (TylenoL) 325 mg tablet Take 650 mg by mouth every four (4) hours as needed for Pain. busPIRone (BUSPAR) 5 mg tablet Take 1 Tab by mouth three (3) times daily. Indications: Repeated Episodes of Anxiety  Qty: 90 Tab, Refills: 0      dilTIAZem CD (CARDIZEM CD) 120 mg ER capsule Take 1 Cap by mouth daily. Indications: high blood pressure, Ventricular Rate Control in Atrial Fibrillation  Qty: 30 Cap, Refills: 0      methyl salicylate-menthol (BENGAY) 15-10 % topical cream Apply  to affected area four (4) times daily.  Apply to painful area on R foot and any other muculoskeletal areas needed  Qty: 1 Tube, Refills: 0      metoprolol succinate (TOPROL-XL) 100 mg tablet Take 1 Tab by mouth every evening. Indications: Ventricular Rate Control in Atrial Fibrillation  Qty: 30 Tab, Refills: 0      QUEtiapine (SEROQUEL) 25 mg tablet Take 1 Tab by mouth every evening. 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 Venlafaxine 150 mg tr24 Take 150 mg by mouth.      mirtazapine (REMERON) 15 mg tablet Take 15 mg by mouth nightly. STOP taking these medications       rivastigmine (EXELON) 4.6 mg/24 hr patch Comments:   Reason for Stopping:         amLODIPine (NORVASC) 5 mg tablet Comments:   Reason for Stopping:                 Time spent in patient discharge planning and coordination 35 minutes.     Signed:  Maddy Doss MD

## 2020-10-16 NOTE — PROGRESS NOTES
Problem: Falls - Risk of  Goal: *Absence of Falls  Description: Document Mika Jacob Fall Risk and appropriate interventions in the flowsheet.   Outcome: Progressing Towards Goal  Note: Fall Risk Interventions:  Mobility Interventions: Bed/chair exit alarm, Communicate number of staff needed for ambulation/transfer, Patient to call before getting OOB    Mentation Interventions: Bed/chair exit alarm, Adequate sleep, hydration, pain control, Door open when patient unattended, Evaluate medications/consider consulting pharmacy, More frequent rounding, Reorient patient, Room close to nurse's station    Medication Interventions: Bed/chair exit alarm, Evaluate medications/consider consulting pharmacy, Patient to call before getting OOB, Teach patient to arise slowly    Elimination Interventions: Bed/chair exit alarm, Call light in reach, Patient to call for help with toileting needs, Toilet paper/wipes in reach    History of Falls Interventions: Bed/chair exit alarm, Consult care management for discharge planning, Door open when patient unattended, Evaluate medications/consider consulting pharmacy, Investigate reason for fall, Room close to nurse's station

## 2020-10-16 NOTE — PROGRESS NOTES
Problem: Falls - Risk of  Goal: *Absence of Falls  Description: Document Danita Harris Fall Risk and appropriate interventions in the flowsheet.   Outcome: Progressing Towards Goal  Note: Fall Risk Interventions:  Mobility Interventions: Bed/chair exit alarm, Communicate number of staff needed for ambulation/transfer, Patient to call before getting OOB    Mentation Interventions: Bed/chair exit alarm, Adequate sleep, hydration, pain control, Door open when patient unattended, Evaluate medications/consider consulting pharmacy, More frequent rounding, Reorient patient, Room close to nurse's station    Medication Interventions: Bed/chair exit alarm, Evaluate medications/consider consulting pharmacy, Patient to call before getting OOB, Teach patient to arise slowly    Elimination Interventions: Bed/chair exit alarm, Call light in reach, Patient to call for help with toileting needs, Toilet paper/wipes in reach    History of Falls Interventions: Bed/chair exit alarm, Consult care management for discharge planning, Door open when patient unattended, Evaluate medications/consider consulting pharmacy, Investigate reason for fall, Room close to nurse's station

## 2020-10-16 NOTE — PROGRESS NOTES
Problem: Mobility Impaired (Adult and Pediatric)  Goal: *Acute Goals and Plan of Care (Insert Text)  Outcome: Progressing Towards Goal  Note: STG:  (1.)Ms. Dominique Hernandez will move from supine to sit and sit to supine with STAND BY ASSIST within 1-3 treatment day(s). (2.)Ms. Dominique Hernandez will transfer from bed to chair and chair to bed with MINIMAL ASSIST using the least restrictive device within 1-3 treatment day(s). Met 10/16  (3.)Ms. Montgomery will ambulate with MINIMAL ASSIST for 20 feet with the least restrictive device within 1-3 treatment day(s). Met 10/16    LTG:  (1.)Ms. Dominique Hernandez will move from supine to sit and sit to supine in bed with MODIFIED INDEPENDENCE within 4-6 treatment day(s). (2.)Ms. Dominique Hernandez will transfer from bed to chair and chair to bed with CONTACT GUARD ASSIST using the least restrictive device within 4-6 treatment day(s). (3.)Ms. Montgomery will ambulate with MINIMAL ASSIST for 50 feet with the least restrictive device within 4-6 treatment day(s). Met 10/16    ADDENDUM GOAL 10/16/20 : 1) pt ambulating 75 ft with rolling walker & CGA.  ________________________________________________________________________________________________      PHYSICAL THERAPY: Daily Note and AM 10/16/2020  INPATIENT: PT Visit Days : 2  [place] / [place] / Product Type: Medicare /       NAME/AGE/GENDER: Kiki Gustafson is a 80 y.o. female   PRIMARY DIAGNOSIS: MONICA (acute kidney injury) ([facility] [N17.9] MONICA (acute kidney injury) ([facility] [address] MONICA (acute kidney injury) ([facility] [address]       ICD-10: Treatment Diagnosis:    · Generalized Muscle Weakness (M62.81)  · Difficulty in walking, Not elsewhere classified (R26.2)  · Other abnormalities of gait and mobility (R26.89)  · Repeated Falls (R29.6)   Precaution/Allergies:  Patient has no known allergies. ASSESSMENT:     Ms. Dominique Hernandez showed increased gait distance but unsteady with transfers & gait.  This pt will need 24/7 assist at home or SNF rehab. There is a high probability that this pt will also need some level of supervision even after she reaches her goals in rehab. This section established at most recent assessment   PROBLEM LIST (Impairments causing functional limitations):  1. Decreased Strength  2. Decreased ADL/Functional Activities  3. Decreased Transfer Abilities  4. Decreased Ambulation Ability/Technique  5. Decreased Balance  6. Decreased Activity Tolerance  7. Decreased Cognition   INTERVENTIONS PLANNED: (Benefits and precautions of physical therapy have been discussed with the patient.)  1. Bed Mobility  2. Gait Training  3. Therapeutic Activites  4. Therapeutic Exercise/Strengthening  5. Transfer Training     TREATMENT PLAN: Frequency/Duration: daily for duration of hospital stay  Rehabilitation Potential For Stated Goals: Good     REHAB RECOMMENDATIONS (at time of discharge pending progress):    Placement: It is my opinion, based on this patient's performance to date, that Ms. Montgomery may benefit from intensive therapy at a [address] [place] [address] after discharge due to the functional deficits listed above that are likely to improve with skilled rehabilitation and concerns that he/she may be unsafe to be unsupervised at home due to recent history of frequent falls . Equipment:    None at this time              HISTORY:   History of Present Injury/Illness (Reason for Referral):  PER H&P: Pt is a 81 y/o F with afib, HTN, who presented to ER with urinary changes, falls at home. She lives independently for most part and ambulates with rolling walker. Family assists often. Family says for the last few days urine has been darker, malodorous, with some dysuria. Pt has had decreased appetite and intake, and seems weaker. Has fallen several times in few days which is more than usual.  She is also having more confusion than usual.  She is compliant with meds.   Family has a had time getting her to drink water and stay hydrated even at baseline. She reported chills yesterday also. No CP, SOB, cough, diarrhea, n/v, fevers. Past Medical History/Comorbidities:   Ms. Isabel Mcmahon  has a past medical history of ADHD, Arthritis, Dementia ([address] and Hypertension. Ms. Isabel Mcmahon  has no past surgical history on file. Social History/Living Environment:   Home Environment: Private residence  One/Two Story Residence: Two story, live on 1st floor  Living Alone: Yes  Support Systems: Child(nura)(sitters)  Patient Expects to be Discharged to[de-identified] Skilled nursing facility  Current DME Used/Available at Home: Blood pressure cuff, Commode, bedside, Shower chair, Walker, rollator  Prior Level of Function/Work/Activity:  Ambulating short distances with with rollator and assistance     Number of Personal Factors/Comorbidities that affect the Plan of Care: 1-2: MODERATE COMPLEXITY   EXAMINATION:   Most Recent Physical Functioning:   Gross Assessment: 3-/5 throughout                       Balance:  Sitting: Intact; Without support  Standing: Impaired; With support(walker) Bed Mobility:  Supine to Sit: Minimum assistance  Sit to Supine: (NT)  Scooting: Contact guard assistance       Transfers:  Sit to Stand: Minimum assistance  Stand to Sit: Minimum assistance  Bed to Chair: Minimum assistance(with walker)  Duration: 23 Minutes(extra time to work through activity noted)  Gait:     Speed/Romina: Delayed  Gait Abnormalities: Decreased step clearance(mild sway)  Distance (ft): 60 Feet (ft)  Assistive Device: Walker, rolling  Ambulation - Level of Assistance: Minimal assistance  Interventions: Manual cues; Safety awareness training;Verbal cues; Visual/Demos      Body Structures Involved:  1. Muscles Body Functions Affected:  1. Genitourinary  2. Neuromusculoskeletal  3. Movement Related Activities and Participation Affected:  1. General Tasks and Demands  2.  Mobility   Number of elements that affect the Plan of Care: 3: MODERATE COMPLEXITY   CLINICAL PRESENTATION: Presentation: Evolving clinical presentation with changing clinical characteristics: MODERATE COMPLEXITY   CLINICAL DECISION MAKIN [facility] Mobility Inpatient Short Form  How much difficulty does the patient currently have. .. Unable A Lot A Little None   1. Turning over in bed (including adjusting bedclothes, sheets and blankets)? [] 1   [] 2   [x] 3   [] 4   2. Sitting down on and standing up from a chair with arms ( e.g., wheelchair, bedside commode, etc.)   [] 1   [] 2   [x] 3   [] 4   3. Moving from lying on back to sitting on the side of the bed? [] 1   [] 2   [x] 3   [] 4   How much help from another person does the patient currently need. .. Total A Lot A Little None   4. Moving to and from a bed to a chair (including a wheelchair)? [] 1   [] 2   [x] 3   [] 4   5. Need to walk in hospital room? [] 1   [x] 2   [] 3   [] 4   6. Climbing 3-5 steps with a railing? [] 1   [x] 2   [] 3   [] 4   © , Trustees of [address] [place] [address], under license to [employer]. All rights reserved      Score:  Initial: 16 Most Recent: X (Date: -- )    Interpretation of Tool:  Represents activities that are increasingly more difficult (i.e. Bed mobility, Transfers, Gait). Medical Necessity:     · Patient demonstrates   · Fair to good  ·  rehab potential due to higher previous functional level. Reason for Services/Other Comments:  · Patient   · continues to require present interventions due to patient's inability to perform exercises and functional mobility safely and independently  · .    Use of outcome tool(s) and clinical judgement create a POC that gives a: Clear prediction of patient's progress: LOW COMPLEXITY            TREATMENT:   (In addition to Assessment/Re-Assessment sessions the following treatments were rendered)   Pre-treatment Symptoms/Complaints:  Sore backside  Pain: Initial: numeric scale  Pain Intensity 1: 3  Pain Location 1: Buttocks  Pain Orientation 1: Mid  Pain Intervention(s) 1: Repositioned  Post Session:  0/10     Therapeutic Activity: (  23 Minutes(extra time to work through activity noted) ):  Therapeutic activities including bed mobility, transfers & progressive gait with rolling walker along with cool down exercises AROM to UE & LE's in recliner to improve mobility, strength, balance, coordination and dynamic movement of arm - bilateral and leg - bilateral to improve functional stability & endurance. Braces/Orthotics/Lines/Etc:   · IV  · O2 Device: Room air  Treatment/Session Assessment:    · Response to Treatment:  Pt cooperative but very weak  · Interdisciplinary Collaboration:   o Registered Nurse  · After treatment position/precautions:   o Up in chair  o Bed alarm/tab alert on  o Bed/Chair-wheels locked  o Call light within reach  o RN notified   · Compliance with Program/Exercises: Will assess as treatment progresses  · Recommendations/Intent for next treatment session: \"Next visit will focus on reduction in assistance provided\".   Total Treatment Duration:  PT Patient Time In/Time Out  Time In: 1035  Time Out: [facility],

## 2020-10-16 NOTE — PROGRESS NOTES
TRANSFER - OUT REPORT:    Verbal report given to CLEM Mcelroy on Srinivasa Pretty  being transferred to [facility] for routine progression of care       Report consisted of patients Situation, Background, Assessment and   Recommendations(SBAR). Information from the following report(s) SBAR was reviewed with the receiving nurse. Lines:       Opportunity for questions and clarification was provided.       Patient transported with:  [employer]

## 2020-10-17 LAB
BACTERIA SPEC CULT: ABNORMAL
SERVICE CMNT-IMP: ABNORMAL

## 2020-10-19 ENCOUNTER — PATIENT OUTREACH (OUTPATIENT)
Dept: CASE MANAGEMENT | Age: 85
End: 2020-10-19

## 2020-10-19 NOTE — PROGRESS NOTES
Patient discharged to the [address] [place] [address] on 10/16/20. Patient discharged to a [facility] facility. Patient will be included in weekly care coordination calls. Information forwarded to Tate Franklin RN, [facility] RN Care Manager.

## 2020-11-06 ENCOUNTER — PATIENT OUTREACH (OUTPATIENT)
Dept: CASE MANAGEMENT | Age: 85
End: 2020-11-06

## 2020-11-06 NOTE — PROGRESS NOTES
Community Care Team documentation for patient in [facility]    The information below provided by:Kimmy Vila    PT Update: FM  ft CGA. Transfers CGA. LB dressing mod A. Bed mob Max A. Toileting CGA. ST-Trial regular diet thin liquids no S/S of aspiration.          Nursing Update:upgrade diet [facility] soft, no change      Discharge Date:TBD      Assign to [facility] Manager:NAHUN

## 2023-08-03 NOTE — PROGRESS NOTES
I called and left a  this morning to give me a call with the fax number so I could send it. Physician Progress Note      PATIENT:               Randall Arias  CSN #:                  [id number]  :                       1930  ADMIT DATE:       10/14/2020 12:34 PM  [address] [place] DATE:        10/16/2020 5:35 PM  RESPONDING  PROVIDER #:        Cynthia Arceo MD          QUERY TEXT:    Patient admitted with MONICA. Please document in progress notes and discharge summary:    The medical record reflects the following:  Risk Factors: Elderly patient with decreased appetite and intake  Clinical Indicators: 10-14 creatinine 1.89  10-16 creatinine 1.52  Treatment: IVF, hold Lotensin    Defined by Kidney Disease Improving Global Outcomes (KDIGO) clinical practice guideline for acute kidney injury:  -Increase in SCr by ? 0.3 mg/dl within 48 hours; or  -Increase in SCr to ? 1.5 times baseline, which is known or presumed to have occurred within the prior 7 days; or  -Urine volume < 0.5ml/kg/h for 6 hours  Options provided:  -- Acute kidney injury evidenced by, Please document evidence. -- Acute kidney injury ruled out after study  -- Other - I will add my own diagnosis  -- Disagree - Not applicable / Not valid  -- Disagree - Clinically unable to determine / Unknown  -- Refer to Clinical Documentation Reviewer    PROVIDER RESPONSE TEXT:    This patient has an acute kidney injury as evidenced by Cr increase of 0.3 above baseline.  1.89 to 1.52 is more than 0.3    Query created by: Duke Torres on 10/22/2020 8:28 AM      Electronically signed by:  Cynthia Arceo MD 10/27/2020 7:17 AM